# Patient Record
Sex: FEMALE | Race: WHITE | NOT HISPANIC OR LATINO | Employment: FULL TIME | URBAN - METROPOLITAN AREA
[De-identification: names, ages, dates, MRNs, and addresses within clinical notes are randomized per-mention and may not be internally consistent; named-entity substitution may affect disease eponyms.]

---

## 2021-01-22 ENCOUNTER — PATIENT MESSAGE (OUTPATIENT)
Dept: ADMINISTRATIVE | Facility: OTHER | Age: 61
End: 2021-01-22

## 2022-12-20 ENCOUNTER — OFFICE VISIT (OUTPATIENT)
Dept: PULMONOLOGY | Facility: CLINIC | Age: 62
End: 2022-12-20
Payer: COMMERCIAL

## 2022-12-20 VITALS
DIASTOLIC BLOOD PRESSURE: 75 MMHG | HEIGHT: 70 IN | OXYGEN SATURATION: 98 % | SYSTOLIC BLOOD PRESSURE: 120 MMHG | RESPIRATION RATE: 20 BRPM | BODY MASS INDEX: 24.02 KG/M2 | WEIGHT: 167.75 LBS | HEART RATE: 92 BPM

## 2022-12-20 DIAGNOSIS — D38.1 NEOPLASM OF UNCERTAIN BEHAVIOR OF LEFT UPPER LOBE OF LUNG: Primary | ICD-10-CM

## 2022-12-20 PROCEDURE — 3078F PR MOST RECENT DIASTOLIC BLOOD PRESSURE < 80 MM HG: ICD-10-PCS | Mod: CPTII,S$GLB,, | Performed by: INTERNAL MEDICINE

## 2022-12-20 PROCEDURE — 1159F PR MEDICATION LIST DOCUMENTED IN MEDICAL RECORD: ICD-10-PCS | Mod: CPTII,S$GLB,, | Performed by: INTERNAL MEDICINE

## 2022-12-20 PROCEDURE — 3008F BODY MASS INDEX DOCD: CPT | Mod: CPTII,S$GLB,, | Performed by: INTERNAL MEDICINE

## 2022-12-20 PROCEDURE — 1160F RVW MEDS BY RX/DR IN RCRD: CPT | Mod: CPTII,S$GLB,, | Performed by: INTERNAL MEDICINE

## 2022-12-20 PROCEDURE — 1159F MED LIST DOCD IN RCRD: CPT | Mod: CPTII,S$GLB,, | Performed by: INTERNAL MEDICINE

## 2022-12-20 PROCEDURE — 99999 PR PBB SHADOW E&M-NEW PATIENT-LVL III: CPT | Mod: PBBFAC,,, | Performed by: INTERNAL MEDICINE

## 2022-12-20 PROCEDURE — 99204 OFFICE O/P NEW MOD 45 MIN: CPT | Mod: S$GLB,,, | Performed by: INTERNAL MEDICINE

## 2022-12-20 PROCEDURE — 3074F SYST BP LT 130 MM HG: CPT | Mod: CPTII,S$GLB,, | Performed by: INTERNAL MEDICINE

## 2022-12-20 PROCEDURE — 1160F PR REVIEW ALL MEDS BY PRESCRIBER/CLIN PHARMACIST DOCUMENTED: ICD-10-PCS | Mod: CPTII,S$GLB,, | Performed by: INTERNAL MEDICINE

## 2022-12-20 PROCEDURE — 99999 PR PBB SHADOW E&M-NEW PATIENT-LVL III: ICD-10-PCS | Mod: PBBFAC,,, | Performed by: INTERNAL MEDICINE

## 2022-12-20 PROCEDURE — 3078F DIAST BP <80 MM HG: CPT | Mod: CPTII,S$GLB,, | Performed by: INTERNAL MEDICINE

## 2022-12-20 PROCEDURE — 3008F PR BODY MASS INDEX (BMI) DOCUMENTED: ICD-10-PCS | Mod: CPTII,S$GLB,, | Performed by: INTERNAL MEDICINE

## 2022-12-20 PROCEDURE — 3074F PR MOST RECENT SYSTOLIC BLOOD PRESSURE < 130 MM HG: ICD-10-PCS | Mod: CPTII,S$GLB,, | Performed by: INTERNAL MEDICINE

## 2022-12-20 PROCEDURE — 99204 PR OFFICE/OUTPT VISIT, NEW, LEVL IV, 45-59 MIN: ICD-10-PCS | Mod: S$GLB,,, | Performed by: INTERNAL MEDICINE

## 2022-12-20 RX ORDER — FLUTICASONE PROPIONATE AND SALMETEROL 100; 50 UG/1; UG/1
1 POWDER RESPIRATORY (INHALATION) 2 TIMES DAILY
COMMUNITY
Start: 2022-12-06 | End: 2022-12-20 | Stop reason: SDUPTHER

## 2022-12-20 RX ORDER — AZELASTINE 1 MG/ML
1 SPRAY, METERED NASAL 2 TIMES DAILY
COMMUNITY
Start: 2022-09-14

## 2022-12-20 RX ORDER — LEVOTHYROXINE SODIUM 88 UG/1
88 TABLET ORAL NIGHTLY
COMMUNITY
Start: 2022-11-08

## 2022-12-20 RX ORDER — FLUCONAZOLE 150 MG/1
150 TABLET ORAL ONCE
COMMUNITY
Start: 2022-12-14 | End: 2023-09-08

## 2022-12-20 RX ORDER — CETIRIZINE HYDROCHLORIDE 10 MG/1
5 TABLET ORAL DAILY
COMMUNITY

## 2022-12-20 RX ORDER — FLUTICASONE PROPIONATE 50 MCG
1 SPRAY, SUSPENSION (ML) NASAL 2 TIMES DAILY
COMMUNITY
Start: 2022-12-06

## 2022-12-20 RX ORDER — METHYLPHENIDATE HYDROCHLORIDE 18 MG/1
18 TABLET ORAL EVERY MORNING
COMMUNITY
Start: 2022-12-14 | End: 2024-02-28

## 2022-12-20 RX ORDER — FLUOXETINE HYDROCHLORIDE 20 MG/1
20 CAPSULE ORAL DAILY
COMMUNITY
Start: 2022-12-14

## 2022-12-20 RX ORDER — ESZOPICLONE 3 MG/1
3 TABLET, FILM COATED ORAL NIGHTLY
COMMUNITY
Start: 2022-12-05

## 2022-12-20 RX ORDER — FLUTICASONE PROPIONATE AND SALMETEROL 100; 50 UG/1; UG/1
1 POWDER RESPIRATORY (INHALATION)
COMMUNITY
Start: 2022-12-06 | End: 2023-09-08

## 2022-12-21 ENCOUNTER — PATIENT MESSAGE (OUTPATIENT)
Dept: PULMONOLOGY | Facility: CLINIC | Age: 62
End: 2022-12-21
Payer: COMMERCIAL

## 2022-12-21 NOTE — PROGRESS NOTES
Subjective:       Patient ID: Erma Hancock is a 62 y.o. female.    Chief Complaint: Abnormal Ct Scan    Abnormal Ct Scan    Patient is a 62-year-old female who recently saw her primary care physician with complaints of some left-sided pleuritic chest pain and cough.  This was assessed with a chest radiograph which subsequently led to a chest CT which showed a suspicious left upper lobe mass as well as multiple subcentimeter left-sided pulmonary nodules.  She was referred to me for that reason.  She is here today with her friend.  She states that she continues to have intermittent cough and left-sided pleuritic chest pain.  She was given albuterol inhaler which does help slightly.  No fevers, wheezing, hemoptysis or productive sputum.  No unintentional weight loss.  She does have a history of smoking but quit about 20-25 years ago.  Worked previously as an ER and hospice nurse.  No industrial or environmental exposure history.    Past Medical History:   Diagnosis Date    Thyroid disease      Past Surgical History:   Procedure Laterality Date    CHOLECYSTECTOMY      HYSTERECTOMY       Social History     Tobacco Use    Smoking status: Former     Types: Cigarettes     Quit date:      Years since quittin.9    Smokeless tobacco: Never   Substance Use Topics    Alcohol use: Yes     Alcohol/week: 2.0 standard drinks     Types: 2 Glasses of wine per week    Drug use: Not Currently     Family History   Problem Relation Age of Onset    Heart failure Mother     Diabetes Mother     Rheum arthritis Father     Diabetes Brother      Current Outpatient Medications   Medication Instructions    azelastine (ASTELIN) 137 mcg (0.1 %) nasal spray SMARTSIG:Both Nares    cetirizine (ZYRTEC) 5 mg, Oral    eszopiclone (LUNESTA) 3 mg, Oral, Nightly PRN    fluconazole (DIFLUCAN) 150 mg, Oral, Once    FLUoxetine 20 mg, Oral    fluticasone propionate (FLONASE) 50 mcg/actuation nasal spray 1 spray, Each Nostril, 2 times daily     fluticasone-salmeterol diskus inhaler 100-50 mcg 1 puff, Inhalation    levothyroxine (SYNTHROID) 88 mcg, Oral    methylphenidate HCl 18 mg, Oral, Every morning    TYRVAYA 0.03 mg/spray sprm 1 spray, Each Nostril, 2 times daily       Review of Systems  as per HPI otherwise negative    Objective:      Physical Exam   Constitutional: She is oriented to person, place, and time. She appears well-developed and well-nourished. No distress.   HENT:   Mouth/Throat: Oropharynx is clear and moist.   Neck: No JVD present.   Cardiovascular: Normal rate and regular rhythm.   No murmur heard.  Pulmonary/Chest: Normal expansion, effort normal and breath sounds normal. No respiratory distress.   Abdominal: Soft. She exhibits no distension. There is no abdominal tenderness.   Musculoskeletal:         General: No edema.      Cervical back: Neck supple.   Neurological: She is alert and oriented to person, place, and time.   Psychiatric: She has a normal mood and affect.   Nursing note and vitals reviewed.        Assessment:       1. 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     Answer:   Yes     I personally reviewed CT chest images.  My interpretation is as per history of present illness      Plan:       Dominant left upper lobe mass at about 2.6 x 2.4 cm.  It is located adjacent to the fissure.  Additionally there numerous other subcentimeter left-sided pulmonary nodules in the upper and lower lobes.  No distinct adenopathy noted per my interpretation.  No pleural process or pleural effusions noted.  Obviously worrisome for malignant process.  Alternatively carcinoid of the lung can certainly have this appearance.  If this is  malignant, a metastatic process would be favored.  My current plan is to obtain PET-CT at the earliest available and we will plan for navigational bronchoscopy for biopsy of the dominant mass in the left upper lobe.  I discussed the risks benefits and alternatives of the procedure with the patient she would an voiced understanding.  I will make plans based on the results of her PET and bronchoscopic biopsy.

## 2022-12-22 DIAGNOSIS — D38.1 NEOPLASM OF UNCERTAIN BEHAVIOR OF LUNG: Primary | ICD-10-CM

## 2022-12-23 ENCOUNTER — TELEPHONE (OUTPATIENT)
Dept: PULMONOLOGY | Facility: CLINIC | Age: 62
End: 2022-12-23
Payer: COMMERCIAL

## 2022-12-23 ENCOUNTER — PATIENT MESSAGE (OUTPATIENT)
Dept: ENDOSCOPY | Facility: HOSPITAL | Age: 62
End: 2022-12-23
Payer: COMMERCIAL

## 2022-12-26 ENCOUNTER — PATIENT MESSAGE (OUTPATIENT)
Dept: PULMONOLOGY | Facility: CLINIC | Age: 62
End: 2022-12-26
Payer: COMMERCIAL

## 2022-12-29 ENCOUNTER — PATIENT MESSAGE (OUTPATIENT)
Dept: PULMONOLOGY | Facility: CLINIC | Age: 62
End: 2022-12-29
Payer: COMMERCIAL

## 2022-12-30 DIAGNOSIS — D38.1 NEOPLASM OF UNCERTAIN BEHAVIOR OF LEFT LOWER LOBE OF LUNG: Primary | ICD-10-CM

## 2023-01-03 ENCOUNTER — HOSPITAL ENCOUNTER (OUTPATIENT)
Dept: RADIOLOGY | Facility: HOSPITAL | Age: 63
Discharge: HOME | End: 2023-01-03
Attending: INTERNAL MEDICINE
Payer: COMMERCIAL

## 2023-01-03 DIAGNOSIS — D38.1 NEOPLASM OF UNCERTAIN BEHAVIOR OF LEFT UPPER LOBE OF LUNG: ICD-10-CM

## 2023-01-03 PROCEDURE — A9552 F18 FDG: HCPCS

## 2023-01-03 PROCEDURE — 78815 NM PET CT ROUTINE: ICD-10-PCS | Mod: 26,PS,, | Performed by: RADIOLOGY

## 2023-01-03 PROCEDURE — 78815 PET IMAGE W/CT SKULL-THIGH: CPT | Mod: 26,PS,, | Performed by: RADIOLOGY

## 2023-01-05 ENCOUNTER — HOSPITAL ENCOUNTER (OUTPATIENT)
Facility: HOSPITAL | Age: 63
Discharge: HOME | End: 2023-01-05
Attending: INTERNAL MEDICINE | Admitting: INTERNAL MEDICINE
Payer: COMMERCIAL

## 2023-01-05 ENCOUNTER — ANESTHESIA (OUTPATIENT)
Dept: ENDOSCOPY | Facility: HOSPITAL | Age: 63
End: 2023-01-05
Payer: COMMERCIAL

## 2023-01-05 ENCOUNTER — ANESTHESIA EVENT (OUTPATIENT)
Dept: ENDOSCOPY | Facility: HOSPITAL | Age: 63
End: 2023-01-05
Payer: COMMERCIAL

## 2023-01-05 VITALS
HEIGHT: 70 IN | OXYGEN SATURATION: 94 % | SYSTOLIC BLOOD PRESSURE: 139 MMHG | RESPIRATION RATE: 15 BRPM | TEMPERATURE: 98 F | DIASTOLIC BLOOD PRESSURE: 90 MMHG | BODY MASS INDEX: 23.77 KG/M2 | HEART RATE: 88 BPM | WEIGHT: 166 LBS

## 2023-01-05 DIAGNOSIS — D38.1 NEOPLASM OF UNCERTAIN BEHAVIOR OF LUNG: Primary | ICD-10-CM

## 2023-01-05 PROCEDURE — 88173 CYTOPATH EVAL FNA REPORT: CPT | Mod: 26,,, | Performed by: STUDENT IN AN ORGANIZED HEALTH CARE EDUCATION/TRAINING PROGRAM

## 2023-01-05 PROCEDURE — 88173 CYTOPATH EVAL FNA REPORT: CPT | Performed by: STUDENT IN AN ORGANIZED HEALTH CARE EDUCATION/TRAINING PROGRAM

## 2023-01-05 PROCEDURE — 25000003 PHARM REV CODE 250: Performed by: NURSE ANESTHETIST, CERTIFIED REGISTERED

## 2023-01-05 PROCEDURE — 88305 TISSUE EXAM BY PATHOLOGIST: ICD-10-PCS | Mod: 26,,, | Performed by: STUDENT IN AN ORGANIZED HEALTH CARE EDUCATION/TRAINING PROGRAM

## 2023-01-05 PROCEDURE — 37000009 HC ANESTHESIA EA ADD 15 MINS: Performed by: INTERNAL MEDICINE

## 2023-01-05 PROCEDURE — 88173 PR  INTERPRETATION OF FNA SMEAR: ICD-10-PCS | Mod: 26,,, | Performed by: STUDENT IN AN ORGANIZED HEALTH CARE EDUCATION/TRAINING PROGRAM

## 2023-01-05 PROCEDURE — 27202791 HC VPAD 2 PT TRACKER: Performed by: INTERNAL MEDICINE

## 2023-01-05 PROCEDURE — 88341 IMHCHEM/IMCYTCHM EA ADD ANTB: CPT | Mod: 26,,, | Performed by: STUDENT IN AN ORGANIZED HEALTH CARE EDUCATION/TRAINING PROGRAM

## 2023-01-05 PROCEDURE — 88341 PR IHC OR ICC EACH ADD'L SINGLE ANTIBODY  STAINPR: ICD-10-PCS | Mod: 26,,, | Performed by: STUDENT IN AN ORGANIZED HEALTH CARE EDUCATION/TRAINING PROGRAM

## 2023-01-05 PROCEDURE — 88342 IMHCHEM/IMCYTCHM 1ST ANTB: CPT | Mod: 59 | Performed by: STUDENT IN AN ORGANIZED HEALTH CARE EDUCATION/TRAINING PROGRAM

## 2023-01-05 PROCEDURE — 88360 TUMOR IMMUNOHISTOCHEM/MANUAL: CPT | Performed by: STUDENT IN AN ORGANIZED HEALTH CARE EDUCATION/TRAINING PROGRAM

## 2023-01-05 PROCEDURE — 88342 IMHCHEM/IMCYTCHM 1ST ANTB: CPT | Mod: 26,,, | Performed by: STUDENT IN AN ORGANIZED HEALTH CARE EDUCATION/TRAINING PROGRAM

## 2023-01-05 PROCEDURE — 88305 TISSUE EXAM BY PATHOLOGIST: CPT | Mod: 26,,, | Performed by: STUDENT IN AN ORGANIZED HEALTH CARE EDUCATION/TRAINING PROGRAM

## 2023-01-05 PROCEDURE — 88342 CHG IMMUNOCYTOCHEMISTRY: ICD-10-PCS | Mod: 26,,, | Performed by: STUDENT IN AN ORGANIZED HEALTH CARE EDUCATION/TRAINING PROGRAM

## 2023-01-05 PROCEDURE — 88341 IMHCHEM/IMCYTCHM EA ADD ANTB: CPT | Mod: 59 | Performed by: STUDENT IN AN ORGANIZED HEALTH CARE EDUCATION/TRAINING PROGRAM

## 2023-01-05 PROCEDURE — 63600175 PHARM REV CODE 636 W HCPCS: Performed by: NURSE ANESTHETIST, CERTIFIED REGISTERED

## 2023-01-05 PROCEDURE — 27202059 HC NEEDLE, FNA (ANY): Performed by: INTERNAL MEDICINE

## 2023-01-05 PROCEDURE — 31652 BRONCH EBUS SAMPLNG 1/2 NODE: CPT | Performed by: INTERNAL MEDICINE

## 2023-01-05 PROCEDURE — 27202795 HC FORCEPS, ALWAYS ON TIP TRACKED: Performed by: INTERNAL MEDICINE

## 2023-01-05 PROCEDURE — 81445 SO NEO GSAP 5-50DNA/DNA&RNA: CPT | Performed by: STUDENT IN AN ORGANIZED HEALTH CARE EDUCATION/TRAINING PROGRAM

## 2023-01-05 PROCEDURE — 31627 NAVIGATIONAL BRONCHOSCOPY: CPT | Performed by: INTERNAL MEDICINE

## 2023-01-05 PROCEDURE — 37000008 HC ANESTHESIA 1ST 15 MINUTES: Performed by: INTERNAL MEDICINE

## 2023-01-05 PROCEDURE — 88381 MICRODISSECTION MANUAL: CPT | Performed by: STUDENT IN AN ORGANIZED HEALTH CARE EDUCATION/TRAINING PROGRAM

## 2023-01-05 PROCEDURE — 88305 TISSUE EXAM BY PATHOLOGIST: CPT | Performed by: STUDENT IN AN ORGANIZED HEALTH CARE EDUCATION/TRAINING PROGRAM

## 2023-01-05 RX ORDER — SODIUM CHLORIDE, SODIUM LACTATE, POTASSIUM CHLORIDE, CALCIUM CHLORIDE 600; 310; 30; 20 MG/100ML; MG/100ML; MG/100ML; MG/100ML
INJECTION, SOLUTION INTRAVENOUS CONTINUOUS PRN
Status: DISCONTINUED | OUTPATIENT
Start: 2023-01-05 | End: 2023-01-05

## 2023-01-05 RX ORDER — PROPOFOL 10 MG/ML
VIAL (ML) INTRAVENOUS
Status: DISCONTINUED | OUTPATIENT
Start: 2023-01-05 | End: 2023-01-05

## 2023-01-05 RX ORDER — LIDOCAINE HYDROCHLORIDE 10 MG/ML
INJECTION, SOLUTION EPIDURAL; INFILTRATION; INTRACAUDAL; PERINEURAL
Status: DISCONTINUED | OUTPATIENT
Start: 2023-01-05 | End: 2023-01-05

## 2023-01-05 RX ORDER — ONDANSETRON 2 MG/ML
INJECTION INTRAMUSCULAR; INTRAVENOUS
Status: DISCONTINUED | OUTPATIENT
Start: 2023-01-05 | End: 2023-01-05

## 2023-01-05 RX ORDER — ROCURONIUM BROMIDE 10 MG/ML
INJECTION, SOLUTION INTRAVENOUS
Status: DISCONTINUED | OUTPATIENT
Start: 2023-01-05 | End: 2023-01-05

## 2023-01-05 RX ADMIN — ROCURONIUM BROMIDE 50 MG: 10 INJECTION, SOLUTION INTRAVENOUS at 10:01

## 2023-01-05 RX ADMIN — SODIUM CHLORIDE, SODIUM LACTATE, POTASSIUM CHLORIDE, AND CALCIUM CHLORIDE: .6; .31; .03; .02 INJECTION, SOLUTION INTRAVENOUS at 10:01

## 2023-01-05 RX ADMIN — ONDANSETRON 4 MG: 2 INJECTION, SOLUTION INTRAMUSCULAR; INTRAVENOUS at 10:01

## 2023-01-05 RX ADMIN — LIDOCAINE HYDROCHLORIDE 100 MG: 10 INJECTION, SOLUTION EPIDURAL; INFILTRATION; INTRACAUDAL; PERINEURAL at 10:01

## 2023-01-05 RX ADMIN — PROPOFOL 150 MG: 10 INJECTION, EMULSION INTRAVENOUS at 10:01

## 2023-01-05 RX ADMIN — SUGAMMADEX 200 MG: 100 INJECTION, SOLUTION INTRAVENOUS at 10:01

## 2023-01-05 RX ADMIN — PROPOFOL 50 MG: 10 INJECTION, EMULSION INTRAVENOUS at 10:01

## 2023-01-05 NOTE — PLAN OF CARE
Dr browning came to bedside to discuss findings. Vital signs stable, no patient c/o nausea/vomitting, no pain, minimal blood-tinged sputum. Patient to be discharged from unit.

## 2023-01-05 NOTE — ANESTHESIA PREPROCEDURE EVALUATION
01/05/2023  Erma Hancock is a 62 y.o., female.    complaints of some left-sided pleuritic chest pain and cough.  This was assessed with a chest radiograph which subsequently led to a chest CT which showed a suspicious left upper lobe mass as well as multiple subcentimeter left-sided pulmonary nodules    Pre-op Assessment    I have reviewed the Patient Summary Reports.     I have reviewed the Nursing Notes. I have reviewed the NPO Status.      Review of Systems  Endocrine:   Hypothyroidism        Physical Exam  General: Well nourished, Cooperative and Alert    Airway:  Mallampati: II   Mouth Opening: Normal  TM Distance: Normal  Neck ROM: Normal ROM    Dental:  Intact        Anesthesia Plan  Type of Anesthesia, risks & benefits discussed:    Anesthesia Type: MAC, Gen Supraglottic Airway, Gen ETT  Intra-op Monitoring Plan: Standard ASA Monitors  Post Op Pain Control Plan: multimodal analgesia  Induction:  IV  Airway Plan: Direct  Informed Consent: Informed consent signed with the Patient and all parties understand the risks and agree with anesthesia plan.  All questions answered.   ASA Score: 2  Day of Surgery Review of History & Physical: I have interviewed and examined the patient. I have reviewed the patient's H&P dated:     Ready For Surgery From Anesthesia Perspective.     .

## 2023-01-05 NOTE — TRANSFER OF CARE
"Anesthesia Transfer of Care Note    Patient: Erma Hancock    Procedure(s) Performed: Procedure(s) (LRB):  BRONCHOSCOPY, NAVIGATIONAL (Left)  ENDOBRONCHIAL ULTRASOUND (EBUS) (Left)    Patient location: GI    Anesthesia Type: general    Transport from OR: Transported from OR on room air with adequate spontaneous ventilation    Post pain: adequate analgesia    Post assessment: no apparent anesthetic complications and tolerated procedure well    Post vital signs: stable    Level of consciousness: awake and alert    Nausea/Vomiting: no nausea/vomiting    Complications: none    Transfer of care protocol was followed      Last vitals:   Visit Vitals  /75   Pulse 79   Temp 36.6 °C (97.9 °F)   Resp 18   Ht 5' 10" (1.778 m)   Wt 75.3 kg (166 lb)   SpO2 97%   Breastfeeding No   BMI 23.82 kg/m²     "

## 2023-01-05 NOTE — OP NOTE
Bronchoscopy procedure     Pre-operative diagnosis: Left upper lobe lung mass  Post-operative diagnosis: Same s/p bronchsocopy with navigation biopsy (VERAN) and EBUS-TBNA of 11 L lymph node  Operating MD: Bam Catalan MD  Procedure performed: bronchsocopy with navigation biopsy (VERAN) and EBUS-TBNA of 11 L lymph node  Anesthesia:GETA  EBL: None  Specimens obtained: Left upper lobe mass x 7, EBUS-TBNA of 11L lymph node x 3  Complications: None    Monitoring for procedure:  See anesthesia record    Procedure in detail:  ASA: 3  Mallimpati score: 3    Findings:  Vocal cords: not examined  Trachea: normal  Left/Right main bronchi:  normal  RUL: normal  RML: normal  RLL: normal  JAZMYNE: endobronchial mass visible, biopsy x 7 with VERAN forceps  Lingula:  normal  LLL: normal    Ultrasound findings:  4R: no LN identified  4L:6mm LN  7:  8mm LN  10/11R: no LN identified  10/11L: 1.2 cm 11L LN, biopsy x 3    After obtaining CT of the chest with Veran navigation pads in place, the workstation was used to map the appropriate airway course to the target lesion.  The patient was sedated and intubated by anesthesia.  The ultrathin bronchoscope was then introduced via the bronchoscope port and advanced down the endotracheal tube.  Airway inspection was carried out with findings as indicated above.  The bronchoscope and tracked forceps were then advanced to the level of the target lesion.  It was visible partly within the airway.  Using the forceps specimens were obtained from this mass in place in formalin.  Blood loss was trivial.  At that point the ultrasound bronchoscope was then activated and ultrasound examination of the mediastinal and hilar structures was undertaken with findings as indicated above.  The 11 L lymph node was biopsied x3 with 21 gauge needle.  Specimens were placed on glass slides and in CytoLyt for cytopathologic examination.  After specimens were obtained and airway was examined again for hemostasis the  scope was withdrawn intact.  At that point the patient was awoken and brought to recovery in stable condition.  Follow up plans will be based on the results of today's biopsy.

## 2023-01-05 NOTE — DISCHARGE SUMMARY
[facility])  Discharge Note  Short Stay    Procedure(s) (LRB):  BRONCHOSCOPY, NAVIGATIONAL (Left)  ENDOBRONCHIAL ULTRASOUND (EBUS) (Left)      OUTCOME: Patient tolerated treatment/procedure well without complication and is now ready for discharge.    DISPOSITION: Home or Self Care    FINAL DIAGNOSIS:  Neoplasm of the left upper lobe    FOLLOWUP: In clinic    DISCHARGE INSTRUCTIONS:  No discharge procedures on file.      Clinical Reference Documents Added to Patient Instructions         Document    BRONCHOSCOPY, DIAGNOSTIC (ENGLISH)            TIME SPENT ON DISCHARGE: 25 minutes

## 2023-01-05 NOTE — ANESTHESIA PROCEDURE NOTES
Intubation    Date/Time: 1/5/2023 10:10 AM  Performed by: Jacek Puente CRNA  Authorized by: Tobin Gimenez MD     Intubation:     Induction:  Intravenous    Intubated:  Postinduction    Mask Ventilation:  Easy mask    Attempts:  1    Attempted By:  CRNA    Method of Intubation:  Direct    Blade:  Ramon 2    Laryngeal View Grade: Grade I - full view of cords      Difficult Airway Encountered?: No      Complications:  None    Airway Device:  Oral endotracheal tube    Airway Device Size:  8.5    Style/Cuff Inflation:  Cuffed    Tube secured:  21    Secured at:  The lips    Placement Verified By:  Capnometry    Complicating Factors:  None    Findings Post-Intubation:  BS equal bilateral

## 2023-01-05 NOTE — ANESTHESIA POSTPROCEDURE EVALUATION
Anesthesia Post Evaluation    Patient: Erma Hancock    Procedure(s) Performed: Procedure(s) (LRB):  BRONCHOSCOPY, NAVIGATIONAL (Left)  ENDOBRONCHIAL ULTRASOUND (EBUS) (Left)    Final Anesthesia Type: general      Patient location during evaluation: GI PACU  Patient participation: Yes- Able to Participate  Level of consciousness: awake and alert and awake  Post-procedure vital signs: reviewed and stable  Pain management: adequate  Airway patency: patent  BRENDA mitigation strategies: Multimodal analgesia  PONV status at discharge: No PONV  Anesthetic complications: no      Cardiovascular status: blood pressure returned to baseline and hemodynamically stable  Respiratory status: unassisted and spontaneous ventilation  Hydration status: euvolemic  Follow-up not needed.          Vitals Value Taken Time   BP  01/05/23 1054   Temp  01/05/23 1054   Pulse  01/05/23 1054   Resp  01/05/23 1054   SpO2  01/05/23 1054         No case tracking events are documented in the log.      Pain/Matthew Score: No data recorded

## 2023-01-13 DIAGNOSIS — J41.1 CHRONIC BRONCHITIS, MUCOPURULENT: Primary | ICD-10-CM

## 2023-01-13 DIAGNOSIS — C34.12 MALIGNANT NEOPLASM OF UPPER LOBE OF LEFT LUNG: ICD-10-CM

## 2023-01-13 RX ORDER — ALBUTEROL SULFATE 90 UG/1
2 AEROSOL, METERED RESPIRATORY (INHALATION) EVERY 6 HOURS PRN
Qty: 18 G | Refills: 0 | Status: SHIPPED | OUTPATIENT
Start: 2023-01-13 | End: 2024-03-05 | Stop reason: SDUPTHER

## 2023-01-13 NOTE — PROGRESS NOTES
Called to discuss with patient. She has a referral already placed to Dr. Fields at [facility]. She requested consultation with Dr. Melendez at [facility]. I have placed referral.

## 2023-01-31 LAB
COMMENT: NORMAL
FINAL PATHOLOGIC DIAGNOSIS: NORMAL
Lab: NORMAL
SUPPLEMENTAL DIAGNOSIS: NORMAL

## 2023-09-08 ENCOUNTER — LAB VISIT (OUTPATIENT)
Dept: LAB | Facility: HOSPITAL | Age: 63
End: 2023-09-08
Attending: INTERNAL MEDICINE
Payer: COMMERCIAL

## 2023-09-08 ENCOUNTER — OFFICE VISIT (OUTPATIENT)
Dept: HEMATOLOGY/ONCOLOGY | Facility: CLINIC | Age: 63
End: 2023-09-08
Payer: COMMERCIAL

## 2023-09-08 VITALS
HEART RATE: 113 BPM | WEIGHT: 180.44 LBS | DIASTOLIC BLOOD PRESSURE: 89 MMHG | TEMPERATURE: 96 F | SYSTOLIC BLOOD PRESSURE: 158 MMHG | BODY MASS INDEX: 25.89 KG/M2 | OXYGEN SATURATION: 96 %

## 2023-09-08 DIAGNOSIS — C78.2 SECONDARY MALIGNANCY OF PARIETAL PLEURA: ICD-10-CM

## 2023-09-08 DIAGNOSIS — R60.0 BILATERAL EDEMA OF LOWER EXTREMITY: ICD-10-CM

## 2023-09-08 DIAGNOSIS — D38.1 NEOPLASM OF UNCERTAIN BEHAVIOR OF LUNG: Primary | ICD-10-CM

## 2023-09-08 DIAGNOSIS — E03.9 HYPOTHYROIDISM, UNSPECIFIED TYPE: ICD-10-CM

## 2023-09-08 DIAGNOSIS — D38.1 NEOPLASM OF UNCERTAIN BEHAVIOR OF LUNG: ICD-10-CM

## 2023-09-08 DIAGNOSIS — E11.9 CONTROLLED TYPE 2 DIABETES MELLITUS WITHOUT COMPLICATION, WITHOUT LONG-TERM CURRENT USE OF INSULIN: ICD-10-CM

## 2023-09-08 DIAGNOSIS — C79.51 SECONDARY MALIGNANT NEOPLASM OF BONE: ICD-10-CM

## 2023-09-08 DIAGNOSIS — C34.12 MALIGNANT NEOPLASM OF UPPER LOBE OF LEFT LUNG: ICD-10-CM

## 2023-09-08 PROBLEM — E78.00 HYPERCHOLESTEREMIA: Status: ACTIVE | Noted: 2019-02-05

## 2023-09-08 PROCEDURE — 1160F PR REVIEW ALL MEDS BY PRESCRIBER/CLIN PHARMACIST DOCUMENTED: ICD-10-PCS | Mod: CPTII,S$GLB,, | Performed by: INTERNAL MEDICINE

## 2023-09-08 PROCEDURE — 1160F RVW MEDS BY RX/DR IN RCRD: CPT | Mod: CPTII,S$GLB,, | Performed by: INTERNAL MEDICINE

## 2023-09-08 PROCEDURE — 36415 COLL VENOUS BLD VENIPUNCTURE: CPT | Performed by: INTERNAL MEDICINE

## 2023-09-08 PROCEDURE — 1159F PR MEDICATION LIST DOCUMENTED IN MEDICAL RECORD: ICD-10-PCS | Mod: CPTII,S$GLB,, | Performed by: INTERNAL MEDICINE

## 2023-09-08 PROCEDURE — 99999 PR PBB SHADOW E&M-EST. PATIENT-LVL IV: CPT | Mod: PBBFAC,,, | Performed by: INTERNAL MEDICINE

## 2023-09-08 PROCEDURE — 1159F MED LIST DOCD IN RCRD: CPT | Mod: CPTII,S$GLB,, | Performed by: INTERNAL MEDICINE

## 2023-09-08 PROCEDURE — 3008F BODY MASS INDEX DOCD: CPT | Mod: CPTII,S$GLB,, | Performed by: INTERNAL MEDICINE

## 2023-09-08 PROCEDURE — 99205 OFFICE O/P NEW HI 60 MIN: CPT | Mod: S$GLB,,, | Performed by: INTERNAL MEDICINE

## 2023-09-08 PROCEDURE — 99999 PR PBB SHADOW E&M-EST. PATIENT-LVL IV: ICD-10-PCS | Mod: PBBFAC,,, | Performed by: INTERNAL MEDICINE

## 2023-09-08 PROCEDURE — 3077F SYST BP >= 140 MM HG: CPT | Mod: CPTII,S$GLB,, | Performed by: INTERNAL MEDICINE

## 2023-09-08 PROCEDURE — 99205 PR OFFICE/OUTPT VISIT, NEW, LEVL V, 60-74 MIN: ICD-10-PCS | Mod: S$GLB,,, | Performed by: INTERNAL MEDICINE

## 2023-09-08 PROCEDURE — 3008F PR BODY MASS INDEX (BMI) DOCUMENTED: ICD-10-PCS | Mod: CPTII,S$GLB,, | Performed by: INTERNAL MEDICINE

## 2023-09-08 PROCEDURE — 3077F PR MOST RECENT SYSTOLIC BLOOD PRESSURE >= 140 MM HG: ICD-10-PCS | Mod: CPTII,S$GLB,, | Performed by: INTERNAL MEDICINE

## 2023-09-08 PROCEDURE — 3079F DIAST BP 80-89 MM HG: CPT | Mod: CPTII,S$GLB,, | Performed by: INTERNAL MEDICINE

## 2023-09-08 PROCEDURE — 3079F PR MOST RECENT DIASTOLIC BLOOD PRESSURE 80-89 MM HG: ICD-10-PCS | Mod: CPTII,S$GLB,, | Performed by: INTERNAL MEDICINE

## 2023-09-08 RX ORDER — ONDANSETRON 4 MG/1
2 TABLET, ORALLY DISINTEGRATING ORAL EVERY 6 HOURS PRN
COMMUNITY
Start: 2023-05-31

## 2023-09-08 RX ORDER — PROMETHAZINE HYDROCHLORIDE 25 MG/1
25 TABLET ORAL EVERY 6 HOURS PRN
COMMUNITY
Start: 2023-02-22

## 2023-09-08 RX ORDER — HYDROXYZINE HYDROCHLORIDE 25 MG/1
25 TABLET, FILM COATED ORAL DAILY
COMMUNITY
Start: 2023-09-07

## 2023-09-08 RX ORDER — GABAPENTIN 100 MG/1
CAPSULE ORAL
COMMUNITY
Start: 2023-04-17 | End: 2023-10-30

## 2023-09-08 RX ORDER — IBUPROFEN 200 MG
TABLET ORAL EVERY 6 HOURS PRN
COMMUNITY

## 2023-09-08 RX ORDER — IPRATROPIUM BROMIDE 21 UG/1
SPRAY, METERED NASAL
COMMUNITY
Start: 2023-08-28

## 2023-09-08 RX ORDER — MUPIROCIN 20 MG/G
OINTMENT TOPICAL 3 TIMES DAILY
COMMUNITY
Start: 2023-06-20

## 2023-09-08 RX ORDER — FOLIC ACID 1 MG/1
1 TABLET ORAL EVERY MORNING
COMMUNITY
Start: 2023-02-22 | End: 2024-02-28

## 2023-09-08 RX ORDER — PANTOPRAZOLE SODIUM 40 MG/1
1 TABLET, DELAYED RELEASE ORAL EVERY MORNING
COMMUNITY
Start: 2023-06-19 | End: 2024-06-18

## 2023-09-08 RX ORDER — OXYCODONE AND ACETAMINOPHEN 5; 325 MG/1; MG/1
1 TABLET ORAL 2 TIMES DAILY PRN
COMMUNITY
Start: 2023-04-20 | End: 2024-02-28

## 2023-09-08 RX ORDER — OLANZAPINE 5 MG/1
5 TABLET ORAL NIGHTLY
COMMUNITY
Start: 2023-05-01

## 2023-09-08 RX ORDER — HYDROCORTISONE 25 MG/ML
LOTION TOPICAL
COMMUNITY
Start: 2023-07-10 | End: 2024-02-28

## 2023-09-08 RX ORDER — PREDNISONE 10 MG/1
10 TABLET ORAL DAILY
Qty: 30 TABLET | Refills: 2 | Status: ON HOLD | OUTPATIENT
Start: 2023-09-08 | End: 2024-03-01 | Stop reason: HOSPADM

## 2023-09-08 NOTE — PROGRESS NOTES
Subjective:       Patient ID: Erma Hancock is a 62 y.o. female.    Chief Complaint: Results, Chemotherapy, and Lung Cancer    HPI:  62-year-old female diagnosed with stage IV non-small cell lung carcinoma in 2023.  Patient has surgical exploration found to have pleural disease.  Patient has been started on initially carboplatin, trimetrexate, and Keytruda has had 4 cycles of carboplatin in his currently on maintenance Keytruda in primary tracts 8.  The patient presents for 2nd opinion for therapeutic considerations.  ECOG status 1 I have reviewed extensively a note from most recently seen by Dr. Hossein Fields diagnosis and treatment plant[facility] the at [facility] through Care everywhere detail in her medical    Past Medical History:   Diagnosis Date    Malignant neoplasm of upper lobe of left lung 2023    Thyroid disease      Family History   Problem Relation Age of Onset    Heart failure Mother     Diabetes Mother     Rheum arthritis Father     Diabetes Brother      Social History     Socioeconomic History    Marital status:    Tobacco Use    Smoking status: Former     Current packs/day: 0.00     Types: Cigarettes     Quit date:      Years since quittin.7    Smokeless tobacco: Never   Substance and Sexual Activity    Alcohol use: Yes     Alcohol/week: 2.0 standard drinks of alcohol     Types: 2 Glasses of wine per week     Comment: socially    Drug use: Not Currently    Sexual activity: Not Currently     Past Surgical History:   Procedure Laterality Date    CHOLECYSTECTOMY      ENDOBRONCHIAL ULTRASOUND Left 2023    Procedure: ENDOBRONCHIAL ULTRASOUND (EBUS);  Surgeon: Bam Catalan MD;  Location: [facility];  Service: Pulmonary;  Laterality: Left;    HYSTERECTOMY      NAVIGATIONAL BRONCHOSCOPY Left 2023    Procedure: BRONCHOSCOPY, NAVIGATIONAL;  Surgeon: Bam Catlaan MD;  Location: [facility];  Service: Pulmonary;  Laterality: Left;       Labs:  No  "results found for: "WBC", "HGB", "HCT", "MCV", "PLT"  BMP  No results found for: "NA", "K", "CL", "CO2", "BUN", "CREATININE", "CALCIUM", "ANIONGAP", "ESTGFRAFRICA", "EGFRNONAA"  No results found for: "ALT", "AST", "GGT", "ALKPHOS", "BILITOT"    No results found for: "IRON", "TIBC", "FERRITIN", "SATURATEDIRO"  No results found for: "RUQNDMLL30"  No results found for: "FOLATE"  Lab Results   Component Value Date    TSH 1.005 07/31/2023         Review of Systems   Constitutional:  Positive for activity change and fatigue. Negative for appetite change, chills, diaphoresis, fever and unexpected weight change.   HENT:  Negative for congestion, dental problem, drooling, ear discharge, ear pain, facial swelling, hearing loss, mouth sores, nosebleeds, postnasal drip, rhinorrhea, sinus pressure, sneezing, sore throat, tinnitus, trouble swallowing and voice change.    Eyes:  Negative for photophobia, pain, discharge, redness, itching and visual disturbance.   Respiratory:  Negative for cough, choking, chest tightness, shortness of breath, wheezing and stridor.    Cardiovascular:  Negative for chest pain, palpitations and leg swelling.   Gastrointestinal:  Negative for abdominal distention, abdominal pain, anal bleeding, blood in stool, constipation, diarrhea, nausea, rectal pain and vomiting.   Endocrine: Negative for cold intolerance, heat intolerance, polydipsia, polyphagia and polyuria.   Genitourinary:  Negative for decreased urine volume, difficulty urinating, dyspareunia, dysuria, enuresis, flank pain, frequency, genital sores, hematuria, menstrual problem, pelvic pain, urgency, vaginal bleeding, vaginal discharge and vaginal pain.   Musculoskeletal:  Positive for gait problem. Negative for arthralgias, back pain, joint swelling, myalgias, neck pain and neck stiffness.   Skin:  Positive for rash. Negative for color change and pallor.   Allergic/Immunologic: Negative for environmental allergies, food allergies and " immunocompromised state.   Neurological:  Positive for weakness. Negative for dizziness, tremors, seizures, syncope, facial asymmetry, speech difficulty, light-headedness, numbness and headaches.   Hematological:  Negative for adenopathy. Does not bruise/bleed easily.   Psychiatric/Behavioral:  Positive for dysphoric mood. Negative for agitation, behavioral problems, confusion, decreased concentration, hallucinations, self-injury, sleep disturbance and suicidal ideas. The patient is nervous/anxious. The patient is not hyperactive.        Objective:      Physical Exam  Vitals reviewed.   Constitutional:       General: She is not in acute distress.     Appearance: She is well-developed. She is not diaphoretic.   HENT:      Head: Normocephalic and atraumatic.      Right Ear: External ear normal.      Left Ear: External ear normal.      Nose: Nose normal.      Right Sinus: No maxillary sinus tenderness or frontal sinus tenderness.      Left Sinus: No maxillary sinus tenderness or frontal sinus tenderness.      Mouth/Throat:      Pharynx: No oropharyngeal exudate.   Eyes:      General: Lids are normal. No scleral icterus.        Right eye: No discharge.         Left eye: No discharge.      Conjunctiva/sclera: Conjunctivae normal.      Right eye: Right conjunctiva is not injected. No hemorrhage.     Left eye: Left conjunctiva is not injected. No hemorrhage.     Pupils: Pupils are equal, round, and reactive to light.   Neck:      Thyroid: No thyromegaly.      Vascular: No JVD.      Trachea: No tracheal deviation.   Cardiovascular:      Rate and Rhythm: Normal rate.   Pulmonary:      Effort: Pulmonary effort is normal. No respiratory distress.      Breath sounds: No stridor.   Chest:      Chest wall: No tenderness.   Abdominal:      General: Bowel sounds are normal. There is no distension.      Palpations: Abdomen is soft. There is no hepatomegaly, splenomegaly or mass.      Tenderness: There is no abdominal tenderness.  There is no rebound.   Musculoskeletal:         General: No tenderness. Normal range of motion.      Cervical back: Normal range of motion and neck supple.   Lymphadenopathy:      Cervical: No cervical adenopathy.      Upper Body:      Right upper body: No supraclavicular adenopathy.      Left upper body: No supraclavicular adenopathy.   Skin:     General: Skin is dry.      Findings: Erythema and rash present.   Neurological:      Mental Status: She is alert and oriented to person, place, and time.      Cranial Nerves: No cranial nerve deficit.      Coordination: Coordination normal.   Psychiatric:         Behavior: Behavior normal.         Thought Content: Thought content normal.         Judgment: Judgment normal.             Assessment:      1. Neoplasm of uncertain behavior of lung    2. Secondary malignancy of parietal pleura    3. Secondary malignant neoplasm of bone    4. Bilateral edema of lower extremity    5. Hypothyroidism, unspecified type    6. Controlled type 2 diabetes mellitus without complication, without long-term current use of insulin    7. Malignant neoplasm of upper lobe of left lung           Med Onc Chart Routing      Follow up with physician . Nurse navigation to obtain outside pathology report and to see whether not next generation sequencing completed   Follow up with [facility]    Infusion scheduling note    Injection scheduling note    Labs    Imaging    Pharmacy appointment    Other referrals               Plan:     Extensive conversation with she and family and friend present.  Reviewed toxicities what appeared to be confined mainly to her left lower leg with skin changes.  At this point whether not this is related to immunotherapy is difficult to ascertain.  This seems to be her biggest issue I would suggest trying low-dose prednisone no more than 10 mg daily.  Understanding that higher doses may Megace the affects of immunotherapy and.  In terms of reviewing her records I do not see a copy  of the path report that I can obtain to see whether not checked next generation sequencing was done on the initial material we try to obtain this if not I will recommend that this be done.  I have taken the liberty order [facility]us liquid biopsy today results pending proximally 3 weeks.  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  I have suggested that she proceed with asking her primary oncologist have Radiology review all previous imaging to see whether not there had been previously diagnosis of metastatic disease to bone.  At this point from a prognostic standpoint I do not think this will make a difference.  But understandably so she is somewhat perplexed concerned about this.  Certainly supportive agents for prevention of bone metastasis may be considered in this particular setting.  I have told him to continue with current plan course of action her next restaging has approximately 6 weeks she can try the low-dose prednisone next generation sequencing on peripheral blood is drawn will try to obtain the results we back with touch with her once this been accomplished and to review likelihood of altering her current course of action is low at 10% articles from up-to-date have been sent to her detailed initial treatment for relapsed non-small cell lung carcinoma she would ask for this article.  In addition on article immuno therapy toxicity have consent as well as personalize genomic typing of non-small cell lung carcinoma.  We have talked about the fact that she does have stage IV disease this is a treatable but not curable malignancy whether not she would be a candidate for disability leave that that would be a  consideration especially with some of the toxicities that she is observe certainly if she can contact her primary oncologist  to see whether not        Zenon Cheney Jr, MD FACP

## 2023-09-11 ENCOUNTER — PATIENT MESSAGE (OUTPATIENT)
Dept: HEMATOLOGY/ONCOLOGY | Facility: CLINIC | Age: 63
End: 2023-09-11
Payer: COMMERCIAL

## 2023-09-15 DIAGNOSIS — D38.1 NEOPLASM OF UNCERTAIN BEHAVIOR OF LUNG: Primary | ICD-10-CM

## 2023-09-18 LAB
DNA RANGE(S) EXAMINED NAR: NORMAL
GENE DIS ANL INTERP-IMP: POSITIVE
GENE DIS ASSESSED: NORMAL
MSI CA SPEC-IMP: NOT DETECTED
REASON FOR STUDY: NORMAL
TEMPUS LCA: NORMAL
TEMPUS PORTAL: NORMAL

## 2023-09-25 LAB
DNA RANGE(S) EXAMINED NAR: NORMAL
GENE DIS ANL INTERP-IMP: POSITIVE
GENE DIS ASSESSED: NORMAL
GENE MUT TESTED BLD/T: 3.7 M/MB
MSI CA SPEC-IMP: NORMAL
REASON FOR STUDY: NORMAL
TEMPUS FUSIONADDENDUM: NORMAL
TEMPUS LCA: NORMAL
TEMPUS PERTINENTNEGATIVES: NORMAL
TEMPUS PORTAL: NORMAL
TEMPUS THERAPY1: NORMAL
TEMPUS THERAPY2: NORMAL
TEMPUS THERAPY3: NORMAL
TEMPUS THERAPY4: NORMAL
TEMPUS THERAPY5: NORMAL
TEMPUS THERAPY6: NORMAL
TEMPUS THERAPYCOUNT: 6
TEMPUS TRIAL1: NORMAL
TEMPUS TRIAL2: NORMAL
TEMPUS TRIAL3: NORMAL
TEMPUS TRIALCOUNT: 3

## 2023-09-27 ENCOUNTER — TELEPHONE (OUTPATIENT)
Dept: HEMATOLOGY/ONCOLOGY | Facility: CLINIC | Age: 63
End: 2023-09-27
Payer: COMMERCIAL

## 2023-09-27 NOTE — NURSING
1433pm: Outgoing call to pt regarding Dr. Cheney in-basket msg request to see pt for a visit to review NGS test results. Spoke with pt. Explained this to pt. Pt scheduled on 10/5 at 1 pm at  for Dr. Cheney. Pt given location directions. Pt verbalized understanding. Pt plan to report to appt as scheduled.

## 2023-10-05 ENCOUNTER — OFFICE VISIT (OUTPATIENT)
Dept: HEMATOLOGY/ONCOLOGY | Facility: CLINIC | Age: 63
End: 2023-10-05
Payer: COMMERCIAL

## 2023-10-05 VITALS
BODY MASS INDEX: 25.61 KG/M2 | HEIGHT: 70 IN | TEMPERATURE: 99 F | DIASTOLIC BLOOD PRESSURE: 92 MMHG | WEIGHT: 178.88 LBS | OXYGEN SATURATION: 97 % | HEART RATE: 98 BPM | SYSTOLIC BLOOD PRESSURE: 162 MMHG

## 2023-10-05 DIAGNOSIS — D38.1 NEOPLASM OF UNCERTAIN BEHAVIOR OF LUNG: ICD-10-CM

## 2023-10-05 DIAGNOSIS — C34.12 MALIGNANT NEOPLASM OF UPPER LOBE OF LEFT LUNG: Primary | ICD-10-CM

## 2023-10-05 DIAGNOSIS — C78.2 SECONDARY MALIGNANCY OF PARIETAL PLEURA: ICD-10-CM

## 2023-10-05 DIAGNOSIS — C34.90 EGFR-RELATED LUNG CANCER: ICD-10-CM

## 2023-10-05 DIAGNOSIS — C79.51 SECONDARY MALIGNANT NEOPLASM OF BONE: ICD-10-CM

## 2023-10-05 PROCEDURE — 1160F RVW MEDS BY RX/DR IN RCRD: CPT | Mod: CPTII,S$GLB,, | Performed by: INTERNAL MEDICINE

## 2023-10-05 PROCEDURE — 3080F PR MOST RECENT DIASTOLIC BLOOD PRESSURE >= 90 MM HG: ICD-10-PCS | Mod: CPTII,S$GLB,, | Performed by: INTERNAL MEDICINE

## 2023-10-05 PROCEDURE — 1159F PR MEDICATION LIST DOCUMENTED IN MEDICAL RECORD: ICD-10-PCS | Mod: CPTII,S$GLB,, | Performed by: INTERNAL MEDICINE

## 2023-10-05 PROCEDURE — 3077F SYST BP >= 140 MM HG: CPT | Mod: CPTII,S$GLB,, | Performed by: INTERNAL MEDICINE

## 2023-10-05 PROCEDURE — 3008F BODY MASS INDEX DOCD: CPT | Mod: CPTII,S$GLB,, | Performed by: INTERNAL MEDICINE

## 2023-10-05 PROCEDURE — 3008F PR BODY MASS INDEX (BMI) DOCUMENTED: ICD-10-PCS | Mod: CPTII,S$GLB,, | Performed by: INTERNAL MEDICINE

## 2023-10-05 PROCEDURE — 3044F PR MOST RECENT HEMOGLOBIN A1C LEVEL <7.0%: ICD-10-PCS | Mod: CPTII,S$GLB,, | Performed by: INTERNAL MEDICINE

## 2023-10-05 PROCEDURE — 99215 OFFICE O/P EST HI 40 MIN: CPT | Mod: S$GLB,,, | Performed by: INTERNAL MEDICINE

## 2023-10-05 PROCEDURE — 1159F MED LIST DOCD IN RCRD: CPT | Mod: CPTII,S$GLB,, | Performed by: INTERNAL MEDICINE

## 2023-10-05 PROCEDURE — 1160F PR REVIEW ALL MEDS BY PRESCRIBER/CLIN PHARMACIST DOCUMENTED: ICD-10-PCS | Mod: CPTII,S$GLB,, | Performed by: INTERNAL MEDICINE

## 2023-10-05 PROCEDURE — 3077F PR MOST RECENT SYSTOLIC BLOOD PRESSURE >= 140 MM HG: ICD-10-PCS | Mod: CPTII,S$GLB,, | Performed by: INTERNAL MEDICINE

## 2023-10-05 PROCEDURE — 99215 PR OFFICE/OUTPT VISIT, EST, LEVL V, 40-54 MIN: ICD-10-PCS | Mod: S$GLB,,, | Performed by: INTERNAL MEDICINE

## 2023-10-05 PROCEDURE — 99999 PR PBB SHADOW E&M-EST. PATIENT-LVL V: CPT | Mod: PBBFAC,,, | Performed by: INTERNAL MEDICINE

## 2023-10-05 PROCEDURE — 3080F DIAST BP >= 90 MM HG: CPT | Mod: CPTII,S$GLB,, | Performed by: INTERNAL MEDICINE

## 2023-10-05 PROCEDURE — 99999 PR PBB SHADOW E&M-EST. PATIENT-LVL V: ICD-10-PCS | Mod: PBBFAC,,, | Performed by: INTERNAL MEDICINE

## 2023-10-05 PROCEDURE — 3044F HG A1C LEVEL LT 7.0%: CPT | Mod: CPTII,S$GLB,, | Performed by: INTERNAL MEDICINE

## 2023-10-05 NOTE — PROGRESS NOTES
"Subjective:       Patient ID: Erma Hancock is a 62 y.o. female.    Chief Complaint: Results, Chemotherapy, and Lung Cancer    HPI:  62-year-old female returns results of liquid biopsy demonstrate p.L858R  Missense variant (exon 21) - GOF patient is scheduled to be seen by her primary oncologist at [facility], [place].  A copy of her report has been given to her to bring to him and I will make a phone call to inform him of this I was unable to see whether not next generation sequencing was done of her pleura biopsy she did have a cytological diagnosis here in [facility] but there was too little material to proceed with next generation sequencing on that             Past Medical History:   Diagnosis Date    Malignant neoplasm of upper lobe of left lung 2023    Thyroid disease      Family History   Problem Relation Age of Onset    Heart failure Mother     Diabetes Mother     Rheum arthritis Father     Diabetes Brother      Social History     Socioeconomic History    Marital status:    Tobacco Use    Smoking status: Former     Current packs/day: 0.00     Types: Cigarettes     Quit date:      Years since quittin.    Smokeless tobacco: Never   Substance and Sexual Activity    Alcohol use: Yes     Alcohol/week: 2.0 standard drinks of alcohol     Types: 2 Glasses of wine per week     Comment: socially    Drug use: Not Currently    Sexual activity: Not Currently     Past Surgical History:   Procedure Laterality Date    CHOLECYSTECTOMY      ENDOBRONCHIAL ULTRASOUND Left 2023    Procedure: ENDOBRONCHIAL ULTRASOUND (EBUS);  Surgeon: Bam Catalan MD;  Location: [facility];  Service: Pulmonary;  Laterality: Left;    HYSTERECTOMY      NAVIGATIONAL BRONCHOSCOPY Left 2023    Procedure: BRONCHOSCOPY, NAVIGATIONAL;  Surgeon: Bam Catalan MD;  Location: [facility];  Service: Pulmonary;  Laterality: Left;       Labs:  No results found for: "WBC", "HGB", "HCT", "MCV", "PLT"  BMP  No " "results found for: "NA", "K", "CL", "CO2", "BUN", "CREATININE", "CALCIUM", "ANIONGAP", "ESTGFRAFRICA", "EGFRNONAA"  No results found for: "ALT", "AST", "GGT", "ALKPHOS", "BILITOT"    No results found for: "IRON", "TIBC", "FERRITIN", "SATURATEDIRO"  No results found for: "[id number]"  No results found for: "FOLATE"  Lab Results   Component Value Date    TSH 2.460 09/18/2023         Review of Systems   Constitutional:  Negative for activity change, appetite change, chills, diaphoresis, fatigue, fever and unexpected weight change.   HENT:  Negative for congestion, dental problem, drooling, ear discharge, ear pain, facial swelling, hearing loss, mouth sores, nosebleeds, postnasal drip, rhinorrhea, sinus pressure, sneezing, sore throat, tinnitus, trouble swallowing and voice change.    Eyes:  Negative for photophobia, pain, discharge, redness, itching and visual disturbance.   Respiratory:  Negative for cough, choking, chest tightness, shortness of breath, wheezing and stridor.    Cardiovascular:  Negative for chest pain, palpitations and leg swelling.   Gastrointestinal:  Negative for abdominal distention, abdominal pain, anal bleeding, blood in stool, constipation, diarrhea, nausea, rectal pain and vomiting.   Endocrine: Negative for cold intolerance, heat intolerance, polydipsia, polyphagia and polyuria.   Genitourinary:  Negative for decreased urine volume, difficulty urinating, dyspareunia, dysuria, enuresis, flank pain, frequency, genital sores, hematuria, menstrual problem, pelvic pain, urgency, vaginal bleeding, vaginal discharge and vaginal pain.   Musculoskeletal:  Negative for arthralgias, back pain, gait problem, joint swelling, myalgias, neck pain and neck stiffness.   Skin:  Negative for color change, pallor and rash.   Allergic/Immunologic: Negative for environmental allergies, food allergies and immunocompromised state.   Neurological:  Negative for dizziness, tremors, seizures, syncope, facial asymmetry, " speech difficulty, weakness, light-headedness, numbness and headaches.   Hematological:  Negative for adenopathy. Does not bruise/bleed easily.   Psychiatric/Behavioral:  Positive for dysphoric mood. Negative for agitation, behavioral problems, confusion, decreased concentration, hallucinations, self-injury, sleep disturbance and suicidal ideas. The patient is nervous/anxious. The patient is not hyperactive.        Objective:      Physical Exam  Vitals reviewed.   Constitutional:       General: She is not in acute distress.     Appearance: She is well-developed. She is not diaphoretic.   HENT:      Head: Normocephalic and atraumatic.      Right Ear: External ear normal.      Left Ear: External ear normal.      Nose: Nose normal.      Right Sinus: No maxillary sinus tenderness or frontal sinus tenderness.      Left Sinus: No maxillary sinus tenderness or frontal sinus tenderness.      Mouth/Throat:      Pharynx: No oropharyngeal exudate.   Eyes:      General: Lids are normal. No scleral icterus.        Right eye: No discharge.         Left eye: No discharge.      Conjunctiva/sclera: Conjunctivae normal.      Right eye: Right conjunctiva is not injected. No hemorrhage.     Left eye: Left conjunctiva is not injected. No hemorrhage.     Pupils: Pupils are equal, round, and reactive to light.   Neck:      Thyroid: No thyromegaly.      Vascular: No JVD.      Trachea: No tracheal deviation.   Cardiovascular:      Rate and Rhythm: Normal rate.   Pulmonary:      Effort: Pulmonary effort is normal. No respiratory distress.      Breath sounds: No stridor.   Chest:      Chest wall: No tenderness.   Abdominal:      General: Bowel sounds are normal. There is no distension.      Palpations: Abdomen is soft. There is no hepatomegaly, splenomegaly or mass.      Tenderness: There is no abdominal tenderness. There is no rebound.   Musculoskeletal:         General: No tenderness. Normal range of motion.      Cervical back: Normal range  of motion and neck supple.   Lymphadenopathy:      Cervical: No cervical adenopathy.      Upper Body:      Right upper body: No supraclavicular adenopathy.      Left upper body: No supraclavicular adenopathy.   Skin:     General: Skin is dry.      Findings: No erythema or rash.   Neurological:      Mental Status: She is alert and oriented to person, place, and time.      Cranial Nerves: No cranial nerve deficit.      Coordination: Coordination normal.   Psychiatric:         Behavior: Behavior normal.         Thought Content: Thought content normal.         Judgment: Judgment normal.             Assessment:      1. Malignant neoplasm of upper lobe of left lung    2. EGFR-related lung cancer    3. Neoplasm of uncertain behavior of lung    4. Secondary malignancy of parietal pleura    5. Secondary malignant neoplasm of bone           Med Onc Chart Routing      Follow up with physician . Patient will discuss with primary oncologist   Follow up with [facility]    Infusion scheduling note    Injection scheduling note    Labs    Imaging    Pharmacy appointment    Other referrals                   Plan:     Extensive conversation with patient a copy of her report has been given to her she has an EGD RF mutation Exon 21. p.L858R  Missense variant (exon 21) - GOF which may be amenable to targeted therapy.  Copy of this was given to her article from up-to-date followed to her for her review and will return to her primary oncologist for review all make a phone call to him to inform them of this.        Zenon Cheney Jr, MD FACP

## 2023-10-06 ENCOUNTER — PATIENT MESSAGE (OUTPATIENT)
Dept: HEMATOLOGY/ONCOLOGY | Facility: CLINIC | Age: 63
End: 2023-10-06
Payer: COMMERCIAL

## 2023-10-06 DIAGNOSIS — C34.90 EGFR-RELATED LUNG CANCER: Primary | ICD-10-CM

## 2023-10-06 DIAGNOSIS — C34.90 MALIGNANT NEOPLASM OF UNSPECIFIED PART OF UNSPECIFIED BRONCHUS OR LUNG: ICD-10-CM

## 2023-10-06 NOTE — PLAN OF CARE
START ON PATHWAY REGIMEN - Non-Small Cell Lung    [id number]        Osimertinib (Tagrisso)     **Always confirm dose/schedule in your pharmacy ordering system**    Patient Characteristics:  Stage IV Metastatic, Nonsquamous, Molecular Analysis Completed, Molecular   Alteration Present and Eligible for Molecular Targeted Therapy, Initial   Molecular Targeted Therapy, EGFR Mutation - Common (Exon 19 Deletion or Exon 21   L858R Substitution)  Therapeutic Status: Stage IV Metastatic  Histology: Nonsquamous Cell  Broad Molecular Profiling Status: Molecular Analysis Completed  Molecular Analysis Results: Alteration Present and Eligible for Molecular   Targeted Therapy  Molecular Alteration Present: EGFR Mutation - Common (Exon 19 Deletion or Exon   21 L858R Substitution)  Molecular Targeted Line of Therapy: Initial Molecular Targeted Therapy  Intent of Therapy:  Non-Curative / Palliative Intent, Discussed with Patient

## 2023-10-09 ENCOUNTER — TELEPHONE (OUTPATIENT)
Dept: HEMATOLOGY/ONCOLOGY | Facility: CLINIC | Age: 63
End: 2023-10-09
Payer: COMMERCIAL

## 2023-10-09 DIAGNOSIS — C34.90 MALIGNANT NEOPLASM OF UNSPECIFIED PART OF UNSPECIFIED BRONCHUS OR LUNG: Primary | ICD-10-CM

## 2023-10-09 NOTE — NURSING
1509pm: Outgoing call to pt regarding [facility] process for Tagrisso and upcoming appts. Spoke with pt. Pt instructed once receive Tagrisso in mail to contact me directly via phone to discuss start date. Pt given my direct contact info. Pt scheduled for labs on 10/12 at 1 pm at , for PET scan on 10/12 at 130 pm at , for fup w/Dr. Cheney on 10/13 at 1 pm at [facility], and for EKG on 10/13 at 130 pm at [facility]. Pt given location directions. Pt verbalized understanding. Pt plan to report to appt as scheduled.   Oncology Navigation   Intake  Date of Diagnosis: 02/01/23 (at [facility])  Cancer Type: Other  Internal / External Referral: External (self-referral for 2nd opinion)  Date of Referral: 08/30/23  Initial Nurse Navigator Contact: 09/27/23  Referral to Initial Contact Timeline (days): 28  Date Worked: 10/09/23  Appointment Date: 09/08/23  Schedule to Appointment Timeline (days): -31  Multiple appointments: No     Treatment  Current Status: Active       Medical Oncologist: Dr. Zenon Cheney  Oral Therapy: Planned (Osimertinib (Tagrisso))       Procedures: PET scan  PET Scan Schedule Date: 10/12/23                 Acuity      Follow Up  No follow-ups on file.

## 2023-10-12 ENCOUNTER — HOSPITAL ENCOUNTER (OUTPATIENT)
Dept: RADIOLOGY | Facility: HOSPITAL | Age: 63
Discharge: HOME | End: 2023-10-12
Attending: INTERNAL MEDICINE
Payer: COMMERCIAL

## 2023-10-12 DIAGNOSIS — C34.90 MALIGNANT NEOPLASM OF UNSPECIFIED PART OF UNSPECIFIED BRONCHUS OR LUNG: ICD-10-CM

## 2023-10-12 DIAGNOSIS — C34.90 EGFR-RELATED LUNG CANCER: ICD-10-CM

## 2023-10-12 PROCEDURE — A9552 F18 FDG: HCPCS

## 2023-10-12 PROCEDURE — 78815 NM PET CT ROUTINE: ICD-10-PCS | Mod: 26,PS,, | Performed by: RADIOLOGY

## 2023-10-12 PROCEDURE — 78815 PET IMAGE W/CT SKULL-THIGH: CPT | Mod: 26,PS,, | Performed by: RADIOLOGY

## 2023-10-13 ENCOUNTER — OFFICE VISIT (OUTPATIENT)
Dept: HEMATOLOGY/ONCOLOGY | Facility: CLINIC | Age: 63
End: 2023-10-13
Payer: COMMERCIAL

## 2023-10-13 ENCOUNTER — HOSPITAL ENCOUNTER (OUTPATIENT)
Dept: CARDIOLOGY | Facility: HOSPITAL | Age: 63
Discharge: HOME | End: 2023-10-13
Attending: INTERNAL MEDICINE
Payer: COMMERCIAL

## 2023-10-13 VITALS
OXYGEN SATURATION: 97 % | WEIGHT: 179 LBS | HEIGHT: 70 IN | TEMPERATURE: 98 F | HEART RATE: 90 BPM | RESPIRATION RATE: 20 BRPM | BODY MASS INDEX: 25.62 KG/M2 | DIASTOLIC BLOOD PRESSURE: 92 MMHG | SYSTOLIC BLOOD PRESSURE: 157 MMHG

## 2023-10-13 DIAGNOSIS — Z79.899 IMMUNODEFICIENCY DUE TO CHEMOTHERAPY: ICD-10-CM

## 2023-10-13 DIAGNOSIS — C79.51 SECONDARY MALIGNANT NEOPLASM OF BONE: ICD-10-CM

## 2023-10-13 DIAGNOSIS — G43.E09 CHRONIC MIGRAINE WITH AURA WITHOUT STATUS MIGRAINOSUS, NOT INTRACTABLE: ICD-10-CM

## 2023-10-13 DIAGNOSIS — C78.2 SECONDARY MALIGNANCY OF PARIETAL PLEURA: ICD-10-CM

## 2023-10-13 DIAGNOSIS — T45.1X5A IMMUNODEFICIENCY DUE TO CHEMOTHERAPY: ICD-10-CM

## 2023-10-13 DIAGNOSIS — C34.90 MALIGNANT NEOPLASM OF UNSPECIFIED PART OF UNSPECIFIED BRONCHUS OR LUNG: Primary | ICD-10-CM

## 2023-10-13 DIAGNOSIS — C34.12 MALIGNANT NEOPLASM OF UPPER LOBE OF LEFT LUNG: ICD-10-CM

## 2023-10-13 DIAGNOSIS — C34.90 MALIGNANT NEOPLASM OF UNSPECIFIED PART OF UNSPECIFIED BRONCHUS OR LUNG: ICD-10-CM

## 2023-10-13 DIAGNOSIS — C34.90 EGFR-RELATED LUNG CANCER: ICD-10-CM

## 2023-10-13 DIAGNOSIS — D84.821 IMMUNODEFICIENCY DUE TO CHEMOTHERAPY: ICD-10-CM

## 2023-10-13 PROBLEM — Z79.69 IMMUNODEFICIENCY DUE TO CHEMOTHERAPY: Status: ACTIVE | Noted: 2023-10-13

## 2023-10-13 PROBLEM — D38.1 NEOPLASM OF UNCERTAIN BEHAVIOR OF LUNG: Status: RESOLVED | Noted: 2023-01-05 | Resolved: 2023-10-13

## 2023-10-13 PROCEDURE — 3008F BODY MASS INDEX DOCD: CPT | Mod: CPTII,S$GLB,, | Performed by: INTERNAL MEDICINE

## 2023-10-13 PROCEDURE — 3008F PR BODY MASS INDEX (BMI) DOCUMENTED: ICD-10-PCS | Mod: CPTII,S$GLB,, | Performed by: INTERNAL MEDICINE

## 2023-10-13 PROCEDURE — 1160F PR REVIEW ALL MEDS BY PRESCRIBER/CLIN PHARMACIST DOCUMENTED: ICD-10-PCS | Mod: CPTII,S$GLB,, | Performed by: INTERNAL MEDICINE

## 2023-10-13 PROCEDURE — 93010 EKG 12-LEAD: ICD-10-PCS | Mod: ,,, | Performed by: STUDENT IN AN ORGANIZED HEALTH CARE EDUCATION/TRAINING PROGRAM

## 2023-10-13 PROCEDURE — 3080F PR MOST RECENT DIASTOLIC BLOOD PRESSURE >= 90 MM HG: ICD-10-PCS | Mod: CPTII,S$GLB,, | Performed by: INTERNAL MEDICINE

## 2023-10-13 PROCEDURE — 1159F MED LIST DOCD IN RCRD: CPT | Mod: CPTII,S$GLB,, | Performed by: INTERNAL MEDICINE

## 2023-10-13 PROCEDURE — 99999 PR PBB SHADOW E&M-EST. PATIENT-LVL V: CPT | Mod: PBBFAC,,, | Performed by: INTERNAL MEDICINE

## 2023-10-13 PROCEDURE — 3044F PR MOST RECENT HEMOGLOBIN A1C LEVEL <7.0%: ICD-10-PCS | Mod: CPTII,S$GLB,, | Performed by: INTERNAL MEDICINE

## 2023-10-13 PROCEDURE — 3080F DIAST BP >= 90 MM HG: CPT | Mod: CPTII,S$GLB,, | Performed by: INTERNAL MEDICINE

## 2023-10-13 PROCEDURE — 93010 ELECTROCARDIOGRAM REPORT: CPT | Mod: ,,, | Performed by: STUDENT IN AN ORGANIZED HEALTH CARE EDUCATION/TRAINING PROGRAM

## 2023-10-13 PROCEDURE — 3077F SYST BP >= 140 MM HG: CPT | Mod: CPTII,S$GLB,, | Performed by: INTERNAL MEDICINE

## 2023-10-13 PROCEDURE — 1160F RVW MEDS BY RX/DR IN RCRD: CPT | Mod: CPTII,S$GLB,, | Performed by: INTERNAL MEDICINE

## 2023-10-13 PROCEDURE — 3077F PR MOST RECENT SYSTOLIC BLOOD PRESSURE >= 140 MM HG: ICD-10-PCS | Mod: CPTII,S$GLB,, | Performed by: INTERNAL MEDICINE

## 2023-10-13 PROCEDURE — 99215 PR OFFICE/OUTPT VISIT, EST, LEVL V, 40-54 MIN: ICD-10-PCS | Mod: S$GLB,,, | Performed by: INTERNAL MEDICINE

## 2023-10-13 PROCEDURE — 3044F HG A1C LEVEL LT 7.0%: CPT | Mod: CPTII,S$GLB,, | Performed by: INTERNAL MEDICINE

## 2023-10-13 PROCEDURE — 1159F PR MEDICATION LIST DOCUMENTED IN MEDICAL RECORD: ICD-10-PCS | Mod: CPTII,S$GLB,, | Performed by: INTERNAL MEDICINE

## 2023-10-13 PROCEDURE — 93005 ELECTROCARDIOGRAM TRACING: CPT

## 2023-10-13 PROCEDURE — 99215 OFFICE O/P EST HI 40 MIN: CPT | Mod: S$GLB,,, | Performed by: INTERNAL MEDICINE

## 2023-10-13 PROCEDURE — 99999 PR PBB SHADOW E&M-EST. PATIENT-LVL V: ICD-10-PCS | Mod: PBBFAC,,, | Performed by: INTERNAL MEDICINE

## 2023-10-13 RX ORDER — SODIUM CHLORIDE 0.9 % (FLUSH) 0.9 %
10 SYRINGE (ML) INJECTION
OUTPATIENT
Start: 2024-02-28

## 2023-10-13 RX ORDER — HEPARIN 100 UNIT/ML
500 SYRINGE INTRAVENOUS
OUTPATIENT
Start: 2024-02-28

## 2023-10-13 NOTE — PROGRESS NOTES
Subjective:       Patient ID: Erma Rojas is a 62 y.o. female.    Chief Complaint: Results, Chemotherapy, and Lung Cancer    HPI:  62-year-old female history EGD RF positive non-small cell lung carcinoma patient to begin to  OSIMERTINIB  treatment.  Patient recently discovered on next generation sequencing to have Exon 21 mutation.  Patient has completed chemotherapy with new baseline PET scan performed today increasing headache last performed in 2023    Past Medical History:   Diagnosis Date    Malignant neoplasm of upper lobe of left lung 2023    Thyroid disease      Family History   Problem Relation Age of Onset    Heart failure Mother     Diabetes Mother     Rheum arthritis Father     Diabetes Brother      Social History     Socioeconomic History    Marital status:    Tobacco Use    Smoking status: Former     Current packs/day: 0.00     Types: Cigarettes     Quit date:      Years since quittin.    Smokeless tobacco: Never   Substance and Sexual Activity    Alcohol use: Yes     Alcohol/week: 2.0 standard drinks of alcohol     Types: 2 Glasses of wine per week     Comment: socially    Drug use: Not Currently    Sexual activity: Not Currently   Social History Narrative    ** Merged History Encounter **          Past Surgical History:   Procedure Laterality Date    CHOLECYSTECTOMY      ENDOBRONCHIAL ULTRASOUND Left 2023    Procedure: ENDOBRONCHIAL ULTRASOUND (EBUS);  Surgeon: Bam Catalan MD;  Location: [facility];  Service: Pulmonary;  Laterality: Left;    HYSTERECTOMY      NAVIGATIONAL BRONCHOSCOPY Left 2023    Procedure: BRONCHOSCOPY, NAVIGATIONAL;  Surgeon: Bam Catalan MD;  Location: [facility];  Service: Pulmonary;  Laterality: Left;       Labs:  Lab Results   Component Value Date    WBC 7.45 10/12/2023    HGB 10.4 (L) 10/12/2023    HCT 32.5 (L) 10/12/2023    MCV 93 10/12/2023     (H) 10/12/2023     BMP  Lab Results   Component Value Date    NA  "141 10/12/2023    K 4.6 10/12/2023     10/12/2023    CO2 23 10/12/2023    BUN 18 10/12/2023    CREATININE 1.3 10/12/2023    CALCIUM 9.1 10/12/2023    ANIONGAP 12 10/12/2023     Lab Results   Component Value Date    ALT 52 (H) 10/12/2023    AST 71 (H) 10/12/2023    ALKPHOS 116 10/12/2023    BILITOT 0.2 10/12/2023       No results found for: "IRON", "TIBC", "FERRITIN", "SATURATEDIRO"  No results found for: "[id number]"  No results found for: "FOLATE"  Lab Results   Component Value Date    TSH 2.460 09/18/2023         Review of Systems   Constitutional:  Negative for activity change, appetite change, chills, diaphoresis, fatigue, fever and unexpected weight change.   HENT:  Negative for congestion, dental problem, drooling, ear discharge, ear pain, facial swelling, hearing loss, mouth sores, nosebleeds, postnasal drip, rhinorrhea, sinus pressure, sneezing, sore throat, tinnitus, trouble swallowing and voice change.    Eyes:  Negative for photophobia, pain, discharge, redness, itching and visual disturbance.   Respiratory:  Negative for cough, choking, chest tightness, shortness of breath, wheezing and stridor.    Cardiovascular:  Negative for chest pain, palpitations and leg swelling.   Gastrointestinal:  Negative for abdominal distention, abdominal pain, anal bleeding, blood in stool, constipation, diarrhea, nausea, rectal pain and vomiting.   Endocrine: Negative for cold intolerance, heat intolerance, polydipsia, polyphagia and polyuria.   Genitourinary:  Negative for decreased urine volume, difficulty urinating, dyspareunia, dysuria, enuresis, flank pain, frequency, genital sores, hematuria, menstrual problem, pelvic pain, urgency, vaginal bleeding, vaginal discharge and vaginal pain.   Musculoskeletal:  Negative for arthralgias, back pain, gait problem, joint swelling, myalgias, neck pain and neck stiffness.   Skin:  Negative for color change, pallor and rash.   Allergic/Immunologic: Negative for " environmental allergies, food allergies and immunocompromised state.   Neurological:  Positive for headaches. Negative for dizziness, tremors, seizures, syncope, facial asymmetry, speech difficulty, weakness, light-headedness and numbness.        Patient has had migraine headaches in past   Hematological:  Negative for adenopathy. Does not bruise/bleed easily.   Psychiatric/Behavioral:  Negative for agitation, behavioral problems, confusion, decreased concentration, dysphoric mood, hallucinations, self-injury, sleep disturbance and suicidal ideas. The patient is not nervous/anxious and is not hyperactive.        Objective:      Physical Exam  Vitals reviewed.   Constitutional:       General: She is not in acute distress.     Appearance: She is well-developed. She is not diaphoretic.   HENT:      Head: Normocephalic and atraumatic.      Right Ear: External ear normal.      Left Ear: External ear normal.      Nose: Nose normal.      Right Sinus: No maxillary sinus tenderness or frontal sinus tenderness.      Left Sinus: No maxillary sinus tenderness or frontal sinus tenderness.      Mouth/Throat:      Pharynx: No oropharyngeal exudate.   Eyes:      General: Lids are normal. No scleral icterus.        Right eye: No discharge.         Left eye: No discharge.      Conjunctiva/sclera: Conjunctivae normal.      Right eye: Right conjunctiva is not injected. No hemorrhage.     Left eye: Left conjunctiva is not injected. No hemorrhage.     Pupils: Pupils are equal, round, and reactive to light.   Neck:      Thyroid: No thyromegaly.      Vascular: No JVD.      Trachea: No tracheal deviation.   Cardiovascular:      Rate and Rhythm: Normal rate.   Pulmonary:      Effort: Pulmonary effort is normal. No respiratory distress.      Breath sounds: No stridor.   Chest:      Chest wall: No tenderness.   Abdominal:      General: Bowel sounds are normal. There is no distension.      Palpations: Abdomen is soft. There is no hepatomegaly,  splenomegaly or mass.      Tenderness: There is no abdominal tenderness. There is no rebound.   Musculoskeletal:         General: No tenderness. Normal range of motion.      Cervical back: Normal range of motion and neck supple.   Lymphadenopathy:      Cervical: No cervical adenopathy.      Upper Body:      Right upper body: No supraclavicular adenopathy.      Left upper body: No supraclavicular adenopathy.   Skin:     General: Skin is dry.      Findings: No erythema or rash.   Neurological:      Mental Status: She is alert and oriented to person, place, and time.      Cranial Nerves: No cranial nerve deficit.      Coordination: Coordination normal.   Psychiatric:         Behavior: Behavior normal.         Thought Content: Thought content normal.         Judgment: Judgment normal.             Assessment:      1. Malignant neoplasm of unspecified part of unspecified bronchus or lung    2. Chronic migraine with aura without status migrainosus, not intractable    3. EGFR-related lung cancer    4. Malignant neoplasm of upper lobe of left lung    5. Secondary malignancy of parietal pleura    6. Secondary malignant neoplasm of bone    7. Immunodeficiency due to chemotherapy           Med Onc Chart Routing      Follow up with physician . Return to clinic to see me in 2-3 weeks with CBC CMP and EKG   Follow up with [facility]    Infusion scheduling note    Injection scheduling note    Labs    Imaging MRI   Needs MRI brain now   Pharmacy appointment    Other referrals         Referral to Neurology after MRI brain completed              Plan:     Extensive conversation with her recommend initiation ofOSIMERTINIB   80 mg daily baseline EKG today return 2-3 weeks with repeat EKG to check QT interval.  Continue with standing labs CBC CMP.  Referral made to palliative care for advanced care planningAdvance Care Planning Consented the patient to the treatment plan and the patient was educated on the planned duration of the treatment and  schedule of the treatment administration.  Discussed in reviewed information with patient.  At this time answered questions reviewed images personally patient is scheduled see dentist and then ultimately will discuss use of Zometa on three-month basis to prevent further bone metastasis.  Decrease risk of bone fracture orders written reviewed copy of information sheet on to aggressive given to patient    Zenon Cheney Jr, MD FACP

## 2023-10-16 NOTE — PROGRESS NOTES
Pharmacist Renal Dose Adjustment Note    JACOBO COLEMAN is a 62 y.o. female being treated with the medication zoledronic acid    Patient Data:    Vital Signs (Most Recent):    Vital Signs (72h Range):        Recent Labs   Lab 10/12/23  1449   CREATININE 1.3     Serum creatinine: 1.3 mg/dL 10/12/23 1449  Estimated creatinine clearance: 47.9 mL/min    Medication:Zoledronic acid dose: 4mg frequency Q4W will be changed to medication:Zoledronic acid dose:3.3mg frequency:q4w    Pharmacist's Name: Charlotte Pastor McArdle  Pharmacist's Extension: [phone]

## 2023-10-18 ENCOUNTER — HOSPITAL ENCOUNTER (OUTPATIENT)
Dept: RADIOLOGY | Facility: HOSPITAL | Age: 63
Discharge: HOME | End: 2023-10-18
Attending: INTERNAL MEDICINE
Payer: COMMERCIAL

## 2023-10-18 DIAGNOSIS — C34.90 MALIGNANT NEOPLASM OF UNSPECIFIED PART OF UNSPECIFIED BRONCHUS OR LUNG: ICD-10-CM

## 2023-10-18 PROCEDURE — 70553 MRI BRAIN STEM W/O & W/DYE: CPT | Mod: 26,,, | Performed by: RADIOLOGY

## 2023-10-18 PROCEDURE — A9585 GADOBUTROL INJECTION: HCPCS | Mod: PO | Performed by: INTERNAL MEDICINE

## 2023-10-18 PROCEDURE — 25500020 PHARM REV CODE 255: Mod: PO | Performed by: INTERNAL MEDICINE

## 2023-10-18 PROCEDURE — 70553 MRI BRAIN STEM W/O & W/DYE: CPT | Mod: TC,PO

## 2023-10-18 PROCEDURE — 70553 MRI BRAIN W WO CONTRAST: ICD-10-PCS | Mod: 26,,, | Performed by: RADIOLOGY

## 2023-10-18 RX ORDER — GADOBUTROL 604.72 MG/ML
8 INJECTION INTRAVENOUS
Status: COMPLETED | OUTPATIENT
Start: 2023-10-18 | End: 2023-10-18

## 2023-10-18 RX ADMIN — GADOBUTROL 8 ML: 604.72 INJECTION INTRAVENOUS at 11:10

## 2023-10-25 ENCOUNTER — PATIENT MESSAGE (OUTPATIENT)
Dept: HEMATOLOGY/ONCOLOGY | Facility: CLINIC | Age: 63
End: 2023-10-25
Payer: COMMERCIAL

## 2023-10-27 ENCOUNTER — TELEPHONE (OUTPATIENT)
Dept: HEMATOLOGY/ONCOLOGY | Facility: CLINIC | Age: 63
End: 2023-10-27
Payer: COMMERCIAL

## 2023-10-27 ENCOUNTER — HOSPITAL ENCOUNTER (OUTPATIENT)
Dept: CARDIOLOGY | Facility: HOSPITAL | Age: 63
Discharge: HOME | End: 2023-10-27
Attending: INTERNAL MEDICINE
Payer: COMMERCIAL

## 2023-10-27 DIAGNOSIS — C34.90 EGFR-RELATED LUNG CANCER: ICD-10-CM

## 2023-10-27 PROCEDURE — 93010 EKG 12-LEAD: ICD-10-PCS | Mod: ,,, | Performed by: INTERNAL MEDICINE

## 2023-10-27 PROCEDURE — 93005 ELECTROCARDIOGRAM TRACING: CPT

## 2023-10-27 PROCEDURE — 93010 ELECTROCARDIOGRAM REPORT: CPT | Mod: ,,, | Performed by: INTERNAL MEDICINE

## 2023-10-27 NOTE — NURSING
1134am: Outgoing call to pt regarding oral Tagrisso. Spoke with pt. Inquired if pt had received medication from [facility] bc I'd not received any communication from pt regarding this. Pt reported she received medication on 10/17 and started medication on 10/23. Pt has fup w/Dr. Cheney on 10/30 already scheduled. Pt instructed going forward to let me know directly if pt develops new symptoms regarding medication. Pt given my direct contact info again. Although this is oral this medication is still chemo therefore s/s need to be documented and reported to assist pt accordingly. Pt verbalized understanding.   Oncology Navigation   Intake  Date of Diagnosis: 02/01/23 (at [facility])  Cancer Type: Other  Internal / External Referral: External (self-referral for 2nd opinion)  Date of Referral: 08/30/23  Initial Nurse Navigator Contact: 09/27/23  Referral to Initial Contact Timeline (days): 28  Date Worked: 10/27/23  Appointment Date: 09/08/23  Schedule to Appointment Timeline (days): -31  Multiple appointments: No  Start of Treatment: 10/23/23 (started oral Tagrisso)     Treatment  Current Status: Active       Medical Oncologist: Dr. Zenon Cheney  Oral Therapy: Planned (Osimertinib (Tagrisso))       Procedures: PET scan  PET Scan Schedule Date: 10/12/23                 Acuity      Follow Up  No follow-ups on file.

## 2023-10-30 ENCOUNTER — DOCUMENTATION ONLY (OUTPATIENT)
Dept: HEMATOLOGY/ONCOLOGY | Facility: CLINIC | Age: 63
End: 2023-10-30

## 2023-10-30 ENCOUNTER — OFFICE VISIT (OUTPATIENT)
Dept: HEMATOLOGY/ONCOLOGY | Facility: CLINIC | Age: 63
End: 2023-10-30
Payer: COMMERCIAL

## 2023-10-30 ENCOUNTER — LAB VISIT (OUTPATIENT)
Dept: LAB | Facility: HOSPITAL | Age: 63
End: 2023-10-30
Attending: INTERNAL MEDICINE
Payer: COMMERCIAL

## 2023-10-30 VITALS
SYSTOLIC BLOOD PRESSURE: 143 MMHG | HEIGHT: 70 IN | BODY MASS INDEX: 25.6 KG/M2 | WEIGHT: 178.81 LBS | HEART RATE: 83 BPM | OXYGEN SATURATION: 96 % | DIASTOLIC BLOOD PRESSURE: 75 MMHG | TEMPERATURE: 98 F

## 2023-10-30 DIAGNOSIS — T45.1X5A IMMUNODEFICIENCY DUE TO CHEMOTHERAPY: ICD-10-CM

## 2023-10-30 DIAGNOSIS — Z79.899 IMMUNODEFICIENCY DUE TO CHEMOTHERAPY: ICD-10-CM

## 2023-10-30 DIAGNOSIS — C34.90 EGFR-RELATED LUNG CANCER: Primary | ICD-10-CM

## 2023-10-30 DIAGNOSIS — D84.821 IMMUNODEFICIENCY DUE TO CHEMOTHERAPY: ICD-10-CM

## 2023-10-30 DIAGNOSIS — C78.2 SECONDARY MALIGNANCY OF PARIETAL PLEURA: ICD-10-CM

## 2023-10-30 DIAGNOSIS — C79.51 SECONDARY MALIGNANT NEOPLASM OF BONE: ICD-10-CM

## 2023-10-30 DIAGNOSIS — C34.12 MALIGNANT NEOPLASM OF UPPER LOBE OF LEFT LUNG: ICD-10-CM

## 2023-10-30 DIAGNOSIS — C34.90 MALIGNANT NEOPLASM OF UNSPECIFIED PART OF UNSPECIFIED BRONCHUS OR LUNG: ICD-10-CM

## 2023-10-30 LAB
ALBUMIN SERPL BCP-MCNC: 3.8 G/DL (ref 3.5–5.2)
ALP SERPL-CCNC: 120 U/L (ref 55–135)
ALT SERPL W/O P-5'-P-CCNC: 47 U/L (ref 10–44)
ANION GAP SERPL CALC-SCNC: 11 MMOL/L (ref 8–16)
AST SERPL-CCNC: 57 U/L (ref 10–40)
BASOPHILS # BLD AUTO: 0.09 K/UL (ref 0–0.2)
BASOPHILS NFR BLD: 1.3 % (ref 0–1.9)
BILIRUB SERPL-MCNC: 0.2 MG/DL (ref 0.1–1)
BUN SERPL-MCNC: 19 MG/DL (ref 8–23)
CALCIUM SERPL-MCNC: 9.3 MG/DL (ref 8.7–10.5)
CHLORIDE SERPL-SCNC: 107 MMOL/L (ref 95–110)
CO2 SERPL-SCNC: 24 MMOL/L (ref 23–29)
CREAT SERPL-MCNC: 1.6 MG/DL (ref 0.5–1.4)
DIFFERENTIAL METHOD: ABNORMAL
EOSINOPHIL # BLD AUTO: 0.7 K/UL (ref 0–0.5)
EOSINOPHIL NFR BLD: 10.6 % (ref 0–8)
ERYTHROCYTE [DISTWIDTH] IN BLOOD BY AUTOMATED COUNT: 14.9 % (ref 11.5–14.5)
EST. GFR  (NO RACE VARIABLE): 36 ML/MIN/1.73 M^2
GLUCOSE SERPL-MCNC: 103 MG/DL (ref 70–110)
HCT VFR BLD AUTO: 33 % (ref 37–48.5)
HGB BLD-MCNC: 10.5 G/DL (ref 12–16)
IMM GRANULOCYTES # BLD AUTO: 0.02 K/UL (ref 0–0.04)
IMM GRANULOCYTES NFR BLD AUTO: 0.3 % (ref 0–0.5)
LDH SERPL L TO P-CCNC: 311 U/L (ref 110–260)
LYMPHOCYTES # BLD AUTO: 1.3 K/UL (ref 1–4.8)
LYMPHOCYTES NFR BLD: 19.6 % (ref 18–48)
MCH RBC QN AUTO: 30.6 PG (ref 27–31)
MCHC RBC AUTO-ENTMCNC: 31.8 G/DL (ref 32–36)
MCV RBC AUTO: 96 FL (ref 82–98)
MONOCYTES # BLD AUTO: 0.7 K/UL (ref 0.3–1)
MONOCYTES NFR BLD: 10.6 % (ref 4–15)
NEUTROPHILS # BLD AUTO: 3.9 K/UL (ref 1.8–7.7)
NEUTROPHILS NFR BLD: 57.6 % (ref 38–73)
NRBC BLD-RTO: 0 /100 WBC
PLATELET # BLD AUTO: 264 K/UL (ref 150–450)
PMV BLD AUTO: 9.6 FL (ref 9.2–12.9)
POTASSIUM SERPL-SCNC: 4.6 MMOL/L (ref 3.5–5.1)
PROT SERPL-MCNC: 7.5 G/DL (ref 6–8.4)
RBC # BLD AUTO: 3.43 M/UL (ref 4–5.4)
SODIUM SERPL-SCNC: 142 MMOL/L (ref 136–145)
WBC # BLD AUTO: 6.78 K/UL (ref 3.9–12.7)

## 2023-10-30 PROCEDURE — 1159F PR MEDICATION LIST DOCUMENTED IN MEDICAL RECORD: ICD-10-PCS | Mod: CPTII,S$GLB,, | Performed by: INTERNAL MEDICINE

## 2023-10-30 PROCEDURE — 3044F PR MOST RECENT HEMOGLOBIN A1C LEVEL <7.0%: ICD-10-PCS | Mod: CPTII,S$GLB,, | Performed by: INTERNAL MEDICINE

## 2023-10-30 PROCEDURE — 4010F ACE/ARB THERAPY RXD/TAKEN: CPT | Mod: CPTII,S$GLB,, | Performed by: INTERNAL MEDICINE

## 2023-10-30 PROCEDURE — 1159F MED LIST DOCD IN RCRD: CPT | Mod: CPTII,S$GLB,, | Performed by: INTERNAL MEDICINE

## 2023-10-30 PROCEDURE — 1160F RVW MEDS BY RX/DR IN RCRD: CPT | Mod: CPTII,S$GLB,, | Performed by: INTERNAL MEDICINE

## 2023-10-30 PROCEDURE — 36415 COLL VENOUS BLD VENIPUNCTURE: CPT | Performed by: INTERNAL MEDICINE

## 2023-10-30 PROCEDURE — 4010F PR ACE/ARB THEARPY RXD/TAKEN: ICD-10-PCS | Mod: CPTII,S$GLB,, | Performed by: INTERNAL MEDICINE

## 2023-10-30 PROCEDURE — 83615 LACTATE (LD) (LDH) ENZYME: CPT | Performed by: INTERNAL MEDICINE

## 2023-10-30 PROCEDURE — 3008F BODY MASS INDEX DOCD: CPT | Mod: CPTII,S$GLB,, | Performed by: INTERNAL MEDICINE

## 2023-10-30 PROCEDURE — 99999 PR PBB SHADOW E&M-EST. PATIENT-LVL V: ICD-10-PCS | Mod: PBBFAC,,, | Performed by: INTERNAL MEDICINE

## 2023-10-30 PROCEDURE — 99999 PR PBB SHADOW E&M-EST. PATIENT-LVL V: CPT | Mod: PBBFAC,,, | Performed by: INTERNAL MEDICINE

## 2023-10-30 PROCEDURE — 3008F PR BODY MASS INDEX (BMI) DOCUMENTED: ICD-10-PCS | Mod: CPTII,S$GLB,, | Performed by: INTERNAL MEDICINE

## 2023-10-30 PROCEDURE — 3077F PR MOST RECENT SYSTOLIC BLOOD PRESSURE >= 140 MM HG: ICD-10-PCS | Mod: CPTII,S$GLB,, | Performed by: INTERNAL MEDICINE

## 2023-10-30 PROCEDURE — 99215 OFFICE O/P EST HI 40 MIN: CPT | Mod: S$GLB,,, | Performed by: INTERNAL MEDICINE

## 2023-10-30 PROCEDURE — 3077F SYST BP >= 140 MM HG: CPT | Mod: CPTII,S$GLB,, | Performed by: INTERNAL MEDICINE

## 2023-10-30 PROCEDURE — 80053 COMPREHEN METABOLIC PANEL: CPT | Performed by: INTERNAL MEDICINE

## 2023-10-30 PROCEDURE — 3078F PR MOST RECENT DIASTOLIC BLOOD PRESSURE < 80 MM HG: ICD-10-PCS | Mod: CPTII,S$GLB,, | Performed by: INTERNAL MEDICINE

## 2023-10-30 PROCEDURE — 3044F HG A1C LEVEL LT 7.0%: CPT | Mod: CPTII,S$GLB,, | Performed by: INTERNAL MEDICINE

## 2023-10-30 PROCEDURE — 99215 PR OFFICE/OUTPT VISIT, EST, LEVL V, 40-54 MIN: ICD-10-PCS | Mod: S$GLB,,, | Performed by: INTERNAL MEDICINE

## 2023-10-30 PROCEDURE — 1160F PR REVIEW ALL MEDS BY PRESCRIBER/CLIN PHARMACIST DOCUMENTED: ICD-10-PCS | Mod: CPTII,S$GLB,, | Performed by: INTERNAL MEDICINE

## 2023-10-30 PROCEDURE — 85025 COMPLETE CBC W/AUTO DIFF WBC: CPT | Performed by: INTERNAL MEDICINE

## 2023-10-30 PROCEDURE — 3078F DIAST BP <80 MM HG: CPT | Mod: CPTII,S$GLB,, | Performed by: INTERNAL MEDICINE

## 2023-10-30 RX ORDER — AMLODIPINE AND OLMESARTAN MEDOXOMIL 5; 20 MG/1; MG/1
1 TABLET ORAL DAILY
Status: ON HOLD | COMMUNITY
Start: 2023-10-25 | End: 2024-03-01 | Stop reason: HOSPADM

## 2023-10-30 RX ORDER — CYCLOSPORINE 0.5 MG/ML
1 EMULSION OPHTHALMIC 2 TIMES DAILY
COMMUNITY
Start: 2023-10-25

## 2023-10-30 NOTE — PROGRESS NOTES
Nelsonr emailed pt list of resources found on Social Security Administration website, Department of Children and Family Services website along with information for free one-on-one legal and financial navigation through Triage Cancer. Tyrone emailed to pt email ([email or web address]) per pt request. Swer will remain available.

## 2023-10-30 NOTE — PROGRESS NOTES
Subjective:       Patient ID: JACOBO COLEMAN is a 62 y.o. female.    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  Patient is currently on  OSIMERTINIB   80 mg daily patient is tolerating therapy well returns with repeat laboratory studies EKG.  ECOG status 1    Past Medical History:   Diagnosis Date    Malignant neoplasm of upper lobe of left lung 2023    Thyroid disease      Family History   Problem Relation Age of Onset    Heart failure Mother     Diabetes Mother     Rheum arthritis Father     Diabetes Brother      Social History     Socioeconomic History    Marital status:    Tobacco Use    Smoking status: Former     Current packs/day: 0.00     Types: Cigarettes     Quit date:      Years since quittin.8    Smokeless tobacco: Never   Substance and Sexual Activity    Alcohol use: Yes     Alcohol/week: 2.0 standard drinks of alcohol     Types: 2 Glasses of wine per week     Comment: socially    Drug use: Not Currently    Sexual activity: Not Currently   Social History Narrative    ** Merged History Encounter **          Past Surgical History:   Procedure Laterality Date    CHOLECYSTECTOMY      ENDOBRONCHIAL ULTRASOUND Left 2023    Procedure: ENDOBRONCHIAL ULTRASOUND (EBUS);  Surgeon: Bam Catalan MD;  Location: [facility];  Service: Pulmonary;  Laterality: Left;    HYSTERECTOMY      NAVIGATIONAL BRONCHOSCOPY Left 2023    Procedure: BRONCHOSCOPY, NAVIGATIONAL;  Surgeon: Bam Catalan MD;  Location: [facility];  Service: Pulmonary;  Laterality: Left;       Labs:  Lab Results   Component Value Date    WBC 6.78 10/30/2023    HGB 10.5 (L) 10/30/2023    HCT 33.0 (L) 10/30/2023    MCV 96 10/30/2023     10/30/2023     BMP  Lab Results   Component Value Date     10/12/2023    K 4.6 10/12/2023     10/12/2023    CO2 23 10/12/2023    BUN 18 10/12/2023     "CREATININE 1.3 10/12/2023    CALCIUM 9.1 10/12/2023    ANIONGAP 12 10/12/2023     Lab Results   Component Value Date    ALT 52 (H) 10/12/2023    AST 71 (H) 10/12/2023    ALKPHOS 116 10/12/2023    BILITOT 0.2 10/12/2023       No results found for: "IRON", "TIBC", "FERRITIN", "SATURATEDIRO"  No results found for: "HPKAENCM74"  No results found for: "FOLATE"  Lab Results   Component Value Date    TSH 2.460 09/18/2023         Review of Systems   Constitutional:  Negative for activity change, appetite change, chills, diaphoresis, fatigue, fever and unexpected weight change.   HENT:  Negative for congestion, dental problem, drooling, ear discharge, ear pain, facial swelling, hearing loss, mouth sores, nosebleeds, postnasal drip, rhinorrhea, sinus pressure, sneezing, sore throat, tinnitus, trouble swallowing and voice change.    Eyes:  Negative for photophobia, pain, discharge, redness, itching and visual disturbance.   Respiratory:  Negative for cough, choking, chest tightness, shortness of breath, wheezing and stridor.    Cardiovascular:  Negative for chest pain, palpitations and leg swelling.   Gastrointestinal:  Negative for abdominal distention, abdominal pain, anal bleeding, blood in stool, constipation, diarrhea, nausea, rectal pain and vomiting.   Endocrine: Negative for cold intolerance, heat intolerance, polydipsia, polyphagia and polyuria.   Genitourinary:  Negative for decreased urine volume, difficulty urinating, dyspareunia, dysuria, enuresis, flank pain, frequency, genital sores, hematuria, menstrual problem, pelvic pain, urgency, vaginal bleeding, vaginal discharge and vaginal pain.   Musculoskeletal:  Negative for arthralgias, back pain, gait problem, joint swelling, myalgias, neck pain and neck stiffness.   Skin:  Negative for color change, pallor and rash.   Allergic/Immunologic: Negative for environmental allergies, food allergies and immunocompromised state.   Neurological:  Negative for dizziness, " tremors, seizures, syncope, facial asymmetry, speech difficulty, weakness, light-headedness, numbness and headaches.   Hematological:  Negative for adenopathy. Does not bruise/bleed easily.   Psychiatric/Behavioral:  Negative for agitation, behavioral problems, confusion, decreased concentration, dysphoric mood, hallucinations, self-injury, sleep disturbance and suicidal ideas. The patient is not nervous/anxious and is not hyperactive.        Objective:      Physical Exam  Vitals reviewed.   Constitutional:       General: She is not in acute distress.     Appearance: She is well-developed. She is not diaphoretic.   HENT:      Head: Normocephalic and atraumatic.      Right Ear: External ear normal.      Left Ear: External ear normal.      Nose: Nose normal.      Right Sinus: No maxillary sinus tenderness or frontal sinus tenderness.      Left Sinus: No maxillary sinus tenderness or frontal sinus tenderness.      Mouth/Throat:      Pharynx: No oropharyngeal exudate.   Eyes:      General: Lids are normal. No scleral icterus.        Right eye: No discharge.         Left eye: No discharge.      Conjunctiva/sclera: Conjunctivae normal.      Right eye: Right conjunctiva is not injected. No hemorrhage.     Left eye: Left conjunctiva is not injected. No hemorrhage.     Pupils: Pupils are equal, round, and reactive to light.   Neck:      Thyroid: No thyromegaly.      Vascular: No JVD.      Trachea: No tracheal deviation.   Cardiovascular:      Rate and Rhythm: Normal rate.   Pulmonary:      Effort: Pulmonary effort is normal. No respiratory distress.      Breath sounds: No stridor.   Chest:      Chest wall: No tenderness.   Abdominal:      General: Bowel sounds are normal. There is no distension.      Palpations: Abdomen is soft. There is no hepatomegaly, splenomegaly or mass.      Tenderness: There is no abdominal tenderness. There is no rebound.   Musculoskeletal:         General: No tenderness. Normal range of motion.       Cervical back: Normal range of motion and neck supple.   Lymphadenopathy:      Cervical: No cervical adenopathy.      Upper Body:      Right upper body: No supraclavicular adenopathy.      Left upper body: No supraclavicular adenopathy.   Skin:     General: Skin is dry.      Findings: No erythema or rash.   Neurological:      Mental Status: She is alert and oriented to person, place, and time.      Cranial Nerves: No cranial nerve deficit.      Coordination: Coordination normal.   Psychiatric:         Behavior: Behavior normal.         Thought Content: Thought content normal.         Judgment: Judgment normal.             Assessment:      1. EGFR-related lung cancer    2. Immunodeficiency due to chemotherapy    3. Malignant neoplasm of upper lobe of left lung    4. Secondary malignancy of parietal pleura    5. Secondary malignant neoplasm of bone           Med Onc Chart Routing      Follow up with physician . Return to clinic to see me in approximately 6-8 weeks with CBC CMP TSH and EKG with PET scan prior   Follow up with [facility]    Infusion scheduling note    Injection scheduling note    Labs   Scheduling:  Preferred lab:  Lab interval:  CBC CMP TSH in EKG in 3 weeks does not need a physical visit can communicate through portal   Imaging    Pharmacy appointment    Other referrals                   Plan:     Reviewed laboratory studies at this point doing well feels good EKG no evidence of QTC interval.  Follow-up with CBC CMP TSH in 3 weeks EKG.  In also proceed with follow-up 1 month afterwards with CBC CMP TSH and PET scan for response to therapy is far as I am concerned patient can return to light duty and if she feels like she wants to do more certainly can do so cancel Neurology appointment MRI brain negative        Zenon Cheney Jr, MD FACP

## 2023-10-30 NOTE — PROGRESS NOTES
"Swer was consulted to meet pt following today's clinic appt. Swer introduced self. Swer assessed pt.'s needs today. Pt. inquired about assistance with applying for SSDI. 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 She reports about one year ago, she was competing with 30 year-old rock climbers and finds after her tx she received at [facility], she hasn't been the same. She reports she was "chair bound" not very long ago. Swer inquired if pt received PT for this and pt denies stating she tries her best to push herself. She is hopeful to regain her strength prior to her tx by January of 2024. She reports her 11 year-old granddaughter lives with her currently and has been under her care since she was 2 years old. She had questions regarding SSDI and how this would carry over. Swer reported to f/u with pt on this. Pt. agreeable to this and to swer emailing pt resources. Swer also discussed referral for assistance with applying for SSDI which pt was agreeable to. Swer emailed pt referral today. Swer provided pt with "new pt folder," along with sw business card for any other needs that may arise. Pt. reports her main concern is applying for SSDI. She also inquried about completeing a LaPOST as well as HCPOA paperwork. Swer and pt completed LaPOST which pt selected DNR and MD reviewed and signed. Pt.'s LaPOST scanned to her chart with help of clinic staff. Pt. provided with copy today. Swer also provided HCPOA paperwork, which pt will complete at a later date. There were no other needs. Swer will remain available.   "

## 2023-11-08 ENCOUNTER — PATIENT MESSAGE (OUTPATIENT)
Dept: ADMINISTRATIVE | Facility: HOSPITAL | Age: 63
End: 2023-11-08
Payer: COMMERCIAL

## 2023-11-20 ENCOUNTER — LAB VISIT (OUTPATIENT)
Dept: LAB | Facility: HOSPITAL | Age: 63
End: 2023-11-20
Attending: INTERNAL MEDICINE
Payer: COMMERCIAL

## 2023-11-20 DIAGNOSIS — C34.90 MALIGNANT NEOPLASM OF UNSPECIFIED PART OF UNSPECIFIED BRONCHUS OR LUNG: ICD-10-CM

## 2023-11-20 DIAGNOSIS — T45.1X5A IMMUNODEFICIENCY DUE TO CHEMOTHERAPY: ICD-10-CM

## 2023-11-20 DIAGNOSIS — Z79.899 IMMUNODEFICIENCY DUE TO CHEMOTHERAPY: ICD-10-CM

## 2023-11-20 DIAGNOSIS — D84.821 IMMUNODEFICIENCY DUE TO CHEMOTHERAPY: ICD-10-CM

## 2023-11-20 LAB
ALBUMIN SERPL BCP-MCNC: 3.7 G/DL (ref 3.5–5.2)
ALP SERPL-CCNC: 92 U/L (ref 55–135)
ALT SERPL W/O P-5'-P-CCNC: 36 U/L (ref 10–44)
ANION GAP SERPL CALC-SCNC: 12 MMOL/L (ref 8–16)
AST SERPL-CCNC: 46 U/L (ref 10–40)
BASOPHILS # BLD AUTO: 0.03 K/UL (ref 0–0.2)
BASOPHILS NFR BLD: 0.7 % (ref 0–1.9)
BILIRUB SERPL-MCNC: 0.2 MG/DL (ref 0.1–1)
BUN SERPL-MCNC: 16 MG/DL (ref 8–23)
CALCIUM SERPL-MCNC: 8.7 MG/DL (ref 8.7–10.5)
CHLORIDE SERPL-SCNC: 109 MMOL/L (ref 95–110)
CO2 SERPL-SCNC: 22 MMOL/L (ref 23–29)
CREAT SERPL-MCNC: 1.4 MG/DL (ref 0.5–1.4)
DIFFERENTIAL METHOD: ABNORMAL
EOSINOPHIL # BLD AUTO: 0.5 K/UL (ref 0–0.5)
EOSINOPHIL NFR BLD: 11.5 % (ref 0–8)
ERYTHROCYTE [DISTWIDTH] IN BLOOD BY AUTOMATED COUNT: 13.3 % (ref 11.5–14.5)
EST. GFR  (NO RACE VARIABLE): 42 ML/MIN/1.73 M^2
GLUCOSE SERPL-MCNC: 101 MG/DL (ref 70–110)
HCT VFR BLD AUTO: 30.4 % (ref 37–48.5)
HGB BLD-MCNC: 9.4 G/DL (ref 12–16)
IMM GRANULOCYTES # BLD AUTO: 0.01 K/UL (ref 0–0.04)
IMM GRANULOCYTES NFR BLD AUTO: 0.2 % (ref 0–0.5)
LYMPHOCYTES # BLD AUTO: 1.1 K/UL (ref 1–4.8)
LYMPHOCYTES NFR BLD: 24.7 % (ref 18–48)
MCH RBC QN AUTO: 29.4 PG (ref 27–31)
MCHC RBC AUTO-ENTMCNC: 30.9 G/DL (ref 32–36)
MCV RBC AUTO: 95 FL (ref 82–98)
MONOCYTES # BLD AUTO: 0.5 K/UL (ref 0.3–1)
MONOCYTES NFR BLD: 11.5 % (ref 4–15)
NEUTROPHILS # BLD AUTO: 2.3 K/UL (ref 1.8–7.7)
NEUTROPHILS NFR BLD: 51.4 % (ref 38–73)
NRBC BLD-RTO: 0 /100 WBC
PLATELET # BLD AUTO: 267 K/UL (ref 150–450)
PMV BLD AUTO: 10 FL (ref 9.2–12.9)
POTASSIUM SERPL-SCNC: 4.4 MMOL/L (ref 3.5–5.1)
PROT SERPL-MCNC: 7.4 G/DL (ref 6–8.4)
RBC # BLD AUTO: 3.2 M/UL (ref 4–5.4)
SODIUM SERPL-SCNC: 143 MMOL/L (ref 136–145)
TSH SERPL DL<=0.005 MIU/L-ACNC: 0.94 UIU/ML (ref 0.4–4)
WBC # BLD AUTO: 4.53 K/UL (ref 3.9–12.7)

## 2023-11-20 PROCEDURE — 36415 COLL VENOUS BLD VENIPUNCTURE: CPT | Mod: PN | Performed by: INTERNAL MEDICINE

## 2023-11-20 PROCEDURE — 84443 ASSAY THYROID STIM HORMONE: CPT | Performed by: INTERNAL MEDICINE

## 2023-11-20 PROCEDURE — 85025 COMPLETE CBC W/AUTO DIFF WBC: CPT | Performed by: INTERNAL MEDICINE

## 2023-11-20 PROCEDURE — 80053 COMPREHEN METABOLIC PANEL: CPT | Performed by: INTERNAL MEDICINE

## 2023-11-30 DIAGNOSIS — C34.12 MALIGNANT NEOPLASM OF UPPER LOBE OF LEFT LUNG: Primary | ICD-10-CM

## 2023-12-04 ENCOUNTER — PATIENT MESSAGE (OUTPATIENT)
Dept: HEMATOLOGY/ONCOLOGY | Facility: CLINIC | Age: 63
End: 2023-12-04
Payer: COMMERCIAL

## 2023-12-07 ENCOUNTER — TELEPHONE (OUTPATIENT)
Dept: HEMATOLOGY/ONCOLOGY | Facility: CLINIC | Age: 63
End: 2023-12-07
Payer: COMMERCIAL

## 2023-12-07 ENCOUNTER — PATIENT MESSAGE (OUTPATIENT)
Dept: ADMINISTRATIVE | Facility: OTHER | Age: 63
End: 2023-12-07
Payer: COMMERCIAL

## 2023-12-07 NOTE — NURSING
1454 pm: Incoming call from pt regarding s/s. Spoke with pt. Pt reported loose stools and heartburn for several days. Pt reported being on Protonix already and following BRAT occasionally. Pt instructed to take Imodium A-D now and every 2 hours until diarrhea subsides. If diarrhea doesn't subsides on Imodium A-D in 24 hrs then call me back directly so prescription for Lomotil can be obtained. Pt advised to drink lots of fluids like water, Gatorade, Body Armor, and/or Powerade to maintain hydration and electrolytes and to continue to adhere to BRAT diet consistently. Pt verbalized understanding.   Oncology Navigation   Intake  Date of Diagnosis: 02/01/23 (at [facility])  Cancer Type: Other  Internal / External Referral: External (self-referral for 2nd opinion)  Date of Referral: 08/30/23  Initial Nurse Navigator Contact: 09/27/23  Referral to Initial Contact Timeline (days): 28  Date Worked: 12/07/23  Appointment Date: 09/08/23  Schedule to Appointment Timeline (days): -31  Multiple appointments: No  Start of Treatment: 10/23/23 (started oral Tagrisso)     Treatment  Current Status: Active       Medical Oncologist: Dr. Zenon Cheney  Oral Therapy: Planned (Osimertinib (Tagrisso))       Procedures: PET scan  PET Scan Schedule Date: 10/12/23                 Acuity      Follow Up  No follow-ups on file.

## 2023-12-12 ENCOUNTER — INFUSION (OUTPATIENT)
Dept: INFUSION THERAPY | Facility: HOSPITAL | Age: 63
End: 2023-12-12
Payer: COMMERCIAL

## 2023-12-12 ENCOUNTER — LAB VISIT (OUTPATIENT)
Dept: LAB | Facility: HOSPITAL | Age: 63
End: 2023-12-12
Attending: INTERNAL MEDICINE
Payer: COMMERCIAL

## 2023-12-12 ENCOUNTER — OFFICE VISIT (OUTPATIENT)
Dept: HEMATOLOGY/ONCOLOGY | Facility: CLINIC | Age: 63
End: 2023-12-12
Payer: COMMERCIAL

## 2023-12-12 ENCOUNTER — OFFICE VISIT (OUTPATIENT)
Dept: PALLIATIVE MEDICINE | Facility: CLINIC | Age: 63
End: 2023-12-12
Payer: COMMERCIAL

## 2023-12-12 VITALS
HEART RATE: 75 BPM | SYSTOLIC BLOOD PRESSURE: 135 MMHG | BODY MASS INDEX: 25.25 KG/M2 | DIASTOLIC BLOOD PRESSURE: 84 MMHG | TEMPERATURE: 97 F | OXYGEN SATURATION: 97 % | HEIGHT: 70 IN | WEIGHT: 176.38 LBS

## 2023-12-12 VITALS
DIASTOLIC BLOOD PRESSURE: 65 MMHG | HEART RATE: 79 BPM | OXYGEN SATURATION: 98 % | TEMPERATURE: 98 F | RESPIRATION RATE: 18 BRPM | SYSTOLIC BLOOD PRESSURE: 128 MMHG

## 2023-12-12 VITALS
DIASTOLIC BLOOD PRESSURE: 74 MMHG | HEART RATE: 84 BPM | SYSTOLIC BLOOD PRESSURE: 140 MMHG | OXYGEN SATURATION: 98 % | WEIGHT: 176.38 LBS | BODY MASS INDEX: 25.25 KG/M2 | TEMPERATURE: 98 F | HEIGHT: 70 IN

## 2023-12-12 DIAGNOSIS — E86.0 DEHYDRATION: ICD-10-CM

## 2023-12-12 DIAGNOSIS — R35.0 INCREASED FREQUENCY OF URINATION: ICD-10-CM

## 2023-12-12 DIAGNOSIS — D84.821 IMMUNODEFICIENCY DUE TO CHEMOTHERAPY: ICD-10-CM

## 2023-12-12 DIAGNOSIS — R10.2 PELVIC PRESSURE IN FEMALE: ICD-10-CM

## 2023-12-12 DIAGNOSIS — C79.51 SECONDARY MALIGNANT NEOPLASM OF BONE: ICD-10-CM

## 2023-12-12 DIAGNOSIS — Z79.899 IMMUNODEFICIENCY DUE TO CHEMOTHERAPY: Primary | ICD-10-CM

## 2023-12-12 DIAGNOSIS — C34.90 EGFR-RELATED LUNG CANCER: ICD-10-CM

## 2023-12-12 DIAGNOSIS — T45.1X5A IMMUNODEFICIENCY DUE TO CHEMOTHERAPY: ICD-10-CM

## 2023-12-12 DIAGNOSIS — D84.821 IMMUNODEFICIENCY DUE TO CHEMOTHERAPY: Primary | ICD-10-CM

## 2023-12-12 DIAGNOSIS — T45.1X5A IMMUNODEFICIENCY DUE TO CHEMOTHERAPY: Primary | ICD-10-CM

## 2023-12-12 DIAGNOSIS — C34.12 MALIGNANT NEOPLASM OF UPPER LOBE OF LEFT LUNG: Primary | ICD-10-CM

## 2023-12-12 DIAGNOSIS — C78.2 SECONDARY MALIGNANCY OF PARIETAL PLEURA: ICD-10-CM

## 2023-12-12 DIAGNOSIS — Z51.5 PALLIATIVE CARE ENCOUNTER: ICD-10-CM

## 2023-12-12 DIAGNOSIS — E86.0 DEHYDRATION: Primary | ICD-10-CM

## 2023-12-12 DIAGNOSIS — C34.90 MALIGNANT NEOPLASM OF UNSPECIFIED PART OF UNSPECIFIED BRONCHUS OR LUNG: ICD-10-CM

## 2023-12-12 DIAGNOSIS — Z79.899 IMMUNODEFICIENCY DUE TO CHEMOTHERAPY: ICD-10-CM

## 2023-12-12 DIAGNOSIS — C34.90 MALIGNANT NEOPLASM OF UNSPECIFIED PART OF UNSPECIFIED BRONCHUS OR LUNG: Primary | ICD-10-CM

## 2023-12-12 LAB
ALBUMIN SERPL BCP-MCNC: 3.8 G/DL (ref 3.5–5.2)
ALP SERPL-CCNC: 94 U/L (ref 55–135)
ALT SERPL W/O P-5'-P-CCNC: 53 U/L (ref 10–44)
ANION GAP SERPL CALC-SCNC: 11 MMOL/L (ref 8–16)
AST SERPL-CCNC: 59 U/L (ref 10–40)
BASOPHILS # BLD AUTO: 0.01 K/UL (ref 0–0.2)
BASOPHILS NFR BLD: 0.3 % (ref 0–1.9)
BILIRUB SERPL-MCNC: 0.2 MG/DL (ref 0.1–1)
BUN SERPL-MCNC: 15 MG/DL (ref 8–23)
CALCIUM SERPL-MCNC: 9 MG/DL (ref 8.7–10.5)
CHLORIDE SERPL-SCNC: 106 MMOL/L (ref 95–110)
CO2 SERPL-SCNC: 23 MMOL/L (ref 23–29)
CREAT SERPL-MCNC: 1.5 MG/DL (ref 0.5–1.4)
DIFFERENTIAL METHOD: ABNORMAL
EOSINOPHIL # BLD AUTO: 0.2 K/UL (ref 0–0.5)
EOSINOPHIL NFR BLD: 4 % (ref 0–8)
ERYTHROCYTE [DISTWIDTH] IN BLOOD BY AUTOMATED COUNT: 12.2 % (ref 11.5–14.5)
EST. GFR  (NO RACE VARIABLE): 39 ML/MIN/1.73 M^2
GLUCOSE SERPL-MCNC: 145 MG/DL (ref 70–110)
HCT VFR BLD AUTO: 33.7 % (ref 37–48.5)
HGB BLD-MCNC: 10.6 G/DL (ref 12–16)
IMM GRANULOCYTES # BLD AUTO: 0.01 K/UL (ref 0–0.04)
IMM GRANULOCYTES NFR BLD AUTO: 0.3 % (ref 0–0.5)
LYMPHOCYTES # BLD AUTO: 0.9 K/UL (ref 1–4.8)
LYMPHOCYTES NFR BLD: 23.7 % (ref 18–48)
MCH RBC QN AUTO: 29 PG (ref 27–31)
MCHC RBC AUTO-ENTMCNC: 31.5 G/DL (ref 32–36)
MCV RBC AUTO: 92 FL (ref 82–98)
MONOCYTES # BLD AUTO: 0.5 K/UL (ref 0.3–1)
MONOCYTES NFR BLD: 11.4 % (ref 4–15)
NEUTROPHILS # BLD AUTO: 2.4 K/UL (ref 1.8–7.7)
NEUTROPHILS NFR BLD: 60.3 % (ref 38–73)
NRBC BLD-RTO: 0 /100 WBC
PLATELET # BLD AUTO: 282 K/UL (ref 150–450)
PMV BLD AUTO: 9.1 FL (ref 9.2–12.9)
POTASSIUM SERPL-SCNC: 4.4 MMOL/L (ref 3.5–5.1)
PROT SERPL-MCNC: 7.6 G/DL (ref 6–8.4)
RBC # BLD AUTO: 3.65 M/UL (ref 4–5.4)
SODIUM SERPL-SCNC: 140 MMOL/L (ref 136–145)
TSH SERPL DL<=0.005 MIU/L-ACNC: 1.44 UIU/ML (ref 0.4–4)
WBC # BLD AUTO: 3.96 K/UL (ref 3.9–12.7)

## 2023-12-12 PROCEDURE — 3075F PR MOST RECENT SYSTOLIC BLOOD PRESS GE 130-139MM HG: ICD-10-PCS | Mod: CPTII,S$GLB,,

## 2023-12-12 PROCEDURE — 25000003 PHARM REV CODE 250

## 2023-12-12 PROCEDURE — 99203 OFFICE O/P NEW LOW 30 MIN: CPT | Mod: S$GLB,,,

## 2023-12-12 PROCEDURE — 84443 ASSAY THYROID STIM HORMONE: CPT | Performed by: INTERNAL MEDICINE

## 2023-12-12 PROCEDURE — 99215 PR OFFICE/OUTPT VISIT, EST, LEVL V, 40-54 MIN: ICD-10-PCS | Mod: S$GLB,,,

## 2023-12-12 PROCEDURE — 4010F PR ACE/ARB THEARPY RXD/TAKEN: ICD-10-PCS | Mod: CPTII,S$GLB,,

## 2023-12-12 PROCEDURE — 99215 OFFICE O/P EST HI 40 MIN: CPT | Mod: S$GLB,,,

## 2023-12-12 PROCEDURE — 3008F BODY MASS INDEX DOCD: CPT | Mod: CPTII,S$GLB,,

## 2023-12-12 PROCEDURE — 99499 NO LOS: ICD-10-PCS | Mod: S$GLB,,,

## 2023-12-12 PROCEDURE — 3079F DIAST BP 80-89 MM HG: CPT | Mod: CPTII,S$GLB,,

## 2023-12-12 PROCEDURE — 85025 COMPLETE CBC W/AUTO DIFF WBC: CPT | Performed by: INTERNAL MEDICINE

## 2023-12-12 PROCEDURE — 99999 PR PBB SHADOW E&M-EST. PATIENT-LVL V: ICD-10-PCS | Mod: PBBFAC,,,

## 2023-12-12 PROCEDURE — 3078F PR MOST RECENT DIASTOLIC BLOOD PRESSURE < 80 MM HG: ICD-10-PCS | Mod: CPTII,S$GLB,,

## 2023-12-12 PROCEDURE — 99497 PR ADVNCD CARE PLAN 30 MIN: ICD-10-PCS | Mod: S$GLB,,,

## 2023-12-12 PROCEDURE — 3078F DIAST BP <80 MM HG: CPT | Mod: CPTII,S$GLB,,

## 2023-12-12 PROCEDURE — 99999 PR PBB SHADOW E&M-EST. PATIENT-LVL V: CPT | Mod: PBBFAC,,,

## 2023-12-12 PROCEDURE — 3044F PR MOST RECENT HEMOGLOBIN A1C LEVEL <7.0%: ICD-10-PCS | Mod: CPTII,S$GLB,,

## 2023-12-12 PROCEDURE — 96361 HYDRATE IV INFUSION ADD-ON: CPT

## 2023-12-12 PROCEDURE — 3079F PR MOST RECENT DIASTOLIC BLOOD PRESSURE 80-89 MM HG: ICD-10-PCS | Mod: CPTII,S$GLB,,

## 2023-12-12 PROCEDURE — 36415 COLL VENOUS BLD VENIPUNCTURE: CPT | Performed by: INTERNAL MEDICINE

## 2023-12-12 PROCEDURE — 99499 UNLISTED E&M SERVICE: CPT | Mod: S$GLB,,,

## 2023-12-12 PROCEDURE — 99203 PR OFFICE/OUTPT VISIT, NEW, LEVL III, 30-44 MIN: ICD-10-PCS | Mod: S$GLB,,,

## 2023-12-12 PROCEDURE — 3077F SYST BP >= 140 MM HG: CPT | Mod: CPTII,S$GLB,,

## 2023-12-12 PROCEDURE — 3008F PR BODY MASS INDEX (BMI) DOCUMENTED: ICD-10-PCS | Mod: CPTII,S$GLB,,

## 2023-12-12 PROCEDURE — 3044F HG A1C LEVEL LT 7.0%: CPT | Mod: CPTII,S$GLB,,

## 2023-12-12 PROCEDURE — 80053 COMPREHEN METABOLIC PANEL: CPT | Performed by: INTERNAL MEDICINE

## 2023-12-12 PROCEDURE — 4010F ACE/ARB THERAPY RXD/TAKEN: CPT | Mod: CPTII,S$GLB,,

## 2023-12-12 PROCEDURE — 96360 HYDRATION IV INFUSION INIT: CPT

## 2023-12-12 PROCEDURE — 3077F PR MOST RECENT SYSTOLIC BLOOD PRESSURE >= 140 MM HG: ICD-10-PCS | Mod: CPTII,S$GLB,,

## 2023-12-12 PROCEDURE — 3075F SYST BP GE 130 - 139MM HG: CPT | Mod: CPTII,S$GLB,,

## 2023-12-12 PROCEDURE — 99497 ADVNCD CARE PLAN 30 MIN: CPT | Mod: S$GLB,,,

## 2023-12-12 RX ORDER — CIPROFLOXACIN 500 MG/1
500 TABLET ORAL DAILY
Qty: 5 TABLET | Refills: 0 | Status: SHIPPED | OUTPATIENT
Start: 2023-12-12 | End: 2023-12-17

## 2023-12-12 RX ORDER — HEPARIN 100 UNIT/ML
500 SYRINGE INTRAVENOUS
OUTPATIENT
Start: 2023-12-12

## 2023-12-12 RX ORDER — SODIUM CHLORIDE 0.9 % (FLUSH) 0.9 %
10 SYRINGE (ML) INJECTION
OUTPATIENT
Start: 2023-12-12

## 2023-12-12 RX ORDER — SODIUM CHLORIDE 0.9 % (FLUSH) 0.9 %
10 SYRINGE (ML) INJECTION
Status: CANCELLED | OUTPATIENT
Start: 2023-12-12

## 2023-12-12 RX ORDER — HEPARIN 100 UNIT/ML
500 SYRINGE INTRAVENOUS
Status: CANCELLED | OUTPATIENT
Start: 2023-12-12

## 2023-12-12 RX ADMIN — SODIUM CHLORIDE 1000 ML: 9 INJECTION, SOLUTION INTRAVENOUS at 11:12

## 2023-12-12 NOTE — PLAN OF CARE
Pt here for iv fluids, having diarrhea and vomiting since 12/2/23.    Problem: Adult Inpatient Plan of Care  Goal: Plan of Care Review  Outcome: Ongoing, Progressing  Flowsheets (Taken 12/12/2023 1210)  Plan of Care Reviewed With: patient  Goal: Patient-Specific Goal (Individualized)  Outcome: Ongoing, Progressing  Flowsheets (Taken 12/12/2023 1210)  Anxieties, Fears or Concerns: diarrhea  Individualized Care Needs: warm blanket,  Goal: Absence of Hospital-Acquired Illness or Injury  Outcome: Ongoing, Progressing  Intervention: Prevent Infection  Flowsheets (Taken 12/12/2023 1210)  Infection Prevention:   equipment surfaces disinfected   personal protective equipment utilized   hand hygiene promoted  Goal: Optimal Comfort and Wellbeing  Outcome: Ongoing, Progressing  Intervention: Provide Person-Centered Care  Flowsheets (Taken 12/12/2023 1210)  Trust Relationship/Rapport:   care explained   choices provided   emotional support provided   empathic listening provided   questions answered   reassurance provided   questions encouraged   thoughts/feelings acknowledged     Problem: Fluid Volume Deficit  Goal: Fluid Balance  Outcome: Ongoing, Progressing  Intervention: Monitor and Manage Hypovolemia  Flowsheets (Taken 12/12/2023 1210)  Fluid/Electrolyte Management: intravenous fluid replacement initiated

## 2023-12-12 NOTE — PROGRESS NOTES
"Palliative Medicine  Consult  Consult Requested By: Dr. Zenon Cheney  Reason for Consult: Symptom Management/Advance Care Planning/Goals of Care Discussion       SUBJECTIVE:     History of Present Illness:  JACOBO COLEMAN is a 62 y.o. 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 Resection was attempted, but found to have metastatic disease to the pleural space with malignant involvement of pleural effusion.   She is receiving Osimertinib daily and Zometa Q4W. Previously received:  carboplatin, trimetrexate, and Keytruda   She is followed by Dr. Cheney. Previously seen by Dr. Clotilde Fields ([facility])  The palliative care team was consulted to assist with symptom management, advance care planning, and goals of care discussion.       Pt attended clinic with her partner, Mr. Hernandez. She was in no acute distress. Vital signs stable on room air.   I explained the role palliative care often plays in supporting patients with serious illness and their families regarding symptom management, advance care planning, and goals of care discussion.  Pt verbalized understanding.   Pt reported mid-back and neck pain 4/10. She has a history of chronic back pain following spinal injury in her teens. She has had previous steroid injections and used medical marijuana in the past. She is currently managing her pain with Ibuprofen PRN. She is not currently using narcotics, and would prefer to use it as a last resort. We discussed increased risk for JOSÉ ANTONIO while taking Ibuprofen. Pt verbalized understanding     Pt also reported nausea/vomiting/diarrhea over the past 10 days. She is taking Imodium, with no improvement in diarrhea. She is managing nausea/vomiting with Zofran. Her PO intake has decreased, and she noted 2LB unintentional weight loss on her home scale. She noted frequent urination, pelvic pressure, and chills. She is concerned about a UTI.     She also noted "bumps" on her face and right armpit, she thinks this may be " "folliculitis.    We discussed advance care planning, goals of care, and code status, Full Code vs. DNR including risks, benefits, and alternatives. Pt reported she would like to  Continue cancer treatment, symptom management, maintain independence and functional status. 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 Rakesh completed 10/30/2023 reflecting above stated wishes. In case of cardiopulmonary arrest, pt stated, "don't touch me."  She has a HCPOA, and has elected:   1. Sukhi Adrian: Best Friend: [phone], 2. Kevin Zambrano: Partner: [phone].  She will bring a copy to palliative care clinic.     LA JOSIAH reviewed and summarized:          Past Medical History:   Diagnosis Date    Malignant neoplasm of upper lobe of left lung 1/19/2023    Thyroid disease      Past Surgical History:   Procedure Laterality Date    CHOLECYSTECTOMY      ENDOBRONCHIAL ULTRASOUND Left 1/5/2023    Procedure: ENDOBRONCHIAL ULTRASOUND (EBUS);  Surgeon: Bam Catalan MD;  Location: [facility] ENDO;  Service: Pulmonary;  Laterality: Left;    HYSTERECTOMY      NAVIGATIONAL BRONCHOSCOPY Left 1/5/2023    Procedure: BRONCHOSCOPY, NAVIGATIONAL;  Surgeon: Bam Catalan MD;  Location: [facility] ENDO;  Service: Pulmonary;  Laterality: Left;     Family History   Problem Relation Age of Onset    Heart failure Mother     Diabetes Mother     Rheum arthritis Father     Diabetes Brother      Review of patient's allergies indicates:   Allergen Reactions    Xylocaine with epinephrine [lidocaine-epinephrine] Anaphylaxis    Lipitor [atorvastatin] Other (See Comments)     Body aches      Methocarbamol-aspirin     Zetia [ezetimibe] Other (See Comments)     Muscle spasms      Augmentin [amoxicillin-pot clavulanate] Rash    Macrodantin [nitrofurantoin macrocrystal] Rash    Omnicef [cefdinir] Rash and Other (See Comments)     GI upset    Pravastatin Rash    Sulfa (sulfonamide " antibiotics) Rash       Medications:    Current Outpatient Medications:     albuterol (VENTOLIN HFA) 90 mcg/actuation inhaler, Inhale 2 puffs into the lungs every 6 (six) hours as needed for Wheezing. Rescue, Disp: 18 g, Rfl: 0    amlodipine-olmesartan (GREGORY) 5-20 mg per tablet, Take 1 tablet by mouth once daily., Disp: , Rfl:     azelastine (ASTELIN) 137 mcg (0.1 %) nasal spray, SMARTSIG:Both Nares, Disp: , Rfl:     cetirizine (ZYRTEC) 10 MG tablet, Take 5 mg by mouth., Disp: , Rfl:     cyclosporine (RESTASIS OPHT), Place into both eyes 2 (two) times a day., Disp: , Rfl:     eszopiclone (LUNESTA) 3 mg Tab, Take 3 mg by mouth nightly as needed., Disp: , Rfl:     FLUoxetine 20 MG capsule, Take 20 mg by mouth., Disp: , Rfl:     fluticasone propionate (FLONASE) 50 mcg/actuation nasal spray, 1 spray by Each Nostril route 2 (two) times daily., Disp: , Rfl:     folic acid (FOLVITE) 1 MG tablet, Take 1 tablet by mouth every morning., Disp: , Rfl:     hydrocortisone 2.5 % lotion, Apply topically., Disp: , Rfl:     hydrOXYzine HCL (ATARAX) 25 MG tablet, Take 25 mg by mouth., Disp: , Rfl:     ibuprofen (ADVIL,MOTRIN) 200 MG tablet, Take by mouth every 6 (six) hours as needed., Disp: , Rfl:     ipratropium (ATROVENT) 21 mcg (0.03 %) nasal spray, SMARTSI Spray(s) Both Nares 2-3 Times Daily, Disp: , Rfl:     levothyroxine (SYNTHROID) 88 MCG tablet, Take 88 mcg by mouth., Disp: , Rfl:     methylphenidate HCl 18 MG CR tablet, Take 18 mg by mouth every morning., Disp: , Rfl:     multivitamin capsule, Take 1 capsule by mouth once daily., Disp: , Rfl:     mupirocin (BACTROBAN) 2 % ointment, Apply topically 3 (three) times daily., Disp: , Rfl:     OLANZapine (ZYPREXA) 5 MG tablet, Take 5 mg by mouth every evening., Disp: , Rfl:     ondansetron (ZOFRAN-ODT) 4 MG TbDL, Take 2 mg by mouth every 6 (six) hours as needed., Disp: , Rfl:     osimertinib (TAGRISSO) 80 mg Tab, Take 1 tab (80 mg) by mouth once daily., Disp: 30 tablet, Rfl:  11    oxyCODONE-acetaminophen (PERCOCET) 5-325 mg per tablet, Take 1 tablet by mouth 2 (two) times daily as needed., Disp: , Rfl:     pantoprazole (PROTONIX) 40 MG tablet, Take 1 tablet by mouth every morning., Disp: , Rfl:     predniSONE (DELTASONE) 10 MG tablet, Take 1 tablet (10 mg total) by mouth once daily., Disp: 30 tablet, Rfl: 2    promethazine (PHENERGAN) 25 MG tablet, Take 25 mg by mouth every 6 (six) hours as needed., Disp: , Rfl:     TYRVAYA 0.03 mg/spray sprm, 1 spray by Each Nostril route 2 (two) times daily., Disp: , Rfl:     OBJECTIVE:     ROS:  Review of Systems   Constitutional:  Positive for fatigue and unexpected weight change. Negative for activity change, appetite change and fever.   HENT:  Negative for trouble swallowing and voice change.    Eyes: Negative.    Respiratory:  Negative for cough, chest tightness and wheezing. Shortness of breath: Exertional: Managed with Albuterol PRN.   Cardiovascular:  Negative for chest pain, palpitations and leg swelling.   Gastrointestinal:  Positive for diarrhea, nausea and vomiting. Negative for abdominal distention, abdominal pain and constipation.   Genitourinary:  Positive for frequency and pelvic pain. Negative for hematuria.   Musculoskeletal:  Positive for back pain and neck pain. Negative for arthralgias, joint swelling and myalgias.   Skin:  Positive for rash (Face and right armpit). Negative for color change, pallor and wound.   Neurological:  Negative for dizziness, tremors, speech difficulty, weakness, numbness and headaches.   Psychiatric/Behavioral:  Positive for agitation, dysphoric mood (Due to cancer diagnosis) and sleep disturbance (Chronic: On Lunesta). Negative for behavioral problems, confusion and suicidal ideas. The patient is nervous/anxious (Due to cancer diagnosis).        Review of Symptoms      Symptom Assessment (ESAS 0-10 Scale)  Pain:  4  Dyspnea:  3  Anxiety:  8  Nausea:  6  Depression:  5  Anorexia:  0  Fatigue:   2  Insomnia:  5  Restlessness:  0  Agitation:  5     CAM / Delirium:  Negative  Constipation:  Negative  Diarrhea:  Positive    Anxiety:  Is nervous/anxious (Due to cancer diagnosis)  Constipation:  No constipation    Bowel Management Plan (BMP):  Yes (Imocdium)      Pain Assessment:    Location(s): neck and torso    Neck       Location: posterior        Quality: Aching        Quantity: 4/10 in intensity        Chronicity: Onset 0 year(s) ago, unchanged        Aggravating Factors: Movement        Alleviating Factors: NSAIDs        Associated Symptoms: Arthralgias  Torso       Location: posterior        Quality: aching        Chronicity: Onset 0 year(s) ago, unchanged        Aggravating Factors: pressure        Alleviating Factors: NSAIDs        Associated Symptoms: Myalgias    Modified Gladys Scale:  0    Performance Status:  80    ECOG Performance Status ndGndrndanddndend:nd nd2nd Living Arrangements:  Lives with spouse    Psychosocial/Cultural:   See Palliative Psychosocial Note: Yes  Employed. Hospice Nurse. Now works in .   Lives with Partner, Mr. Kevin Zambrano.   **Primary  to Follow**  Palliative Care  Consult: No      Advance Care Planning   Advance Directives:   Living Will: Yes (LaPOST)        Copy on chart: Yes    LaPOST: Yes (Completed 10/30/2023)    Do Not Resuscitate Status: Yes    Medical Power of : Yes (Pt to bring copy)    Agent's Name:  Sukhi Adrian: Best Friend: [phone] Kevin Zambrano: Partner: [phone]    Decision Making:  Patient answered questions  Goals of Care: The patient endorses that what is most important right now is to focus on remaining as independent as possible, symptom/pain control, and quality of life, even if it means sacrificing a little time    Accordingly, we have decided that the best plan to meet the patient's goals includes continuing with treatment.      We discussed advance care planning, goals of care, and code status, Full  "Code vs. DNR including risks, benefits, and alternatives. Pt reported she would like to  Continue cancer treatment, symptom management, maintain independence and functional status. 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 Rakesh completed 10/30/2023 reflecting above stated wishes. In case of cardiopulmonary arrest, pt stated, "don't touch me."             Physical Exam:  Vitals:    Physical Exam  Vitals and nursing note reviewed.   Constitutional:       General: She is not in acute distress.     Appearance: Normal appearance. She is normal weight. She is not ill-appearing.   HENT:      Head: Normocephalic and atraumatic.      Nose: Nose normal. No congestion or rhinorrhea.      Mouth/Throat:      Mouth: Mucous membranes are moist.      Pharynx: Oropharynx is clear. No oropharyngeal exudate or posterior oropharyngeal erythema.   Eyes:      General: No scleral icterus.        Right eye: No discharge.         Left eye: No discharge.      Conjunctiva/sclera: Conjunctivae normal.      Pupils: Pupils are equal, round, and reactive to light.   Cardiovascular:      Rate and Rhythm: Normal rate and regular rhythm.      Pulses: Normal pulses.      Heart sounds: Normal heart sounds. No murmur heard.     No gallop.   Pulmonary:      Effort: Pulmonary effort is normal. No respiratory distress.      Breath sounds: Normal breath sounds. No stridor. No wheezing.   Chest:      Chest wall: No tenderness.   Abdominal:      General: Bowel sounds are normal. There is no distension.      Palpations: Abdomen is soft.      Tenderness: There is no abdominal tenderness.   Genitourinary:     Comments: Deferred  Musculoskeletal:         General: Tenderness (T-spine) present. No swelling. Normal range of motion.      Cervical back: Normal range of motion and neck supple.      Right lower leg: No edema.      Left lower leg: No edema.   Skin:     General: Skin is " warm and dry.      Capillary Refill: Capillary refill takes less than 2 seconds.      Coloration: Skin is not jaundiced or pale.      Findings: No rash.   Neurological:      Mental Status: She is alert and oriented to person, place, and time.      Motor: No weakness.   Psychiatric:         Mood and Affect: Mood normal.         Behavior: Behavior normal.         Thought Content: Thought content normal.         Judgment: Judgment normal.       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   Resection was attempted, but found to have metastatic disease to the pleural space with malignant involvement of pleural effusion.   Followed by Dr. Cheney. Previously seen by Dr. Clotilde Fields ([facility])  On Osimertinib daily and Zometa Q4W.  Previously received:  carboplatin, trimetrexate, and Keytruda   PET 10/12/2023: 1. Interval improvement of the left upper lobe mass FDG avidity.  FDG avid prevascular and left suprahilar lymph nodes. 2. Small layering pleural effusion with subtle FDG avid nodularity at the inferior medial aspect suspected. 3. Multiple FDG avid osseous lesions. 4. Other findings as above.   MRI Brain: 8/30/2023: No acute intracranial abnormality. In particular, there is no evidence of any intracranial metastatic disease. No significant change since the previous brain MRI. Mild, age-appropriate cerebral cortical atrophy, predominating frontally and along the temporal tips. Minimal, chronic-appearing, bilateral ethmoid, left frontal, and left sphenoid sinusitis, with minimal rightward bowing of the bony nasal septum.   Pelvic Pressure/Dehydration  Urgent Hemonc visit scheduled for dehydration and possible UTI  Creatinine increasing and GFR trending down 42-->39 12/12/2023  Pt advised to avoid NSAID use due to HTN/CKD hx. Pt verbalized understanding.  Encounter for Palliative Care  -Code status: DNR w/ comfort focused care. LaPOST-10/30/2023  -HCPOA: Sukhi Adrian:  Best Friend: [phone], 2.  Kevin Zambrano: Partner: [phone]. Pt to bring copy to PM clinic  -[facility]:  Continue cancer treatment, symptom management, maintain independence and functional status.   -See HPI for further details     Follow up: PRN      60 minutes total visit  40 minutes of total time spent on the encounter, which includes face to face time and non-face to face time preparing to see the patient (eg, review of tests), Obtaining and/or reviewing separately obtained history, Documenting clinical information in the electronic or other health record, Independently interpreting results (not separately reported) and communicating results to the patient/family/caregiver, or Care coordination (not separately reported).    20 minutes spent in discussing ACP    Signature: GARCÍA COLEMAN NP

## 2023-12-12 NOTE — NURSING
Pt tolerated iv fluids well. IV flushed w/ NS and D/C per protocol.  Pt will contact provider if symptoms continue.

## 2023-12-12 NOTE — PATIENT INSTRUCTIONS
Please bring a copy of your living will and healthcare power of .   We have noted you wish to be DNR with comfort-focused treatment. Your LaPOST is completed reflecting your wishes.  We have scheduled an urgent oncology visit with GERMAN Arroyo to further evaluate your symptoms.   Please contact the palliative care clinic for further concerns.

## 2023-12-12 NOTE — PROGRESS NOTES
Subjective:       Patient ID: JACOBO COLEMAN is a 62 y.o. female.    Chief Complaint: No chief complaint on file.    Primary Oncologist/Hematologist: Dr. Cheney    HPI: Ms. 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 She is currently on osimertinib 80mg daily.     Today: She is being seen today for diarrhea and possible UTI. She denied any fevers or dysuria. She does have some pelvic pressure and have increased in frequency of urination. UA obtained today. She denies any bleeding, sob. She continues to take tagrisso. She states that she had diarrhea, starting on . She was not taking anything for this. She started imodium  and took in a couple of times in the AM as she states her diarrhea is only art that time. She states she has around 5-6 BMs int he AM. She states the imodium has worked but she doesn't take it later int he day for fear of constipation. She is trying to stay hydrated. Her appetite has decreased. She has nausea at times, taking zofran.     Social History     Socioeconomic History    Marital status:    Tobacco Use    Smoking status: Former     Current packs/day: 0.00     Types: Cigarettes     Quit date:      Years since quittin.9    Smokeless tobacco: Never   Substance and Sexual Activity    Alcohol use: Yes     Alcohol/week: 2.0 standard drinks of alcohol     Types: 2 Glasses of wine per week     Comment: socially    Drug use: Not Currently    Sexual activity: Not Currently   Social History Narrative    ** Merged History Encounter **            Past Medical History:   Diagnosis Date    Malignant neoplasm of upper lobe of left lung 2023    Thyroid disease        Family History   Problem Relation Age of Onset    Heart failure Mother     Diabetes Mother     Rheum arthritis Father     Diabetes Brother        Past Surgical History:   Procedure Laterality Date    CHOLECYSTECTOMY       ENDOBRONCHIAL ULTRASOUND Left 1/5/2023    Procedure: ENDOBRONCHIAL ULTRASOUND (EBUS);  Surgeon: Bam Catalan MD;  Location: [facility] ENDO;  Service: Pulmonary;  Laterality: Left;    HYSTERECTOMY      NAVIGATIONAL BRONCHOSCOPY Left 1/5/2023    Procedure: BRONCHOSCOPY, NAVIGATIONAL;  Surgeon: Bam Catalan MD;  Location: [facility] ENDO;  Service: Pulmonary;  Laterality: Left;       Review of Systems   Constitutional:  Negative for activity change, appetite change, chills, diaphoresis, fatigue, fever and unexpected weight change.   HENT:  Negative for congestion, nosebleeds and rhinorrhea.    Respiratory:  Negative for cough and shortness of breath.    Cardiovascular:  Negative for chest pain and leg swelling.   Gastrointestinal:  Positive for diarrhea and nausea. Negative for abdominal pain, anal bleeding, blood in stool, constipation and vomiting.   Genitourinary:  Positive for frequency and pelvic pain. Negative for hematuria.   Skin:  Negative for color change and pallor.   Allergic/Immunologic: Positive for immunocompromised state.   Neurological:  Negative for dizziness, light-headedness and headaches.         Medication List with Changes/Refills   Current Medications    ALBUTEROL (VENTOLIN HFA) 90 MCG/ACTUATION INHALER    Inhale 2 puffs into the lungs every 6 (six) hours as needed for Wheezing. Rescue    AMLODIPINE-OLMESARTAN (GREGORY) 5-20 MG PER TABLET    Take 1 tablet by mouth once daily.    AZELASTINE (ASTELIN) 137 MCG (0.1 %) NASAL SPRAY    SMARTSIG:Both Nares    CETIRIZINE (ZYRTEC) 10 MG TABLET    Take 5 mg by mouth.    CYCLOSPORINE (RESTASIS OPHT)    Place into both eyes 2 (two) times a day.    ESZOPICLONE (LUNESTA) 3 MG TAB    Take 3 mg by mouth nightly as needed.    FLUOXETINE 20 MG CAPSULE    Take 20 mg by mouth.    FLUTICASONE PROPIONATE (FLONASE) 50 MCG/ACTUATION NASAL SPRAY    1 spray by Each Nostril route 2 (two) times daily.    FOLIC ACID (FOLVITE) 1 MG TABLET    Take 1 tablet by mouth every morning.     HYDROCORTISONE 2.5 % LOTION    Apply topically.    HYDROXYZINE HCL (ATARAX) 25 MG TABLET    Take 25 mg by mouth.    IBUPROFEN (ADVIL,MOTRIN) 200 MG TABLET    Take by mouth every 6 (six) hours as needed.    IPRATROPIUM (ATROVENT) 21 MCG (0.03 %) NASAL SPRAY    SMARTSI Spray(s) Both Nares 2-3 Times Daily    LEVOTHYROXINE (SYNTHROID) 88 MCG TABLET    Take 88 mcg by mouth.    METHYLPHENIDATE HCL 18 MG CR TABLET    Take 18 mg by mouth every morning.    MULTIVITAMIN CAPSULE    Take 1 capsule by mouth once daily.    MUPIROCIN (BACTROBAN) 2 % OINTMENT    Apply topically 3 (three) times daily.    OLANZAPINE (ZYPREXA) 5 MG TABLET    Take 5 mg by mouth every evening.    ONDANSETRON (ZOFRAN-ODT) 4 MG TBDL    Take 2 mg by mouth every 6 (six) hours as needed.    OSIMERTINIB (TAGRISSO) 80 MG TAB    Take 1 tab (80 mg) by mouth once daily.    OXYCODONE-ACETAMINOPHEN (PERCOCET) 5-325 MG PER TABLET    Take 1 tablet by mouth 2 (two) times daily as needed.    PANTOPRAZOLE (PROTONIX) 40 MG TABLET    Take 1 tablet by mouth every morning.    PREDNISONE (DELTASONE) 10 MG TABLET    Take 1 tablet (10 mg total) by mouth once daily.    PROMETHAZINE (PHENERGAN) 25 MG TABLET    Take 25 mg by mouth every 6 (six) hours as needed.    TYRVAYA 0.03 MG/SPRAY SPRM    1 spray by Each Nostril route 2 (two) times daily.     Objective:   There were no vitals filed for this visit.    Physical Exam       Labs/Results:  Lab Results   Component Value Date    WBC 3.96 2023    RBC 3.65 (L) 2023    HGB 10.6 (L) 2023    HCT 33.7 (L) 2023    MCV 92 2023    MCH 29.0 2023    MCHC 31.5 (L) 2023    RDW 12.2 2023     2023    MPV 9.1 (L) 2023    GRAN 2.4 2023    GRAN 60.3 2023    LYMPH 0.9 (L) 2023    LYMPH 23.7 2023    MONO 0.5 2023    MONO 11.4 2023    EOS 0.2 2023    BASO 0.01 2023    EOSINOPHIL 4.0 2023    BASOPHIL 0.3 2023      CMP  Sodium   Date Value Ref Range Status   12/12/2023 140 136 - 145 mmol/L Final     Potassium   Date Value Ref Range Status   12/12/2023 4.4 3.5 - 5.1 mmol/L Final     Chloride   Date Value Ref Range Status   12/12/2023 106 95 - 110 mmol/L Final     CO2   Date Value Ref Range Status   12/12/2023 23 23 - 29 mmol/L Final     Glucose   Date Value Ref Range Status   12/12/2023 145 (H) 70 - 110 mg/dL Final     BUN   Date Value Ref Range Status   12/12/2023 15 8 - 23 mg/dL Final     Creatinine   Date Value Ref Range Status   12/12/2023 1.5 (H) 0.5 - 1.4 mg/dL Final     Calcium   Date Value Ref Range Status   12/12/2023 9.0 8.7 - 10.5 mg/dL Final     Total Protein   Date Value Ref Range Status   12/12/2023 7.6 6.0 - 8.4 g/dL Final     Albumin   Date Value Ref Range Status   12/12/2023 3.8 3.5 - 5.2 g/dL Final     Total Bilirubin   Date Value Ref Range Status   12/12/2023 0.2 0.1 - 1.0 mg/dL Final     Comment:     For infants and newborns, interpretation of results should be based  on gestational age, weight and in agreement with clinical  observations.    Premature Infant recommended reference ranges:  Up to 24 hours.............<8.0 mg/dL  Up to 48 hours............<12.0 mg/dL  3-5 days..................<15.0 mg/dL  6-29 days.................<15.0 mg/dL       Alkaline Phosphatase   Date Value Ref Range Status   12/12/2023 94 55 - 135 U/L Final     AST   Date Value Ref Range Status   12/12/2023 59 (H) 10 - 40 U/L Final     ALT   Date Value Ref Range Status   12/12/2023 53 (H) 10 - 44 U/L Final     Anion Gap   Date Value Ref Range Status   12/12/2023 11 8 - 16 mmol/L Final     eGFR   Date Value Ref Range Status   12/12/2023 39 (A) >60 mL/min/1.73 m^2 Final     TSH 0.400 - 4.000 uIU/mL 1.436     Pet scan 10/12/23  Impression:  1. Interval improvement of the left upper lobe mass FDG avidity.  FDG avid prevascular and left suprahilar lymph nodes.  2. Small layering pleural effusion with subtle FDG avid nodularity at the  inferior medial aspect suspected.  3. Multiple FDG avid osseous lesions.  4. Other findings as above.    Assessment:     Problem List Items Addressed This Visit          Immunology/Multi System    Immunodeficiency due to chemotherapy       Oncology    Malignant neoplasm of upper lobe of left lung - Primary    Secondary malignancy of parietal pleura    Secondary malignant neoplasm of bone    EGFR-related lung cancer     Plan:     Malignant neoplasm of upper lobe of left lung, Secondary malignancy of parietal pleura, Secondary malignant neoplasm of bone, EGFR-related lung cancer  --bone scan and CT c/a/p will be done 12/22/23  --then will follow up with Dr. Cheney for review  --continue with osimertinib daily 80mg    Diarrhea  --imodium: followed by 2 mg every 2 to 4 hours or after each loose stool; for diarrhea persisting >24 hours.  --if imodium is not working can switch to lomotil but would like to maximize imodium first.   --continue to stay hydrated  --IVF today    Dysuria  --UA today  --will treat empirically. Can change upon culture return. Will avoid certain abx due to allergies.     Follow-Up: as scheduled    Thelma Emery PA-C  Hematology Oncology

## 2023-12-22 ENCOUNTER — HOSPITAL ENCOUNTER (OUTPATIENT)
Dept: RADIOLOGY | Facility: HOSPITAL | Age: 63
Discharge: HOME | End: 2023-12-22
Attending: INTERNAL MEDICINE
Payer: COMMERCIAL

## 2023-12-22 DIAGNOSIS — C34.12 MALIGNANT NEOPLASM OF UPPER LOBE OF LEFT LUNG: ICD-10-CM

## 2023-12-22 PROCEDURE — 78306 BONE IMAGING WHOLE BODY: CPT | Mod: 26,,, | Performed by: RADIOLOGY

## 2023-12-22 PROCEDURE — 78306 NM BONE SCAN WHOLE BODY: ICD-10-PCS | Mod: 26,,, | Performed by: RADIOLOGY

## 2023-12-22 PROCEDURE — A9698 NON-RAD CONTRAST MATERIALNOC: HCPCS | Performed by: INTERNAL MEDICINE

## 2023-12-22 PROCEDURE — 71260 CT THORAX DX C+: CPT | Mod: 26,,, | Performed by: RADIOLOGY

## 2023-12-22 PROCEDURE — 74177 CT CHEST ABDOMEN PELVIS WITH IV CONTRAST (XPD): ICD-10-PCS | Mod: 26,,, | Performed by: RADIOLOGY

## 2023-12-22 PROCEDURE — 74177 CT ABD & PELVIS W/CONTRAST: CPT | Mod: TC

## 2023-12-22 PROCEDURE — 71260 CT THORAX DX C+: CPT | Mod: TC

## 2023-12-22 PROCEDURE — 25500020 PHARM REV CODE 255: Performed by: INTERNAL MEDICINE

## 2023-12-22 PROCEDURE — A9503 TC99M MEDRONATE: HCPCS

## 2023-12-22 PROCEDURE — 74177 CT ABD & PELVIS W/CONTRAST: CPT | Mod: 26,,, | Performed by: RADIOLOGY

## 2023-12-22 PROCEDURE — 71260 CT CHEST ABDOMEN PELVIS WITH IV CONTRAST (XPD): ICD-10-PCS | Mod: 26,,, | Performed by: RADIOLOGY

## 2023-12-22 RX ADMIN — IOHEXOL 1000 ML: 9 SOLUTION ORAL at 10:12

## 2023-12-22 RX ADMIN — IOHEXOL 100 ML: 350 INJECTION, SOLUTION INTRAVENOUS at 10:12

## 2023-12-28 ENCOUNTER — PATIENT MESSAGE (OUTPATIENT)
Dept: HEMATOLOGY/ONCOLOGY | Facility: CLINIC | Age: 63
End: 2023-12-28

## 2023-12-28 ENCOUNTER — OFFICE VISIT (OUTPATIENT)
Dept: HEMATOLOGY/ONCOLOGY | Facility: CLINIC | Age: 63
End: 2023-12-28
Payer: COMMERCIAL

## 2023-12-28 VITALS
OXYGEN SATURATION: 98 % | TEMPERATURE: 98 F | HEART RATE: 86 BPM | WEIGHT: 177.25 LBS | DIASTOLIC BLOOD PRESSURE: 63 MMHG | BODY MASS INDEX: 25.38 KG/M2 | SYSTOLIC BLOOD PRESSURE: 108 MMHG | HEIGHT: 70 IN

## 2023-12-28 DIAGNOSIS — Z79.899 IMMUNODEFICIENCY DUE TO CHEMOTHERAPY: ICD-10-CM

## 2023-12-28 DIAGNOSIS — T45.1X5A IMMUNODEFICIENCY DUE TO CHEMOTHERAPY: ICD-10-CM

## 2023-12-28 DIAGNOSIS — C78.2 SECONDARY MALIGNANCY OF PARIETAL PLEURA: ICD-10-CM

## 2023-12-28 DIAGNOSIS — C34.90 EGFR-RELATED LUNG CANCER: Primary | ICD-10-CM

## 2023-12-28 DIAGNOSIS — D84.821 IMMUNODEFICIENCY DUE TO CHEMOTHERAPY: ICD-10-CM

## 2023-12-28 DIAGNOSIS — C34.12 MALIGNANT NEOPLASM OF UPPER LOBE OF LEFT LUNG: ICD-10-CM

## 2023-12-28 DIAGNOSIS — C79.51 SECONDARY MALIGNANT NEOPLASM OF BONE: ICD-10-CM

## 2023-12-28 PROCEDURE — 99215 OFFICE O/P EST HI 40 MIN: CPT | Mod: S$GLB,,, | Performed by: INTERNAL MEDICINE

## 2023-12-28 PROCEDURE — 99215 PR OFFICE/OUTPT VISIT, EST, LEVL V, 40-54 MIN: ICD-10-PCS | Mod: S$GLB,,, | Performed by: INTERNAL MEDICINE

## 2023-12-28 PROCEDURE — 1159F PR MEDICATION LIST DOCUMENTED IN MEDICAL RECORD: ICD-10-PCS | Mod: CPTII,S$GLB,, | Performed by: INTERNAL MEDICINE

## 2023-12-28 PROCEDURE — 99999 PR PBB SHADOW E&M-EST. PATIENT-LVL V: ICD-10-PCS | Mod: PBBFAC,,, | Performed by: INTERNAL MEDICINE

## 2023-12-28 PROCEDURE — 3074F SYST BP LT 130 MM HG: CPT | Mod: CPTII,S$GLB,, | Performed by: INTERNAL MEDICINE

## 2023-12-28 PROCEDURE — 3008F BODY MASS INDEX DOCD: CPT | Mod: CPTII,S$GLB,, | Performed by: INTERNAL MEDICINE

## 2023-12-28 PROCEDURE — 4010F ACE/ARB THERAPY RXD/TAKEN: CPT | Mod: CPTII,S$GLB,, | Performed by: INTERNAL MEDICINE

## 2023-12-28 PROCEDURE — 99999 PR PBB SHADOW E&M-EST. PATIENT-LVL V: CPT | Mod: PBBFAC,,, | Performed by: INTERNAL MEDICINE

## 2023-12-28 PROCEDURE — 3044F HG A1C LEVEL LT 7.0%: CPT | Mod: CPTII,S$GLB,, | Performed by: INTERNAL MEDICINE

## 2023-12-28 PROCEDURE — 4010F PR ACE/ARB THEARPY RXD/TAKEN: ICD-10-PCS | Mod: CPTII,S$GLB,, | Performed by: INTERNAL MEDICINE

## 2023-12-28 PROCEDURE — 3044F PR MOST RECENT HEMOGLOBIN A1C LEVEL <7.0%: ICD-10-PCS | Mod: CPTII,S$GLB,, | Performed by: INTERNAL MEDICINE

## 2023-12-28 PROCEDURE — 1160F PR REVIEW ALL MEDS BY PRESCRIBER/CLIN PHARMACIST DOCUMENTED: ICD-10-PCS | Mod: CPTII,S$GLB,, | Performed by: INTERNAL MEDICINE

## 2023-12-28 PROCEDURE — 3074F PR MOST RECENT SYSTOLIC BLOOD PRESSURE < 130 MM HG: ICD-10-PCS | Mod: CPTII,S$GLB,, | Performed by: INTERNAL MEDICINE

## 2023-12-28 PROCEDURE — 3078F PR MOST RECENT DIASTOLIC BLOOD PRESSURE < 80 MM HG: ICD-10-PCS | Mod: CPTII,S$GLB,, | Performed by: INTERNAL MEDICINE

## 2023-12-28 PROCEDURE — 1159F MED LIST DOCD IN RCRD: CPT | Mod: CPTII,S$GLB,, | Performed by: INTERNAL MEDICINE

## 2023-12-28 PROCEDURE — 3008F PR BODY MASS INDEX (BMI) DOCUMENTED: ICD-10-PCS | Mod: CPTII,S$GLB,, | Performed by: INTERNAL MEDICINE

## 2023-12-28 PROCEDURE — 1160F RVW MEDS BY RX/DR IN RCRD: CPT | Mod: CPTII,S$GLB,, | Performed by: INTERNAL MEDICINE

## 2023-12-28 PROCEDURE — 3078F DIAST BP <80 MM HG: CPT | Mod: CPTII,S$GLB,, | Performed by: INTERNAL MEDICINE

## 2023-12-28 NOTE — PROGRESS NOTES
Subjective:       Patient ID: JACOBO COLEMAN is a 63 y.o. female.    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  She states that she does have pain in back where she has had bony metastasis.  Patient has had excellent response.  With dramatic shrinkage in tumor.  ECOG status 1    Past Medical History:   Diagnosis Date    Malignant neoplasm of upper lobe of left lung 2023    Thyroid disease      Family History   Problem Relation Age of Onset    Heart failure Mother     Diabetes Mother     Rheum arthritis Father     Diabetes Brother      Social History     Socioeconomic History    Marital status:    Tobacco Use    Smoking status: Former     Current packs/day: 0.00     Types: Cigarettes     Quit date:      Years since quittin.0     Passive exposure: Never    Smokeless tobacco: Never   Substance and Sexual Activity    Alcohol use: Yes     Alcohol/week: 2.0 standard drinks of alcohol     Types: 2 Glasses of wine per week     Comment: socially    Drug use: Not Currently    Sexual activity: Not Currently   Social History Narrative    ** Merged History Encounter **          Social Determinants of Health     Financial Resource Strain: Low Risk  (2023)    Overall Financial Resource Strain (CARDIA)     Difficulty of Paying Living Expenses: Not hard at all   Food Insecurity: No Food Insecurity (2023)    Hunger Vital Sign     Worried About Running Out of Food in the Last Year: Never true     Ran Out of Food in the Last Year: Never true   Transportation Needs: No Transportation Needs (2023)    PRAPARE - Transportation     Lack of Transportation (Medical): No     Lack of Transportation (Non-Medical): No   Physical Activity: Sufficiently Active  "(12/25/2023)    Exercise Vital Sign     Days of Exercise per Week: 5 days     Minutes of Exercise per Session: 30 min   Stress: No Stress Concern Present (12/25/2023)    [employer] - Occupational Stress Questionnaire     Feeling of Stress : Not at all   Social Connections: Unknown (12/25/2023)    Social Connection and Isolation Panel [NHANES]     Frequency of Communication with Friends and Family: More than three times a week     Frequency of Social Gatherings with Friends and Family: Once a week     Active Member of Clubs or Organizations: Yes     Attends Club or Organization Meetings: More than 4 times per year     Marital Status: Living with partner   Housing Stability: Low Risk  (12/25/2023)    Housing Stability Vital Sign     Unable to Pay for Housing in the Last Year: No     Number of Places Lived in the Last Year: 1     Unstable Housing in the Last Year: No     Past Surgical History:   Procedure Laterality Date    CHOLECYSTECTOMY      ENDOBRONCHIAL ULTRASOUND Left 1/5/2023    Procedure: ENDOBRONCHIAL ULTRASOUND (EBUS);  Surgeon: Bam Catalan MD;  Location: [facility] ENDO;  Service: Pulmonary;  Laterality: Left;    HYSTERECTOMY      NAVIGATIONAL BRONCHOSCOPY Left 1/5/2023    Procedure: BRONCHOSCOPY, NAVIGATIONAL;  Surgeon: Bam Catalan MD;  Location: [facility] ENDO;  Service: Pulmonary;  Laterality: Left;       Labs:  Lab Results   Component Value Date    WBC 3.96 12/12/2023    HGB 10.6 (L) 12/12/2023    HCT 33.7 (L) 12/12/2023    MCV 92 12/12/2023     12/12/2023     BMP  Lab Results   Component Value Date     12/12/2023    K 4.4 12/12/2023     12/12/2023    CO2 23 12/12/2023    BUN 15 12/12/2023    CREATININE 1.5 (H) 12/12/2023    CALCIUM 9.0 12/12/2023    ANIONGAP 11 12/12/2023     Lab Results   Component Value Date    ALT 53 (H) 12/12/2023    AST 59 (H) 12/12/2023    ALKPHOS 94 12/12/2023    BILITOT 0.2 12/12/2023       No results found for: "IRON", "TIBC", " ""FERRITIN", "SATURATEDIRO"  No results found for: "BFTLOAVX97"  No results found for: "FOLATE"  Lab Results   Component Value Date    TSH 1.436 12/12/2023         Review of Systems   Constitutional:  Negative for activity change, appetite change, chills, diaphoresis, fatigue, fever and unexpected weight change.   HENT:  Negative for congestion, dental problem, drooling, ear discharge, ear pain, facial swelling, hearing loss, mouth sores, nosebleeds, postnasal drip, rhinorrhea, sinus pressure, sneezing, sore throat, tinnitus, trouble swallowing and voice change.    Eyes:  Negative for photophobia, pain, discharge, redness, itching and visual disturbance.   Respiratory:  Negative for cough, choking, chest tightness, shortness of breath, wheezing and stridor.    Cardiovascular:  Negative for chest pain, palpitations and leg swelling.   Gastrointestinal:  Negative for abdominal distention, abdominal pain, anal bleeding, blood in stool, constipation, diarrhea, nausea, rectal pain and vomiting.   Endocrine: Negative for cold intolerance, heat intolerance, polydipsia, polyphagia and polyuria.   Genitourinary:  Negative for decreased urine volume, difficulty urinating, dyspareunia, dysuria, enuresis, flank pain, frequency, genital sores, hematuria, menstrual problem, pelvic pain, urgency, vaginal bleeding, vaginal discharge and vaginal pain.   Musculoskeletal:  Positive for back pain. Negative for arthralgias, gait problem, joint swelling, myalgias, neck pain and neck stiffness.   Skin:  Negative for color change, pallor and rash.   Allergic/Immunologic: Negative for environmental allergies, food allergies and immunocompromised state.   Neurological:  Negative for dizziness, tremors, seizures, syncope, facial asymmetry, speech difficulty, weakness, light-headedness, numbness and headaches.   Hematological:  Negative for adenopathy. Does not bruise/bleed easily.   Psychiatric/Behavioral:  Negative for agitation, behavioral " problems, confusion, decreased concentration, dysphoric mood, hallucinations, self-injury, sleep disturbance and suicidal ideas. The patient is not nervous/anxious and is not hyperactive.        Objective:      Physical Exam  Vitals reviewed.   Constitutional:       General: She is not in acute distress.     Appearance: She is well-developed. She is not diaphoretic.   HENT:      Head: Normocephalic and atraumatic.      Right Ear: External ear normal.      Left Ear: External ear normal.      Nose: Nose normal.      Right Sinus: No maxillary sinus tenderness or frontal sinus tenderness.      Left Sinus: No maxillary sinus tenderness or frontal sinus tenderness.      Mouth/Throat:      Pharynx: No oropharyngeal exudate.   Eyes:      General: Lids are normal. No scleral icterus.        Right eye: No discharge.         Left eye: No discharge.      Conjunctiva/sclera: Conjunctivae normal.      Right eye: Right conjunctiva is not injected. No hemorrhage.     Left eye: Left conjunctiva is not injected. No hemorrhage.     Pupils: Pupils are equal, round, and reactive to light.   Neck:      Thyroid: No thyromegaly.      Vascular: No JVD.      Trachea: No tracheal deviation.   Cardiovascular:      Rate and Rhythm: Normal rate.   Pulmonary:      Effort: Pulmonary effort is normal. No respiratory distress.      Breath sounds: No stridor.   Chest:      Chest wall: No tenderness.   Abdominal:      General: Bowel sounds are normal. There is no distension.      Palpations: Abdomen is soft. There is no hepatomegaly, splenomegaly or mass.      Tenderness: There is no abdominal tenderness. There is no rebound.   Musculoskeletal:         General: No tenderness. Normal range of motion.      Cervical back: Normal range of motion and neck supple.   Lymphadenopathy:      Cervical: No cervical adenopathy.      Upper Body:      Right upper body: No supraclavicular adenopathy.      Left upper body: No supraclavicular adenopathy.   Skin:      General: Skin is dry.      Findings: No erythema or rash.   Neurological:      Mental Status: She is alert and oriented to person, place, and time.      Cranial Nerves: No cranial nerve deficit.      Coordination: Coordination normal.   Psychiatric:         Behavior: Behavior normal.         Thought Content: Thought content normal.         Judgment: Judgment normal.             Assessment:      1. EGFR-related lung cancer    2. Malignant neoplasm of upper lobe of left lung    3. Secondary malignancy of parietal pleura    4. Secondary malignant neoplasm of bone    5. Immunodeficiency due to chemotherapy           Med Onc Chart Routing      Follow up with physician . Return to clinic in 2 months to see me with CBC CMP EKG and LDH   Follow up with [facility]    Infusion scheduling note    Injection scheduling note    Labs    Imaging    Pharmacy appointment    Other referrals                   Plan:     Continue with current medication.  Pain in back is most pertinent issue to her I have made referral to pain management and will also make a referral to Radiation Oncology see whether any palliative radiation could be given in these localized areas.  She has had an excellent response but still may have some benefit.  Discussed implications of answered questions with her.  Reviewed images personally        Zenon Cheney Jr, MD FACP

## 2024-01-02 NOTE — PROGRESS NOTES
HISTORY OF PRESENT ILLNESS:  63-year-old woman who underwent chest x-ray on 12/06/2022 for bronchitis symptoms demonstrating a 2.2 cm spiculated lesion in the left upper lobe.    CT-guided biopsy on 01/05/2023 recovered adenocarcinoma from the dominant left upper lobe lesion, PD-L1 5%.    PET scan on 01/03/2023 showed the biopsy-proven left upper lobe lesion to have an SUV of 6.2.  No other nodule showed avidity but may have been too small for adequate uptake.  A single mediastinal node immediately adjacent to the main pulmonary artery had an SUV of 3.1.  There were no other malignant findings    Brain MRI on 01/25/2023 was benign        CT of the chest on 02/01/2023 showed no interval change    At presumed thoracotomy on 02/01/2023, diaphragmatic nodules, parietal pleural nodules, and pericardial nodules were positive for adenocarcinoma.  Left pleural fluid cytology was also positive.    She was initially seen by Dr. Fields and begun on carboplatin, trimetrexate, and Keytruda for 4 cycles, followed by maintenance Keytruda And Alimta    CT of the chest abdomen and pelvis on 05/15/2023 by report showed a mixed response with a left upper lobe primary mass regressing from 3.0 down to 2.4 cm, as well as some of the left lung perifissural nodules, but increased left periaortic/AP window adenopathy    When repeated on 08/24/2023, CTs showed modest further reduction in the size of her left primary mass, stable mediastinal/left hilar adenopathy, and stable to marginally decreased scattered left pleural metastases, with a stable small left pleural effusion.  Several sclerotic metastases in the thoracic spine showed increasing sclerosis suggesting a positive treatment response        Brain MRI on 08/24/2023 remained benign    She sought 2nd opinion with Dr. Cheney on 09/08/2023    [facility] DNA testing on 09/08/2023 showed no reportable pathologic variants.   Somatic potentially actionable EGFR Exon-19 variants were noted.  Osimertinib therapy was initiated on 10/13/2023    PET scan on 10/12/2023 showed improvement in the left upper lobe mass and prevascular/suprahilar adenopathy, presence of a small layering pleural effusion with subtle FDG avid nodularity, and multiple avid bony Mets, including the spine and left adjacent ribs as shown        Bone scan on 12/22/2023 was consistent with a PET scan as shown.  CT of the chest abdomen and pelvis on the same day showed resolution of her mediastinal adenopathy and modest further decrease in the size of the left upper lobe pulmonary nodule.  The left pleural effusion largely resolved, on my review with a small amount of thickening along the posterior wall at mid lung field, and stability of her thoracic spinal sclerotic changes              When seen by Dr. Cheney in routine follow up last week, she complained of progressive back pain where she has known bony metastatic disease.  She was referred to pain management and here to review treatment options    REVIEW OF SYSTEMS:  She reports progressive back pain over many months.  Currently, she wakes up largely pain-free each morning, with progressive pain through the mid afternoon peaking at about 8/10.  She gets some relief if she is able to lay down for 2 or 3 hours.  Her pain was initially well controlled with Advil, after which he was prescribed Lortab without benefit, and more recently Percocet with some relief but extreme sedation.  Her pain location is consistent with bone scan shown above      PAST MEDICAL HISTORY:  Past Medical History:   Diagnosis Date    Malignant neoplasm of upper lobe of left lung 1/19/2023    Thyroid disease        PAST SURGICAL HISTORY:  Past Surgical History:   Procedure Laterality Date    CHOLECYSTECTOMY      ENDOBRONCHIAL ULTRASOUND Left 1/5/2023    Procedure: ENDOBRONCHIAL ULTRASOUND (EBUS);  Surgeon: Bam Catalan MD;  Location: [facility];  Service: Pulmonary;  Laterality: Left;    HYSTERECTOMY       NAVIGATIONAL BRONCHOSCOPY Left 2023    Procedure: BRONCHOSCOPY, NAVIGATIONAL;  Surgeon: Bam Catalan MD;  Location: [facility];  Service: Pulmonary;  Laterality: Left;       ALLERGIES:   Review of patient's allergies indicates:   Allergen Reactions    Xylocaine with epinephrine [lidocaine-epinephrine] Anaphylaxis    Lipitor [atorvastatin] Other (See Comments)     Body aches      Methocarbamol-aspirin     Zetia [ezetimibe] Other (See Comments)     Muscle spasms      Augmentin [amoxicillin-pot clavulanate] Rash    Macrodantin [nitrofurantoin macrocrystal] Rash    Omnicef [cefdinir] Rash and Other (See Comments)     GI upset    Pravastatin Rash    Sulfa (sulfonamide antibiotics) Rash       MEDICATIONS:  Current Outpatient Medications   Medication Sig    albuterol (VENTOLIN HFA) 90 mcg/actuation inhaler Inhale 2 puffs into the lungs every 6 (six) hours as needed for Wheezing. Rescue    amlodipine-olmesartan (GREGORY) 5-20 mg per tablet Take 1 tablet by mouth once daily.    azelastine (ASTELIN) 137 mcg (0.1 %) nasal spray SMARTSIG:Both Nares    cetirizine (ZYRTEC) 10 MG tablet Take 5 mg by mouth.    cyclosporine (RESTASIS OPHT) Place into both eyes 2 (two) times a day.    eszopiclone (LUNESTA) 3 mg Tab Take 3 mg by mouth nightly as needed.    FLUoxetine 20 MG capsule Take 20 mg by mouth.    fluticasone propionate (FLONASE) 50 mcg/actuation nasal spray 1 spray by Each Nostril route 2 (two) times daily.    folic acid (FOLVITE) 1 MG tablet Take 1 tablet by mouth every morning.    hydrocortisone 2.5 % lotion Apply topically.    hydrOXYzine HCL (ATARAX) 25 MG tablet Take 25 mg by mouth.    ibuprofen (ADVIL,MOTRIN) 200 MG tablet Take by mouth every 6 (six) hours as needed.    ipratropium (ATROVENT) 21 mcg (0.03 %) nasal spray SMARTSI Spray(s) Both Nares 2-3 Times Daily    levothyroxine (SYNTHROID) 88 MCG tablet Take 88 mcg by mouth.    methylphenidate HCl 18 MG CR tablet Take 18 mg by mouth every morning.     multivitamin capsule Take 1 capsule by mouth once daily.    mupirocin (BACTROBAN) 2 % ointment Apply topically 3 (three) times daily.    OLANZapine (ZYPREXA) 5 MG tablet Take 5 mg by mouth every evening.    ondansetron (ZOFRAN-ODT) 4 MG TbDL Take 2 mg by mouth every 6 (six) hours as needed.    osimertinib (TAGRISSO) 80 mg Tab Take 1 tab (80 mg) by mouth once daily.    oxyCODONE-acetaminophen (PERCOCET) 5-325 mg per tablet Take 1 tablet by mouth 2 (two) times daily as needed.    pantoprazole (PROTONIX) 40 MG tablet Take 1 tablet by mouth every morning.    predniSONE (DELTASONE) 10 MG tablet Take 1 tablet (10 mg total) by mouth once daily.    promethazine (PHENERGAN) 25 MG tablet Take 25 mg by mouth every 6 (six) hours as needed.    TYRVAYA 0.03 mg/spray sprm 1 spray by Each Nostril route 2 (two) times daily.     No current facility-administered medications for this visit.       SOCIAL HISTORY:  Social History     Socioeconomic History    Marital status:    Tobacco Use    Smoking status: Former     Current packs/day: 0.00     Types: Cigarettes     Quit date:      Years since quittin.0     Passive exposure: Never    Smokeless tobacco: Never   Substance and Sexual Activity    Alcohol use: Yes     Alcohol/week: 2.0 standard drinks of alcohol     Types: 2 Glasses of wine per week     Comment: socially    Drug use: Not Currently    Sexual activity: Not Currently   Social History Narrative    ** Merged History Encounter **          Social Determinants of Health     Financial Resource Strain: Low Risk  (2023)    Overall Financial Resource Strain (CARDIA)     Difficulty of Paying Living Expenses: Not hard at all   Food Insecurity: No Food Insecurity (2023)    Hunger Vital Sign     Worried About Running Out of Food in the Last Year: Never true     Ran Out of Food in the Last Year: Never true   Transportation Needs: No Transportation Needs (2023)    PRAPARE - Transportation     Lack of  Transportation (Medical): No     Lack of Transportation (Non-Medical): No   Physical Activity: Sufficiently Active (12/25/2023)    Exercise Vital Sign     Days of Exercise per Week: 5 days     Minutes of Exercise per Session: 30 min   Stress: No Stress Concern Present (12/25/2023)    [place] [employer] - Occupational Stress Questionnaire     Feeling of Stress : Not at all   Social Connections: Unknown (12/25/2023)    Social Connection and Isolation Panel [NHANES]     Frequency of Communication with Friends and Family: More than three times a week     Frequency of Social Gatherings with Friends and Family: Once a week     Active Member of Clubs or Organizations: Yes     Attends Club or Organization Meetings: More than 4 times per year     Marital Status: Living with partner   Housing Stability: Low Risk  (12/25/2023)    Housing Stability Vital Sign     Unable to Pay for Housing in the Last Year: No     Number of Places Lived in the Last Year: 1     Unstable Housing in the Last Year: No       She is a nurse, currently working in hospice care, accompanied by an attentive friend who is also a nurse    FAMILY HISTORY:  Family History   Problem Relation Age of Onset    Heart failure Mother     Diabetes Mother     Rheum arthritis Father     Diabetes Brother          PHYSICAL EXAMINATION:  Vitals:    01/04/24 1411   BP: 127/78   Pulse: 85   Resp: 18   Temp: 97.7 °F (36.5 °C)     KPS:  80    ASSESSMENT:  1 year history of metastatic non-small-cell lung cancer, now with radiographic and pain progression correlating with her mid to upper thoracic bony spinal disease and adjacent left rib lesions    PLAN:  We had a lengthy discussion regarding the nature of her circumstance and the role of radiotherapy in providing symptomatic relief of painful bony metastatic disease.  We reviewed the logistics of therapy, including the planning and treatment visits, as well as possible acute and chronic side effects in  great detail.  She voiced an understanding and a desire to proceed.  Informed written consent was obtained and she was given the original after scanning into the EMR    She will return in the next few days for immobilization and CT simulation and begin treatment about 1 week later.  I anticipate treating all her gross disease in the region of pain to 20 or 25 in 5, or 30 in 10, as allowed following DVH analysis    I spent approximately 60 minutes reviewing the available records and evaluating the patient, out of which over 50% of the time was spent face to face with the patient in counseling and coordinating this patient's care.

## 2024-01-04 ENCOUNTER — DOCUMENTATION ONLY (OUTPATIENT)
Dept: HEMATOLOGY/ONCOLOGY | Facility: CLINIC | Age: 64
End: 2024-01-04
Payer: COMMERCIAL

## 2024-01-04 ENCOUNTER — OFFICE VISIT (OUTPATIENT)
Dept: RADIATION ONCOLOGY | Facility: CLINIC | Age: 64
End: 2024-01-04
Payer: COMMERCIAL

## 2024-01-04 VITALS
WEIGHT: 178.56 LBS | DIASTOLIC BLOOD PRESSURE: 78 MMHG | RESPIRATION RATE: 18 BRPM | HEIGHT: 70 IN | BODY MASS INDEX: 25.56 KG/M2 | OXYGEN SATURATION: 97 % | TEMPERATURE: 98 F | SYSTOLIC BLOOD PRESSURE: 127 MMHG | HEART RATE: 85 BPM

## 2024-01-04 DIAGNOSIS — C79.51 SECONDARY MALIGNANT NEOPLASM OF BONE: ICD-10-CM

## 2024-01-04 DIAGNOSIS — C34.90 EGFR-RELATED LUNG CANCER: ICD-10-CM

## 2024-01-04 PROCEDURE — 3074F SYST BP LT 130 MM HG: CPT | Mod: CPTII,S$GLB,, | Performed by: SPECIALIST

## 2024-01-04 PROCEDURE — 99205 OFFICE O/P NEW HI 60 MIN: CPT | Mod: S$GLB,,, | Performed by: SPECIALIST

## 2024-01-04 PROCEDURE — 1159F MED LIST DOCD IN RCRD: CPT | Mod: CPTII,S$GLB,, | Performed by: SPECIALIST

## 2024-01-04 PROCEDURE — 99999 PR PBB SHADOW E&M-EST. PATIENT-LVL V: CPT | Mod: PBBFAC,,, | Performed by: SPECIALIST

## 2024-01-04 PROCEDURE — 3008F BODY MASS INDEX DOCD: CPT | Mod: CPTII,S$GLB,, | Performed by: SPECIALIST

## 2024-01-04 PROCEDURE — 3078F DIAST BP <80 MM HG: CPT | Mod: CPTII,S$GLB,, | Performed by: SPECIALIST

## 2024-01-04 NOTE — PROGRESS NOTES
"Nelsonr met with pt following today's [facility] appt. Swer notified of pt.'s distress score from 12/28/23. Pt reports she is doing "amazingly well" and denies need for sw services. She confirms having spoken to SS and has the information she needs for when/if she decides to not work anymore. Pt also reports she has a cancer policy she is needing to sort out. Swer provided pt with 's business card for pt to make an appt when pt is ready to submit claims. Swer also provided pt with sw business card for any needs that may arise. Swer will remain available.         1/4/2024     2:15 PM 12/28/2023    10:58 AM 12/25/2023     1:24 PM 12/12/2023    10:47 AM 10/27/2023     6:29 PM 10/13/2023    12:54 PM 10/5/2023    12:57 PM   DISTRESS SCREENING   Distress Score 0 - No Distress 4 3 2 1 0 - No Distress 0 - No Distress   Practical Concerns None of these  Work None of these None of these None of these None of these   Social Concerns None of these  None of these None of these None of these None of these None of these   Emotional Concerns None of these Sadness or depression;Worry or anxiety None of these None of these None of these None of these None of these   Retire Spiritual or Rastafari Concerns    No No No No   Spiritual or Rastafari Concerns None of these  None of these       Physical Concerns None of these Pain;Sleep;Fatigue Pain;Fatigue Pain Pain;Skin Dry/Itchy Nausea None of these   Other Problems   Vomiting, diarrhea,  foliculitis  Migraines, got started on BP meds by PCP          "

## 2024-01-05 ENCOUNTER — PATIENT MESSAGE (OUTPATIENT)
Dept: ADMINISTRATIVE | Facility: OTHER | Age: 64
End: 2024-01-05
Payer: COMMERCIAL

## 2024-01-08 ENCOUNTER — HOSPITAL ENCOUNTER (OUTPATIENT)
Dept: RADIOLOGY | Facility: HOSPITAL | Age: 64
Discharge: HOME | End: 2024-01-08
Attending: INTERNAL MEDICINE
Payer: COMMERCIAL

## 2024-01-08 ENCOUNTER — HOSPITAL ENCOUNTER (OUTPATIENT)
Dept: RADIATION THERAPY | Facility: HOSPITAL | Age: 64
Discharge: HOME | End: 2024-01-08
Attending: INTERNAL MEDICINE
Payer: COMMERCIAL

## 2024-01-08 PROCEDURE — 77263 THER RADIOLOGY TX PLNG CPLX: CPT | Mod: ,,, | Performed by: SPECIALIST

## 2024-01-08 PROCEDURE — 77290 THER RAD SIMULAJ FIELD CPLX: CPT | Mod: TC | Performed by: SPECIALIST

## 2024-01-08 PROCEDURE — 77334 RADIATION TREATMENT AID(S): CPT | Mod: 26,,, | Performed by: SPECIALIST

## 2024-01-08 PROCEDURE — 77334 RADIATION TREATMENT AID(S): CPT | Mod: TC | Performed by: SPECIALIST

## 2024-01-08 PROCEDURE — 77014 HC CT GUIDANCE RADIATION THERAPY FLDS PLACEMENT: CPT | Mod: TC | Performed by: SPECIALIST

## 2024-01-08 PROCEDURE — 77290 THER RAD SIMULAJ FIELD CPLX: CPT | Mod: 26,,, | Performed by: SPECIALIST

## 2024-01-09 PROCEDURE — 77300 RADIATION THERAPY DOSE PLAN: CPT | Mod: 26,,, | Performed by: SPECIALIST

## 2024-01-09 PROCEDURE — 77334 RADIATION TREATMENT AID(S): CPT | Mod: 26,,, | Performed by: SPECIALIST

## 2024-01-09 PROCEDURE — 77334 RADIATION TREATMENT AID(S): CPT | Mod: TC | Performed by: SPECIALIST

## 2024-01-09 PROCEDURE — 77300 RADIATION THERAPY DOSE PLAN: CPT | Mod: TC | Performed by: SPECIALIST

## 2024-01-09 PROCEDURE — 77295 3-D RADIOTHERAPY PLAN: CPT | Mod: 26,,, | Performed by: SPECIALIST

## 2024-01-09 PROCEDURE — 77295 3-D RADIOTHERAPY PLAN: CPT | Mod: TC | Performed by: SPECIALIST

## 2024-01-10 ENCOUNTER — DOCUMENTATION ONLY (OUTPATIENT)
Dept: RADIATION ONCOLOGY | Facility: CLINIC | Age: 64
End: 2024-01-10
Payer: COMMERCIAL

## 2024-01-10 PROCEDURE — 77387 GUIDANCE FOR RADJ TX DLVR: CPT | Mod: 26,,, | Performed by: SPECIALIST

## 2024-01-10 PROCEDURE — 77387 GUIDANCE FOR RADJ TX DLVR: CPT | Mod: TC | Performed by: SPECIALIST

## 2024-01-10 PROCEDURE — 77427 RADIATION TX MANAGEMENT X5: CPT | Mod: ,,, | Performed by: SPECIALIST

## 2024-01-10 PROCEDURE — 77417 THER RADIOLOGY PORT IMAGE(S): CPT | Performed by: SPECIALIST

## 2024-01-10 PROCEDURE — 77412 RADIATION TX DELIVERY LVL 3: CPT | Performed by: SPECIALIST

## 2024-01-10 NOTE — PROGRESS NOTES
Day 1 outpatient xrt to the spine. Short term side effects handout and verbal instructions given. Skin care and side effects reviewed. Contact info provided. Patient verbalized understanding.

## 2024-01-11 PROCEDURE — 77412 RADIATION TX DELIVERY LVL 3: CPT | Performed by: SPECIALIST

## 2024-01-11 PROCEDURE — 77387 GUIDANCE FOR RADJ TX DLVR: CPT | Mod: 26,,, | Performed by: SPECIALIST

## 2024-01-11 PROCEDURE — 77387 GUIDANCE FOR RADJ TX DLVR: CPT | Mod: TC | Performed by: SPECIALIST

## 2024-01-16 ENCOUNTER — DOCUMENTATION ONLY (OUTPATIENT)
Dept: RADIATION ONCOLOGY | Facility: CLINIC | Age: 64
End: 2024-01-16
Payer: COMMERCIAL

## 2024-01-16 ENCOUNTER — TELEPHONE (OUTPATIENT)
Dept: HEMATOLOGY/ONCOLOGY | Facility: CLINIC | Age: 64
End: 2024-01-16
Payer: COMMERCIAL

## 2024-01-16 PROCEDURE — 77387 GUIDANCE FOR RADJ TX DLVR: CPT | Mod: TC | Performed by: SPECIALIST

## 2024-01-16 PROCEDURE — 77387 GUIDANCE FOR RADJ TX DLVR: CPT | Mod: 26,,, | Performed by: SPECIALIST

## 2024-01-16 PROCEDURE — 77412 RADIATION TX DELIVERY LVL 3: CPT | Performed by: SPECIALIST

## 2024-01-16 NOTE — TELEPHONE ENCOUNTER
Attempted to contact patient to explain that he needs to be seen in the ER. No answer, left voicemail and message send to patient via portal.

## 2024-01-16 NOTE — PROGRESS NOTES
Day 3 outpatient xrt to the spine. Tolerating therapy well, no complaints. Will continue to monitor.

## 2024-01-17 ENCOUNTER — TELEPHONE (OUTPATIENT)
Dept: HEMATOLOGY/ONCOLOGY | Facility: CLINIC | Age: 64
End: 2024-01-17
Payer: COMMERCIAL

## 2024-01-17 PROCEDURE — 77412 RADIATION TX DELIVERY LVL 3: CPT | Performed by: SPECIALIST

## 2024-01-17 PROCEDURE — 77387 GUIDANCE FOR RADJ TX DLVR: CPT | Mod: 26,,, | Performed by: SPECIALIST

## 2024-01-17 PROCEDURE — 77387 GUIDANCE FOR RADJ TX DLVR: CPT | Mod: TC | Performed by: SPECIALIST

## 2024-01-17 NOTE — TELEPHONE ENCOUNTER
Returned patient phone call. Patient stated that she was going see her pcp because she don't feel she need to be seen in the ER. Informed her ER was MD recommendations to rule out sepsis. Patient verbalized understanding and had no other issues at this time.

## 2024-01-17 NOTE — TELEPHONE ENCOUNTER
----- Message from Renita Trejo sent at 1/16/2024  4:58 PM CST -----  Regarding: missed call  Name of who is calling:   Erma      What is the request in detail: Pt is requesting a call back in ref to a missed ll from Alissa Reed the clinic reply by [facility]:no      What number to call back if not [facility]: [phone]

## 2024-01-18 ENCOUNTER — DOCUMENTATION ONLY (OUTPATIENT)
Dept: RADIATION ONCOLOGY | Facility: CLINIC | Age: 64
End: 2024-01-18
Payer: COMMERCIAL

## 2024-01-18 PROCEDURE — 77387 GUIDANCE FOR RADJ TX DLVR: CPT | Mod: 26,,, | Performed by: SPECIALIST

## 2024-01-18 PROCEDURE — 77412 RADIATION TX DELIVERY LVL 3: CPT | Performed by: SPECIALIST

## 2024-01-18 PROCEDURE — 77387 GUIDANCE FOR RADJ TX DLVR: CPT | Mod: TC | Performed by: SPECIALIST

## 2024-01-18 PROCEDURE — 77336 RADIATION PHYSICS CONSULT: CPT | Performed by: SPECIALIST

## 2024-01-29 ENCOUNTER — PATIENT MESSAGE (OUTPATIENT)
Dept: RADIATION ONCOLOGY | Facility: CLINIC | Age: 64
End: 2024-01-29
Payer: COMMERCIAL

## 2024-02-04 ENCOUNTER — PATIENT MESSAGE (OUTPATIENT)
Dept: ADMINISTRATIVE | Facility: OTHER | Age: 64
End: 2024-02-04
Payer: COMMERCIAL

## 2024-02-19 NOTE — PROGRESS NOTES
"[facility] - Radiation Oncology Follow Up Note    HISTORY OF PRESENT ILLNESS:  C79.51 - Secondary malignant neoplasm of bone, Diagnosed 1/4/2024 (Active)     1 year history of metastatic non-small-cell lung cancer, now with radiographic and pain progression correlating with her mid to upper thoracic bony spinal disease and adjacent left rib lesions     Treatment Summary: She was treated to T6-T9 vertebra along with the adjacent paravertebral left rib lesions using opposed HOBBS LPO 18 mV photon treatment fields along with a 18 mV 3D conformal arc, delivering 22.5 Gy in 5 4.5 Gy fractions last on 01/18/2024       INTERVAL HISTORY:  She returns for routine 1 month follow-up.  She had tolerated therapy well and reported overwhelming improvement in her pain following the 3rd fraction.  Today, she reports persistent complete resolution of her presenting pain.  She did go on to develop a transient odynophagia for which he took Pepcid for 1 day with prompt resolution.  Currently without radiation side effects   PHYSICAL EXAMINATION:  Vitals:    02/20/24 1055   BP: 110/74   Pulse: 94   Temp: 98 °F (36.7 °C)   TempSrc: Temporal   SpO2: 98%   Weight: 79.4 kg (175 lb 0.7 oz)   Height: 5' 10" (1.778 m)      General:  A&O x4, NAD  Lungs:  CTAB  Heart:  RRR    ASSESSMENT:  Complete clinical response, no radiation side effects      PLAN:  Follow up in 6 months.  She returns to Dr. Cheney clinic next week        "

## 2024-02-20 ENCOUNTER — OFFICE VISIT (OUTPATIENT)
Dept: RADIATION ONCOLOGY | Facility: CLINIC | Age: 64
End: 2024-02-20
Payer: COMMERCIAL

## 2024-02-20 VITALS
BODY MASS INDEX: 25.06 KG/M2 | OXYGEN SATURATION: 98 % | DIASTOLIC BLOOD PRESSURE: 74 MMHG | TEMPERATURE: 98 F | HEIGHT: 70 IN | SYSTOLIC BLOOD PRESSURE: 110 MMHG | HEART RATE: 94 BPM | WEIGHT: 175.06 LBS

## 2024-02-20 DIAGNOSIS — C79.51 SECONDARY MALIGNANT NEOPLASM OF BONE: ICD-10-CM

## 2024-02-20 DIAGNOSIS — C34.90 EGFR-RELATED LUNG CANCER: Primary | ICD-10-CM

## 2024-02-20 PROCEDURE — 3074F SYST BP LT 130 MM HG: CPT | Mod: CPTII,S$GLB,, | Performed by: SPECIALIST

## 2024-02-20 PROCEDURE — 3008F BODY MASS INDEX DOCD: CPT | Mod: CPTII,S$GLB,, | Performed by: SPECIALIST

## 2024-02-20 PROCEDURE — 3078F DIAST BP <80 MM HG: CPT | Mod: CPTII,S$GLB,, | Performed by: SPECIALIST

## 2024-02-20 PROCEDURE — 1159F MED LIST DOCD IN RCRD: CPT | Mod: CPTII,S$GLB,, | Performed by: SPECIALIST

## 2024-02-20 PROCEDURE — 4010F ACE/ARB THERAPY RXD/TAKEN: CPT | Mod: CPTII,S$GLB,, | Performed by: SPECIALIST

## 2024-02-20 PROCEDURE — 99999 PR PBB SHADOW E&M-EST. PATIENT-LVL IV: CPT | Mod: PBBFAC,,, | Performed by: SPECIALIST

## 2024-02-20 PROCEDURE — 99499 UNLISTED E&M SERVICE: CPT | Mod: S$GLB,,, | Performed by: SPECIALIST

## 2024-02-28 ENCOUNTER — HOSPITAL ENCOUNTER (OUTPATIENT)
Dept: RADIOLOGY | Facility: HOSPITAL | Age: 64
Discharge: HOME | DRG: 194 | End: 2024-02-28
Attending: INTERNAL MEDICINE
Payer: COMMERCIAL

## 2024-02-28 ENCOUNTER — HOSPITAL ENCOUNTER (INPATIENT)
Facility: HOSPITAL | Age: 64
LOS: 2 days | Discharge: HOME | DRG: 194 | End: 2024-03-01
Attending: EMERGENCY MEDICINE | Admitting: FAMILY MEDICINE
Payer: COMMERCIAL

## 2024-02-28 ENCOUNTER — HOSPITAL ENCOUNTER (OUTPATIENT)
Dept: CARDIOLOGY | Facility: HOSPITAL | Age: 64
Discharge: HOME | DRG: 194 | End: 2024-02-28
Attending: INTERNAL MEDICINE
Payer: COMMERCIAL

## 2024-02-28 ENCOUNTER — OFFICE VISIT (OUTPATIENT)
Dept: HEMATOLOGY/ONCOLOGY | Facility: CLINIC | Age: 64
End: 2024-02-28
Attending: INTERNAL MEDICINE
Payer: COMMERCIAL

## 2024-02-28 VITALS
HEART RATE: 104 BPM | TEMPERATURE: 98 F | SYSTOLIC BLOOD PRESSURE: 137 MMHG | OXYGEN SATURATION: 96 % | WEIGHT: 173.06 LBS | BODY MASS INDEX: 24.78 KG/M2 | DIASTOLIC BLOOD PRESSURE: 77 MMHG | RESPIRATION RATE: 16 BRPM | HEIGHT: 70 IN

## 2024-02-28 DIAGNOSIS — D84.821 IMMUNODEFICIENCY DUE TO CHEMOTHERAPY: ICD-10-CM

## 2024-02-28 DIAGNOSIS — C34.90 EGFR-RELATED LUNG CANCER: ICD-10-CM

## 2024-02-28 DIAGNOSIS — R07.9 ACUTE CHEST PAIN: ICD-10-CM

## 2024-02-28 DIAGNOSIS — J18.9 PNEUMONIA OF BOTH LOWER LOBES DUE TO INFECTIOUS ORGANISM: Primary | ICD-10-CM

## 2024-02-28 DIAGNOSIS — T45.1X5A IMMUNODEFICIENCY DUE TO CHEMOTHERAPY: ICD-10-CM

## 2024-02-28 DIAGNOSIS — C34.12 MALIGNANT NEOPLASM OF UPPER LOBE OF LEFT LUNG: Primary | ICD-10-CM

## 2024-02-28 DIAGNOSIS — A41.9 SEPSIS: ICD-10-CM

## 2024-02-28 DIAGNOSIS — Z85.118 HISTORY OF LUNG CANCER: ICD-10-CM

## 2024-02-28 DIAGNOSIS — Z79.899 IMMUNOCOMPROMISED STATE DUE TO DRUG THERAPY: ICD-10-CM

## 2024-02-28 DIAGNOSIS — D84.821 IMMUNOCOMPROMISED STATE DUE TO DRUG THERAPY: ICD-10-CM

## 2024-02-28 DIAGNOSIS — Z79.899 IMMUNODEFICIENCY DUE TO CHEMOTHERAPY: ICD-10-CM

## 2024-02-28 LAB
ADENOVIRUS: NOT DETECTED
ALBUMIN SERPL BCP-MCNC: 3.1 G/DL (ref 3.5–5.2)
ALP SERPL-CCNC: 91 U/L (ref 55–135)
ALT SERPL W/O P-5'-P-CCNC: 35 U/L (ref 10–44)
ANION GAP SERPL CALC-SCNC: 11 MMOL/L (ref 8–16)
AST SERPL-CCNC: 37 U/L (ref 10–40)
BASOPHILS # BLD AUTO: 0.03 K/UL (ref 0–0.2)
BASOPHILS NFR BLD: 0.4 % (ref 0–1.9)
BILIRUB SERPL-MCNC: 0.2 MG/DL (ref 0.1–1)
BILIRUB UR QL STRIP: NEGATIVE
BNP SERPL-MCNC: 28 PG/ML (ref 0–99)
BORDETELLA PARAPERTUSSIS (IS1001): NOT DETECTED
BORDETELLA PERTUSSIS (PTXP): NOT DETECTED
BUN SERPL-MCNC: 21 MG/DL (ref 8–23)
CALCIUM SERPL-MCNC: 9.1 MG/DL (ref 8.7–10.5)
CHLAMYDIA PNEUMONIAE: NOT DETECTED
CHLORIDE SERPL-SCNC: 107 MMOL/L (ref 95–110)
CLARITY UR: CLEAR
CO2 SERPL-SCNC: 18 MMOL/L (ref 23–29)
COLOR UR: YELLOW
CORONAVIRUS 229E, COMMON COLD VIRUS: NOT DETECTED
CORONAVIRUS HKU1, COMMON COLD VIRUS: NOT DETECTED
CORONAVIRUS NL63, COMMON COLD VIRUS: NOT DETECTED
CORONAVIRUS OC43, COMMON COLD VIRUS: NOT DETECTED
CREAT SERPL-MCNC: 1.3 MG/DL (ref 0.5–1.4)
DIFFERENTIAL METHOD BLD: ABNORMAL
EOSINOPHIL # BLD AUTO: 0 K/UL (ref 0–0.5)
EOSINOPHIL NFR BLD: 0.3 % (ref 0–8)
ERYTHROCYTE [DISTWIDTH] IN BLOOD BY AUTOMATED COUNT: 13.1 % (ref 11.5–14.5)
EST. GFR  (NO RACE VARIABLE): 46 ML/MIN/1.73 M^2
FLUBV RNA NPH QL NAA+NON-PROBE: NOT DETECTED
GLUCOSE SERPL-MCNC: 146 MG/DL (ref 70–110)
GLUCOSE UR QL STRIP: NEGATIVE
HCT VFR BLD AUTO: 30.3 % (ref 37–48.5)
HGB BLD-MCNC: 9.6 G/DL (ref 12–16)
HGB UR QL STRIP: NEGATIVE
HPIV1 RNA NPH QL NAA+NON-PROBE: NOT DETECTED
HPIV2 RNA NPH QL NAA+NON-PROBE: NOT DETECTED
HPIV3 RNA NPH QL NAA+NON-PROBE: NOT DETECTED
HPIV4 RNA NPH QL NAA+NON-PROBE: NOT DETECTED
HUMAN METAPNEUMOVIRUS: NOT DETECTED
IMM GRANULOCYTES # BLD AUTO: 0.06 K/UL (ref 0–0.04)
IMM GRANULOCYTES NFR BLD AUTO: 0.8 % (ref 0–0.5)
INFLUENZA A (SUBTYPES H1,H1-2009,H3): NOT DETECTED
INFLUENZA A, MOLECULAR: NEGATIVE
INFLUENZA B, MOLECULAR: NEGATIVE
KETONES UR QL STRIP: NEGATIVE
LACTATE SERPL-SCNC: 1.7 MMOL/L (ref 0.5–2.2)
LACTATE SERPL-SCNC: 1.9 MMOL/L (ref 0.5–2.2)
LEUKOCYTE ESTERASE UR QL STRIP: ABNORMAL
LYMPHOCYTES # BLD AUTO: 0.4 K/UL (ref 1–4.8)
LYMPHOCYTES NFR BLD: 4.8 % (ref 18–48)
MAGNESIUM SERPL-MCNC: 1.8 MG/DL (ref 1.6–2.6)
MCH RBC QN AUTO: 26.4 PG (ref 27–31)
MCHC RBC AUTO-ENTMCNC: 31.7 G/DL (ref 32–36)
MCV RBC AUTO: 84 FL (ref 82–98)
MICROSCOPIC COMMENT: ABNORMAL
MONOCYTES # BLD AUTO: 0.3 K/UL (ref 0.3–1)
MONOCYTES NFR BLD: 4.2 % (ref 4–15)
MYCOPLASMA PNEUMONIAE: NOT DETECTED
NEUTROPHILS # BLD AUTO: 6.6 K/UL (ref 1.8–7.7)
NEUTROPHILS NFR BLD: 89.5 % (ref 38–73)
NITRITE UR QL STRIP: NEGATIVE
NRBC BLD-RTO: 0 /100 WBC
OHS QRS DURATION: 76 MS
OHS QRS DURATION: 78 MS
OHS QTC CALCULATION: 457 MS
OHS QTC CALCULATION: 472 MS
PH UR STRIP: 6 [PH] (ref 5–8)
PLATELET # BLD AUTO: 411 K/UL (ref 150–450)
PMV BLD AUTO: 9.2 FL (ref 9.2–12.9)
POCT GLUCOSE: 118 MG/DL (ref 70–110)
POCT GLUCOSE: 192 MG/DL (ref 70–110)
POTASSIUM SERPL-SCNC: 4.5 MMOL/L (ref 3.5–5.1)
PROCALCITONIN SERPL IA-MCNC: 0.06 NG/ML
PROT SERPL-MCNC: 7.4 G/DL (ref 6–8.4)
PROT UR QL STRIP: ABNORMAL
RBC # BLD AUTO: 3.63 M/UL (ref 4–5.4)
RBC #/AREA URNS HPF: 4 /HPF (ref 0–4)
RESPIRATORY INFECTION PANEL SOURCE: NORMAL
RSV RNA NPH QL NAA+NON-PROBE: NOT DETECTED
RV+EV RNA NPH QL NAA+NON-PROBE: NOT DETECTED
SARS-COV-2 RDRP RESP QL NAA+PROBE: NEGATIVE
SARS-COV-2 RNA RESP QL NAA+PROBE: NOT DETECTED
SODIUM SERPL-SCNC: 136 MMOL/L (ref 136–145)
SP GR UR STRIP: >1.03 (ref 1–1.03)
SPECIMEN SOURCE: NORMAL
TROPONIN I SERPL DL<=0.01 NG/ML-MCNC: <0.006 NG/ML (ref 0–0.03)
URN SPEC COLLECT METH UR: ABNORMAL
UROBILINOGEN UR STRIP-ACNC: NEGATIVE EU/DL
WBC # BLD AUTO: 7.36 K/UL (ref 3.9–12.7)
WBC #/AREA URNS HPF: 6 /HPF (ref 0–5)

## 2024-02-28 PROCEDURE — 36415 COLL VENOUS BLD VENIPUNCTURE: CPT | Mod: XB | Performed by: NURSE PRACTITIONER

## 2024-02-28 PROCEDURE — 83880 ASSAY OF NATRIURETIC PEPTIDE: CPT | Performed by: NURSE PRACTITIONER

## 2024-02-28 PROCEDURE — 1160F RVW MEDS BY RX/DR IN RCRD: CPT | Mod: CPTII,S$GLB,, | Performed by: INTERNAL MEDICINE

## 2024-02-28 PROCEDURE — 83735 ASSAY OF MAGNESIUM: CPT | Performed by: NURSE PRACTITIONER

## 2024-02-28 PROCEDURE — 99215 OFFICE O/P EST HI 40 MIN: CPT | Mod: S$GLB,,, | Performed by: INTERNAL MEDICINE

## 2024-02-28 PROCEDURE — 63600175 PHARM REV CODE 636 W HCPCS: Performed by: EMERGENCY MEDICINE

## 2024-02-28 PROCEDURE — 99999 PR PBB SHADOW E&M-EST. PATIENT-LVL V: CPT | Mod: PBBFAC,,, | Performed by: INTERNAL MEDICINE

## 2024-02-28 PROCEDURE — 83605 ASSAY OF LACTIC ACID: CPT | Performed by: NURSE PRACTITIONER

## 2024-02-28 PROCEDURE — 21400001 HC TELEMETRY ROOM

## 2024-02-28 PROCEDURE — 71275 CT ANGIOGRAPHY CHEST: CPT | Mod: TC

## 2024-02-28 PROCEDURE — 93005 ELECTROCARDIOGRAM TRACING: CPT

## 2024-02-28 PROCEDURE — 80053 COMPREHEN METABOLIC PANEL: CPT | Performed by: NURSE PRACTITIONER

## 2024-02-28 PROCEDURE — U0002 COVID-19 LAB TEST NON-CDC: HCPCS | Performed by: NURSE PRACTITIONER

## 2024-02-28 PROCEDURE — 83036 HEMOGLOBIN GLYCOSYLATED A1C: CPT | Performed by: FAMILY MEDICINE

## 2024-02-28 PROCEDURE — 87040 BLOOD CULTURE FOR BACTERIA: CPT | Mod: 59 | Performed by: NURSE PRACTITIONER

## 2024-02-28 PROCEDURE — 84484 ASSAY OF TROPONIN QUANT: CPT | Performed by: NURSE PRACTITIONER

## 2024-02-28 PROCEDURE — 85025 COMPLETE CBC W/AUTO DIFF WBC: CPT | Performed by: NURSE PRACTITIONER

## 2024-02-28 PROCEDURE — 87502 INFLUENZA DNA AMP PROBE: CPT | Performed by: NURSE PRACTITIONER

## 2024-02-28 PROCEDURE — 99285 EMERGENCY DEPT VISIT HI MDM: CPT | Mod: 25

## 2024-02-28 PROCEDURE — 87070 CULTURE OTHR SPECIMN AEROBIC: CPT | Performed by: EMERGENCY MEDICINE

## 2024-02-28 PROCEDURE — 1159F MED LIST DOCD IN RCRD: CPT | Mod: CPTII,S$GLB,, | Performed by: INTERNAL MEDICINE

## 2024-02-28 PROCEDURE — 25000003 PHARM REV CODE 250: Performed by: FAMILY MEDICINE

## 2024-02-28 PROCEDURE — 4010F ACE/ARB THERAPY RXD/TAKEN: CPT | Mod: CPTII,S$GLB,, | Performed by: INTERNAL MEDICINE

## 2024-02-28 PROCEDURE — 84145 PROCALCITONIN (PCT): CPT | Performed by: NURSE PRACTITIONER

## 2024-02-28 PROCEDURE — 25500020 PHARM REV CODE 255: Performed by: INTERNAL MEDICINE

## 2024-02-28 PROCEDURE — 94761 N-INVAS EAR/PLS OXIMETRY MLT: CPT

## 2024-02-28 PROCEDURE — 3078F DIAST BP <80 MM HG: CPT | Mod: CPTII,S$GLB,, | Performed by: INTERNAL MEDICINE

## 2024-02-28 PROCEDURE — 3008F BODY MASS INDEX DOCD: CPT | Mod: CPTII,S$GLB,, | Performed by: INTERNAL MEDICINE

## 2024-02-28 PROCEDURE — 93010 ELECTROCARDIOGRAM REPORT: CPT | Mod: ,,, | Performed by: INTERNAL MEDICINE

## 2024-02-28 PROCEDURE — 87798 DETECT AGENT NOS DNA AMP: CPT | Performed by: FAMILY MEDICINE

## 2024-02-28 PROCEDURE — 3075F SYST BP GE 130 - 139MM HG: CPT | Mod: CPTII,S$GLB,, | Performed by: INTERNAL MEDICINE

## 2024-02-28 PROCEDURE — 94640 AIRWAY INHALATION TREATMENT: CPT

## 2024-02-28 PROCEDURE — 87205 SMEAR GRAM STAIN: CPT | Performed by: EMERGENCY MEDICINE

## 2024-02-28 PROCEDURE — 81000 URINALYSIS NONAUTO W/SCOPE: CPT | Performed by: NURSE PRACTITIONER

## 2024-02-28 PROCEDURE — 71275 CT ANGIOGRAPHY CHEST: CPT | Mod: 26,,, | Performed by: RADIOLOGY

## 2024-02-28 PROCEDURE — 25000242 PHARM REV CODE 250 ALT 637 W/ HCPCS: Performed by: HOSPITALIST

## 2024-02-28 RX ORDER — LEVOFLOXACIN 5 MG/ML
750 INJECTION, SOLUTION INTRAVENOUS
Status: DISCONTINUED | OUTPATIENT
Start: 2024-02-28 | End: 2024-02-28

## 2024-02-28 RX ORDER — PREDNISONE 10 MG/1
10 TABLET ORAL DAILY
Status: DISCONTINUED | OUTPATIENT
Start: 2024-02-29 | End: 2024-03-01 | Stop reason: HOSPADM

## 2024-02-28 RX ORDER — PROMETHAZINE HYDROCHLORIDE AND CODEINE PHOSPHATE 6.25; 1 MG/5ML; MG/5ML
5 SOLUTION ORAL EVERY 6 HOURS PRN
Status: DISCONTINUED | OUTPATIENT
Start: 2024-02-28 | End: 2024-03-01 | Stop reason: HOSPADM

## 2024-02-28 RX ORDER — FLUOXETINE HYDROCHLORIDE 20 MG/1
20 CAPSULE ORAL DAILY
Status: DISCONTINUED | OUTPATIENT
Start: 2024-02-29 | End: 2024-03-01 | Stop reason: HOSPADM

## 2024-02-28 RX ORDER — IBUPROFEN 200 MG
16 TABLET ORAL
Status: DISCONTINUED | OUTPATIENT
Start: 2024-02-28 | End: 2024-02-29

## 2024-02-28 RX ORDER — TRAMADOL HYDROCHLORIDE 50 MG/1
50 TABLET ORAL EVERY 6 HOURS PRN
Status: DISCONTINUED | OUTPATIENT
Start: 2024-02-28 | End: 2024-03-01 | Stop reason: HOSPADM

## 2024-02-28 RX ORDER — BENZONATATE 100 MG/1
100 CAPSULE ORAL 3 TIMES DAILY PRN
Status: DISCONTINUED | OUTPATIENT
Start: 2024-02-28 | End: 2024-02-28

## 2024-02-28 RX ORDER — IBUPROFEN 200 MG
24 TABLET ORAL
Status: DISCONTINUED | OUTPATIENT
Start: 2024-02-28 | End: 2024-02-29

## 2024-02-28 RX ORDER — INSULIN ASPART 100 [IU]/ML
0-5 INJECTION, SOLUTION INTRAVENOUS; SUBCUTANEOUS
Status: DISCONTINUED | OUTPATIENT
Start: 2024-02-28 | End: 2024-02-29

## 2024-02-28 RX ORDER — CETIRIZINE HYDROCHLORIDE 5 MG/1
5 TABLET ORAL DAILY
Status: DISCONTINUED | OUTPATIENT
Start: 2024-02-29 | End: 2024-03-01 | Stop reason: HOSPADM

## 2024-02-28 RX ORDER — PANTOPRAZOLE SODIUM 40 MG/1
40 TABLET, DELAYED RELEASE ORAL EVERY MORNING
Status: DISCONTINUED | OUTPATIENT
Start: 2024-02-29 | End: 2024-03-01 | Stop reason: HOSPADM

## 2024-02-28 RX ORDER — SODIUM CHLORIDE 9 MG/ML
INJECTION, SOLUTION INTRAVENOUS ONCE
Status: COMPLETED | OUTPATIENT
Start: 2024-02-28 | End: 2024-02-28

## 2024-02-28 RX ORDER — HYDROCODONE BITARTRATE AND ACETAMINOPHEN 7.5; 325 MG/1; MG/1
1 TABLET ORAL EVERY 6 HOURS PRN
Status: DISCONTINUED | OUTPATIENT
Start: 2024-02-28 | End: 2024-02-28

## 2024-02-28 RX ORDER — IPRATROPIUM BROMIDE AND ALBUTEROL SULFATE 2.5; .5 MG/3ML; MG/3ML
3 SOLUTION RESPIRATORY (INHALATION) EVERY 6 HOURS PRN
Status: DISCONTINUED | OUTPATIENT
Start: 2024-02-28 | End: 2024-03-01 | Stop reason: HOSPADM

## 2024-02-28 RX ORDER — LEVOTHYROXINE SODIUM 88 UG/1
88 TABLET ORAL NIGHTLY
Status: DISCONTINUED | OUTPATIENT
Start: 2024-02-28 | End: 2024-03-01 | Stop reason: HOSPADM

## 2024-02-28 RX ORDER — GLUCAGON 1 MG
1 KIT INJECTION
Status: DISCONTINUED | OUTPATIENT
Start: 2024-02-28 | End: 2024-02-29

## 2024-02-28 RX ORDER — ACETAMINOPHEN 325 MG/1
650 TABLET ORAL EVERY 6 HOURS PRN
Status: DISCONTINUED | OUTPATIENT
Start: 2024-02-28 | End: 2024-03-01 | Stop reason: HOSPADM

## 2024-02-28 RX ORDER — ONDANSETRON 4 MG/1
4 TABLET, ORALLY DISINTEGRATING ORAL EVERY 6 HOURS PRN
Status: DISCONTINUED | OUTPATIENT
Start: 2024-02-28 | End: 2024-03-01 | Stop reason: HOSPADM

## 2024-02-28 RX ORDER — OLANZAPINE 5 MG/1
5 TABLET ORAL NIGHTLY
Status: DISCONTINUED | OUTPATIENT
Start: 2024-02-28 | End: 2024-03-01 | Stop reason: HOSPADM

## 2024-02-28 RX ORDER — LEVOFLOXACIN 5 MG/ML
750 INJECTION, SOLUTION INTRAVENOUS
Status: DISCONTINUED | OUTPATIENT
Start: 2024-02-28 | End: 2024-03-01

## 2024-02-28 RX ORDER — HYDRALAZINE HYDROCHLORIDE 20 MG/ML
10 INJECTION INTRAMUSCULAR; INTRAVENOUS EVERY 6 HOURS PRN
Status: DISCONTINUED | OUTPATIENT
Start: 2024-02-28 | End: 2024-03-01 | Stop reason: HOSPADM

## 2024-02-28 RX ADMIN — IPRATROPIUM BROMIDE AND ALBUTEROL SULFATE 3 ML: 2.5; .5 SOLUTION RESPIRATORY (INHALATION) at 10:02

## 2024-02-28 RX ADMIN — PROMETHAZINE HYDROCHLORIDE AND CODEINE PHOSPHATE 5 ML: 6.25; 1 SOLUTION ORAL at 10:02

## 2024-02-28 RX ADMIN — SODIUM CHLORIDE: 9 INJECTION, SOLUTION INTRAVENOUS at 05:02

## 2024-02-28 RX ADMIN — LEVOFLOXACIN 750 MG: 750 INJECTION, SOLUTION INTRAVENOUS at 03:02

## 2024-02-28 RX ADMIN — LEVOTHYROXINE SODIUM 88 MCG: 88 TABLET ORAL at 09:02

## 2024-02-28 RX ADMIN — IOHEXOL 100 ML: 350 INJECTION, SOLUTION INTRAVENOUS at 11:02

## 2024-02-28 NOTE — PHARMACY MED REC
"Admission Medication History     The home medication history was taken by Bo Barahona.    You may go to "Admission" then "Reconcile Home Medications" tabs to review and/or act upon these items.     The home medication list has been updated by the Pharmacy department.   Please read ALL comments highlighted in yellow.   Please address this information as you see fit.    Feel free to contact us if you have any questions or require assistance.      The medications listed below were removed from the home medication list. Please reorder if appropriate:  Patient reports no longer taking the following medication(s):  FOLIC ACID 1MG  PERCOCET 5-325MG  CONCERTA 18MG      Medications listed below were obtained from: Patient/family and Analytic software- [employer]  (Not in a hospital admission)        Bo Barahona  [id number]      Current Outpatient Medications on File Prior to Encounter   Medication Sig Dispense Refill Last Dose    cycloSPORINE (RESTASIS) 0.05 % ophthalmic emulsion Place 1 drop into both eyes 2 (two) times a day.   2/28/2024    FLUoxetine 20 MG capsule Take 20 mg by mouth once daily.   2/28/2024    levothyroxine (SYNTHROID) 88 MCG tablet Take 88 mcg by mouth every evening.   2/27/2024    osimertinib (TAGRISSO) 80 mg Tab Take 1 tab (80 mg) by mouth once daily. 30 tablet 11 2/28/2024    pantoprazole (PROTONIX) 40 MG tablet Take 1 tablet by mouth every morning.   2/28/2024    predniSONE (DELTASONE) 10 MG tablet Take 1 tablet (10 mg total) by mouth once daily. 30 tablet 2 2/27/2024    albuterol (VENTOLIN HFA) 90 mcg/actuation inhaler Inhale 2 puffs into the lungs every 6 (six) hours as needed for Wheezing. Rescue 18 g 0     amlodipine-olmesartan (GREGORY) 5-20 mg per tablet Take 1 tablet by mouth once daily.   not taking    azelastine (ASTELIN) 137 mcg (0.1 %) nasal spray 1 spray by Nasal route 2 (two) times daily.       cetirizine (ZYRTEC) 10 MG tablet Take 5 mg by mouth once daily.       eszopiclone " (LUNESTA) 3 mg Tab Take 3 mg by mouth every evening.       fluticasone propionate (FLONASE) 50 mcg/actuation nasal spray 1 spray by Each Nostril route 2 (two) times daily.       hydrOXYzine HCL (ATARAX) 25 MG tablet Take 25 mg by mouth once daily.       ibuprofen (ADVIL,MOTRIN) 200 MG tablet Take by mouth every 6 (six) hours as needed.       ipratropium (ATROVENT) 21 mcg (0.03 %) nasal spray SMARTSI Spray(s) Both Nares 2-3 Times Daily       multivitamin capsule Take 1 capsule by mouth once daily.       mupirocin (BACTROBAN) 2 % ointment Apply topically 3 (three) times daily.       OLANZapine (ZYPREXA) 5 MG tablet Take 5 mg by mouth every evening.       ondansetron (ZOFRAN-ODT) 4 MG TbDL Take 2 mg by mouth every 6 (six) hours as needed.       promethazine (PHENERGAN) 25 MG tablet Take 25 mg by mouth every 6 (six) hours as needed.       varenicline 0.03 mg/spray sprm 1 spray by Each Nostril route 2 (two) times daily.   not taking                         .

## 2024-02-28 NOTE — ASSESSMENT & PLAN NOTE
Reports glucose has been controlled   Without need for oral meds  Will cont sliding scale for 24hrs  Ok to dc accuchecks if glucose controlled

## 2024-02-28 NOTE — SUBJECTIVE & OBJECTIVE
Past Medical History:   Diagnosis Date    Malignant neoplasm of upper lobe of left lung 1/19/2023    Thyroid disease        Past Surgical History:   Procedure Laterality Date    CHOLECYSTECTOMY      ENDOBRONCHIAL ULTRASOUND Left 1/5/2023    Procedure: ENDOBRONCHIAL ULTRASOUND (EBUS);  Surgeon: Bam Catalan MD;  Location: [facility];  Service: Pulmonary;  Laterality: Left;    HYSTERECTOMY      NAVIGATIONAL BRONCHOSCOPY Left 1/5/2023    Procedure: BRONCHOSCOPY, NAVIGATIONAL;  Surgeon: Bam Catalan MD;  Location: [facility];  Service: Pulmonary;  Laterality: Left;       Review of patient's allergies indicates:   Allergen Reactions    Xylocaine with epinephrine [lidocaine-epinephrine] Anaphylaxis    Lipitor [atorvastatin] Other (See Comments)     Body aches      Methocarbamol-aspirin     Zetia [ezetimibe] Other (See Comments)     Muscle spasms      Augmentin [amoxicillin-pot clavulanate] Rash    Macrodantin [nitrofurantoin macrocrystal] Rash    Omnicef [cefdinir] Rash and Other (See Comments)     GI upset    Pravastatin Rash    Sulfa (sulfonamide antibiotics) Rash       Current Facility-Administered Medications on File Prior to Encounter   Medication    [COMPLETED] iohexoL (OMNIPAQUE 350) injection 100 mL     Current Outpatient Medications on File Prior to Encounter   Medication Sig    cycloSPORINE (RESTASIS) 0.05 % ophthalmic emulsion Place 1 drop into both eyes 2 (two) times a day.    FLUoxetine 20 MG capsule Take 20 mg by mouth once daily.    levothyroxine (SYNTHROID) 88 MCG tablet Take 88 mcg by mouth every evening.    osimertinib (TAGRISSO) 80 mg Tab Take 1 tab (80 mg) by mouth once daily.    pantoprazole (PROTONIX) 40 MG tablet Take 1 tablet by mouth every morning.    predniSONE (DELTASONE) 10 MG tablet Take 1 tablet (10 mg total) by mouth once daily.    albuterol (VENTOLIN HFA) 90 mcg/actuation inhaler Inhale 2 puffs into the lungs every 6 (six) hours as needed for Wheezing. Rescue    amlodipine-olmesartan  (GREGORY) 5-20 mg per tablet Take 1 tablet by mouth once daily.    azelastine (ASTELIN) 137 mcg (0.1 %) nasal spray 1 spray by Nasal route 2 (two) times daily.    cetirizine (ZYRTEC) 10 MG tablet Take 5 mg by mouth once daily.    eszopiclone (LUNESTA) 3 mg Tab Take 3 mg by mouth every evening.    fluticasone propionate (FLONASE) 50 mcg/actuation nasal spray 1 spray by Each Nostril route 2 (two) times daily.    hydrOXYzine HCL (ATARAX) 25 MG tablet Take 25 mg by mouth once daily.    ibuprofen (ADVIL,MOTRIN) 200 MG tablet Take by mouth every 6 (six) hours as needed.    ipratropium (ATROVENT) 21 mcg (0.03 %) nasal spray SMARTSI Spray(s) Both Nares 2-3 Times Daily    multivitamin capsule Take 1 capsule by mouth once daily.    mupirocin (BACTROBAN) 2 % ointment Apply topically 3 (three) times daily.    OLANZapine (ZYPREXA) 5 MG tablet Take 5 mg by mouth every evening.    ondansetron (ZOFRAN-ODT) 4 MG TbDL Take 2 mg by mouth every 6 (six) hours as needed.    promethazine (PHENERGAN) 25 MG tablet Take 25 mg by mouth every 6 (six) hours as needed.    varenicline 0.03 mg/spray sprm 1 spray by Each Nostril route 2 (two) times daily.    [DISCONTINUED] folic acid (FOLVITE) 1 MG tablet Take 1 tablet by mouth every morning.    [DISCONTINUED] hydrocortisone 2.5 % lotion Apply topically.    [DISCONTINUED] methylphenidate HCl 18 MG CR tablet Take 18 mg by mouth every morning.    [DISCONTINUED] oxyCODONE-acetaminophen (PERCOCET) 5-325 mg per tablet Take 1 tablet by mouth 2 (two) times daily as needed.     Family History       Problem Relation (Age of Onset)    Diabetes Mother, Brother    Heart failure Mother    Rheum arthritis Father          Tobacco Use    Smoking status: Former     Current packs/day: 0.00     Types: Cigarettes     Quit date:      Years since quittin.1     Passive exposure: Never    Smokeless tobacco: Never   Substance and Sexual Activity    Alcohol use: Yes     Alcohol/week: 2.0 standard drinks of alcohol      Types: 2 Glasses of wine per week     Comment: socially    Drug use: Not Currently    Sexual activity: Not Currently     Review of Systems   Constitutional:  Negative for fatigue and fever.   HENT:  Negative for sinus pressure.    Eyes:  Negative for visual disturbance.   Respiratory:  Positive for cough and shortness of breath.    Cardiovascular:  Negative for chest pain.   Gastrointestinal:  Negative for nausea and vomiting.   Genitourinary:  Negative for difficulty urinating.   Musculoskeletal:  Negative for back pain.   Skin:  Negative for rash.   Neurological:  Negative for headaches.   Psychiatric/Behavioral:  Negative for confusion.      Objective:     Vital Signs (Most Recent):  Temp: 98.5 °F (36.9 °C) (02/28/24 1335)  Pulse: 86 (02/28/24 1515)  Resp: 20 (02/28/24 1515)  BP: 135/65 (02/28/24 1515)  SpO2: (!) 94 % (02/28/24 1515) Vital Signs (24h Range):  Temp:  [97.9 °F (36.6 °C)-98.5 °F (36.9 °C)] 98.5 °F (36.9 °C)  Pulse:  [] 86  Resp:  [16-21] 20  SpO2:  [93 %-96 %] 94 %  BP: (133-146)/(65-87) 135/65     Weight: 78.7 kg (173 lb 8 oz)  Body mass index is 24.89 kg/m².     Physical Exam  Constitutional:       General: She is not in acute distress.     Appearance: She is well-developed. She is not diaphoretic.   HENT:      Head: Normocephalic and atraumatic.   Eyes:      Pupils: Pupils are equal, round, and reactive to light.   Cardiovascular:      Rate and Rhythm: Normal rate and regular rhythm.      Heart sounds: Normal heart sounds. No murmur heard.     No friction rub. No gallop.   Pulmonary:      Effort: Pulmonary effort is normal. No respiratory distress.      Breath sounds: No stridor. Rhonchi present. No wheezing or rales.   Abdominal:      General: Bowel sounds are normal. There is no distension.      Palpations: Abdomen is soft. There is no mass.      Tenderness: There is no abdominal tenderness. There is no guarding.   Musculoskeletal:      Right lower leg: No edema.      Left lower leg:  No edema.   Skin:     General: Skin is warm.      Findings: No erythema.   Neurological:      Mental Status: She is alert and oriented to person, place, and time.              CRANIAL NERVES     CN III, IV, VI   Pupils are equal, round, and reactive to light.       Significant Labs:   Results for orders placed or performed during the hospital encounter of 02/28/24   Influenza A & B by Molecular    Specimen: Nasal Swab   Result Value Ref Range    Influenza A, Molecular Negative Negative    Influenza B, Molecular Negative Negative    Flu A & B Source Nasal swab    CBC auto differential   Result Value Ref Range    WBC 7.36 3.90 - 12.70 K/uL    RBC 3.63 (L) 4.00 - 5.40 M/uL    Hemoglobin 9.6 (L) 12.0 - 16.0 g/dL    Hematocrit 30.3 (L) 37.0 - 48.5 %    MCV 84 82 - 98 fL    MCH 26.4 (L) 27.0 - 31.0 pg    MCHC 31.7 (L) 32.0 - 36.0 g/dL    RDW 13.1 11.5 - 14.5 %    Platelets 411 150 - 450 K/uL    MPV 9.2 9.2 - 12.9 fL    Immature Granulocytes 0.8 (H) 0.0 - 0.5 %    Gran # (ANC) 6.6 1.8 - 7.7 K/uL    Immature Grans (Abs) 0.06 (H) 0.00 - 0.04 K/uL    Lymph # 0.4 (L) 1.0 - 4.8 K/uL    Mono # 0.3 0.3 - 1.0 K/uL    Eos # 0.0 0.0 - 0.5 K/uL    Baso # 0.03 0.00 - 0.20 K/uL    nRBC 0 0 /100 WBC    Gran % 89.5 (H) 38.0 - 73.0 %    Lymph % 4.8 (L) 18.0 - 48.0 %    Mono % 4.2 4.0 - 15.0 %    Eosinophil % 0.3 0.0 - 8.0 %    Basophil % 0.4 0.0 - 1.9 %    Differential Method Automated    Comprehensive metabolic panel   Result Value Ref Range    Sodium 136 136 - 145 mmol/L    Potassium 4.5 3.5 - 5.1 mmol/L    Chloride 107 95 - 110 mmol/L    CO2 18 (L) 23 - 29 mmol/L    Glucose 146 (H) 70 - 110 mg/dL    BUN 21 8 - 23 mg/dL    Creatinine 1.3 0.5 - 1.4 mg/dL    Calcium 9.1 8.7 - 10.5 mg/dL    Total Protein 7.4 6.0 - 8.4 g/dL    Albumin 3.1 (L) 3.5 - 5.2 g/dL    Total Bilirubin 0.2 0.1 - 1.0 mg/dL    Alkaline Phosphatase 91 55 - 135 U/L    AST 37 10 - 40 U/L    ALT 35 10 - 44 U/L    eGFR 46 (A) >60 mL/min/1.73 m^2    Anion Gap 11 8 - 16  mmol/L   Lactic acid, plasma #1   Result Value Ref Range    Lactate (Lactic Acid) 1.7 0.5 - 2.2 mmol/L   Urinalysis, Reflex to Urine Culture Urine, Clean Catch    Specimen: Urine   Result Value Ref Range    Specimen UA Urine, Clean Catch     Color, UA Yellow Yellow, Straw, Iliana    Appearance, UA Clear Clear    pH, UA 6.0 5.0 - 8.0    Specific Gravity, UA >1.030 (A) 1.005 - 1.030    Protein, UA Trace (A) Negative    Glucose, UA Negative Negative    Ketones, UA Negative Negative    Bilirubin (UA) Negative Negative    Occult Blood UA Negative Negative    Nitrite, UA Negative Negative    Urobilinogen, UA Negative <2.0 EU/dL    Leukocytes, UA 1+ (A) Negative   COVID-19 Rapid Screening   Result Value Ref Range    SARS-CoV-2 RNA, Amplification, Qual Negative Negative   BNP   Result Value Ref Range    BNP 28 0 - 99 pg/mL   Troponin I   Result Value Ref Range    Troponin I <0.006 0.000 - 0.026 ng/mL   Procalcitonin   Result Value Ref Range    Procalcitonin 0.06 <0.25 ng/mL   Urinalysis Microscopic   Result Value Ref Range    RBC, UA 4 0 - 4 /hpf    WBC, UA 6 (H) 0 - 5 /hpf    Microscopic Comment SEE COMMENT    EKG 12-lead   Result Value Ref Range    QRS Duration 76 ms    [facility] QTC Calculation 472 ms        Significant Imaging: X-Ray Chest AP Portable  Narrative: EXAMINATION:  XR CHEST AP PORTABLE    CLINICAL HISTORY:  Sepsis;    FINDINGS:  Single view of the chest.  No comparison    Cardiac silhouette is normal.  Streaky opacity seen within the perihilar regions and lower lobes could reflect interstitial pneumonia or viral pneumonitis.  No consolidation..  No evidence of pleural effusion or pneumothorax.  Bones appear intact.  Moderate degenerative changes and moderate atherosclerotic disease.  Impression: See findings above.  Recommend follow-up    Electronically signed by: Caleb Sandoval MD  Date:    02/28/2024  Time:    13:58  CTA Chest Non-Coronary (PE Studies)  Narrative: EXAM: CTA of the chest with contrast, with 3-D  reconstructions    HISTORY:  Pulmonary embolism (PE) suspected, unknown D-dimer; [R07.9]-Chest pain, unspecified.    TECHNIQUE: Routine CT angiogram of the chest was performed. 3-D reconstructions/reformats/MIPs obtained. All CT scans at [this location] are performed using dose modulation techniques as appropriate to a performed exam including the following: automated exposure control; adjustment of the mA and/or kV according to patient size (this includes techniques or standardized protocols for targeted exams where dose is matched to indication / reason for exam; i.e. extremities or head); use of iterative reconstruction technique.    Comparison: 12/22/2023    FINDINGS:  There is satisfactory opacification of the pulmonary arteries. No intraluminal filling defects are identified to suggest pulmonary embolism.    There has been interval development of extensive multifocal groundglass infiltrates scattered in the bilateral upper lobes, lower lobes and right middle lobe.  2.6 x 1.4 cm spiculated nodule in the left upper lobe with surrounding scarring, previously 3.0 x 1.7 cm.  There are 2 nodular infiltrates in the right lung base.  There is bibasilar subsegmental dependent atelectasis in the posterior lung bases.  No pleural effusion or pneumothorax identified. No pathologically enlarged mediastinal or hilar lymph nodes are present. Heart size is within normal limits. There is no significant pericardial effusion. Negative for aortic aneurysm or dissection. No significant coronary artery calcifications are noted. No acute osseous abnormality.  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  No new bone lesions identified..  Limited images through the upper abdomen demonstrate no acute or suspicious abnormality.  Impression: 1.  No evidence of pulmonary embolism.  2.  Interval development of multifocal bilateral pulmonary groundglass infiltrates and right basilar nodular  infiltrates most likely representing either atypical pneumonia or inflammatory pneumonitis.  3.  Slight interval decrease in size of the spiculated left upper lobe nodule compatible with patient's known history of lung cancer.  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    Finalized on: 2/28/2024 12:25 PM By:  Gerry Trejo MD  [facility]# [id number]      2024-02-28 12:27:[id number]    [place]TER[place]

## 2024-02-28 NOTE — ED PROVIDER NOTES
SCRIBE #1 NOTE: I, Oralia Elizabeth, am scribing for, and in the presence of, Malina Lynch MD. I have scribed the entire note.       History     Chief Complaint   Patient presents with    Shortness of Breath     Sent from [facility] for SOB and hypoxia with exertion. Also dx with bilat pneumonia. RA Sat 90% after walking to triage. Hx of metastatic lung CA. Currently taking doxycycline     Review of patient's allergies indicates:   Allergen Reactions    Xylocaine with epinephrine [lidocaine-epinephrine] Anaphylaxis    Lipitor [atorvastatin] Other (See Comments)     Body aches      Methocarbamol-aspirin     Zetia [ezetimibe] Other (See Comments)     Muscle spasms      Augmentin [amoxicillin-pot clavulanate] Rash    Macrodantin [nitrofurantoin macrocrystal] Rash    Omnicef [cefdinir] Rash and Other (See Comments)     GI upset    Pravastatin Rash    Sulfa (sulfonamide antibiotics) Rash         History of Present Illness     HPI    2/28/2024, 2:00 PM  History obtained from the patient      History of Present Illness: JACOBO COLEMAN is a 63 y.o. female patient with a PMHx of malignant neoplasm of upper lobe of the left lung who presents to the Emergency Department for evaluation of SOB which the pt states onset about a week ago. The pt states that her allergies flared up last week and her symptoms have worsened since then. The pt states that she was seen at an  on 2/24/24 where she was dx with bronchitis. She states that she was prescribed abx, but has not seen any improvement. Today, she was seen by Dr. Cheney (Hematology/Oncology) where a CTA of the chest was done. Pt states that she was also dx with bilateral pneumonia. Symptoms are constant and moderate in severity. Symptoms exacerbated by movement and talking. No associated sxs mentioned. Patient denies any fever, n/v/d, headaches, cough, congestion, rhinorrhea and all other sxs at this time. No further complaints or concerns at this time.       Arrival mode:  Personal vehicle    PCP: Sal Provider        Past Medical History:  Past Medical History:   Diagnosis Date    Malignant neoplasm of upper lobe of left lung 2023    Thyroid disease        Past Surgical History:  Past Surgical History:   Procedure Laterality Date    CHOLECYSTECTOMY      ENDOBRONCHIAL ULTRASOUND Left 2023    Procedure: ENDOBRONCHIAL ULTRASOUND (EBUS);  Surgeon: Bam Catalan MD;  Location: [facility];  Service: Pulmonary;  Laterality: Left;    HYSTERECTOMY      NAVIGATIONAL BRONCHOSCOPY Left 2023    Procedure: BRONCHOSCOPY, NAVIGATIONAL;  Surgeon: Bam Catalan MD;  Location: [facility];  Service: Pulmonary;  Laterality: Left;         Family History:  Family History   Problem Relation Age of Onset    Heart failure Mother     Diabetes Mother     Rheum arthritis Father     Diabetes Brother        Social History:  Social History     Tobacco Use    Smoking status: Former     Current packs/day: 0.00     Types: Cigarettes     Quit date:      Years since quittin.     Passive exposure: Never    Smokeless tobacco: Never   Substance and Sexual Activity    Alcohol use: Yes     Alcohol/week: 2.0 standard drinks of alcohol     Types: 2 Glasses of wine per week     Comment: socially    Drug use: Not Currently    Sexual activity: Not Currently        Review of Systems     Review of Systems   Constitutional:  Negative for fever.   HENT:  Negative for congestion, rhinorrhea and sore throat.    Respiratory:  Positive for shortness of breath. Negative for cough.    Cardiovascular:  Negative for chest pain.   Gastrointestinal:  Negative for diarrhea, nausea and vomiting.   Genitourinary:  Negative for dysuria.   Musculoskeletal:  Negative for back pain.   Skin:  Negative for rash.   Neurological:  Negative for weakness and headaches.   Hematological:  Does not bruise/bleed easily.   All other systems reviewed and are negative.     Physical Exam     Initial Vitals [24 1335]   BP  Pulse Resp Temp SpO2   (!) 146/87 105 (!) 21 98.5 °F (36.9 °C) (!) 93 %      MAP       --          Physical Exam  Nursing Notes and Vital Signs Reviewed.  Constitutional: Patient is in no acute distress. Non-toxic and non-ill appearing. Well-developed and well-nourished.  Head: Atraumatic. Normocephalic.  Eyes: PERRL. EOM intact. Conjunctivae are not pale. No scleral icterus.  ENT: Mucous membranes are moist. Oropharynx is clear and symmetric.    Neck: Supple. Full ROM. No lymphadenopathy.  Cardiovascular: Regular rate. Regular rhythm. No murmurs, rubs, or gallops. Distal pulses are 2+ and symmetric.  Pulmonary/Chest: No respiratory distress. Clear to auscultation bilaterally. No wheezing or rales.  Abdominal: Soft and non-distended.  There is no tenderness.  No rebound, guarding, or rigidity. Good bowel sounds.  Genitourinary: No CVA tenderness  Musculoskeletal: Moves all extremities. No obvious deformities. No edema. No calf tenderness.  Skin: Warm and dry.  Neurological:  Alert, awake, and appropriate.  Normal speech.  No acute focal neurological deficits are appreciated.  Psychiatric: Normal affect. Good eye contact. Appropriate in content.     ED Course   Critical Care    Date/Time: 2/28/2024 7:00 PM    Performed by: Malina Lynch MD  Authorized by: Rodríguez Mendoza MD  Direct patient critical care time: 25 minutes  Additional history critical care time: 8 minutes  Ordering / reviewing critical care time: 8 minutes  Documentation critical care time: 6 minutes  Consulting other physicians critical care time: 5 minutes  Total critical care time (exclusive of procedural time) : 52 minutes  Critical care was necessary to treat or prevent imminent or life-threatening deterioration of the following conditions: sepsis and respiratory failure.  Critical care was time spent personally by me on the following activities: blood draw for specimens, development of treatment plan with patient or surrogate, discussions with  consultants, interpretation of cardiac output measurements, evaluation of patient's response to treatment, examination of patient, obtaining history from patient or surrogate, ordering and performing treatments and interventions, ordering and review of laboratory studies, ordering and review of radiographic studies, pulse oximetry, re-evaluation of patient's condition, review of old charts and discussions with primary provider.        ED Vital Signs:  Vitals:    02/29/24 2100 02/29/24 2300 02/29/24 2346 03/01/24 0100   BP:   (!) 116/57    Pulse: 94 80 81 80   Resp:   17    Temp:   99 °F (37.2 °C)    TempSrc:   Oral    SpO2:   (!) 91%    Weight:   82 kg (180 lb 12.4 oz)    Height:        03/01/24 0300 03/01/24 0400 03/01/24 0435 03/01/24 0500   BP:   (!) 150/66    Pulse: 96 78 100 90   Resp:   18    Temp:   97.6 °F (36.4 °C)    TempSrc:   Oral    SpO2:   (!) 91%    Weight:       Height:        03/01/24 0725 03/01/24 0843 03/01/24 0849 03/01/24 1155   BP: 119/67   126/61   Pulse: 108 99 98 102   Resp: 18  20 16   Temp: 99 °F (37.2 °C)   (!) 101 °F (38.3 °C)   TempSrc: Oral   Oral   SpO2: (!) 93%  (!) 92% (!) 90%   Weight:       Height:        03/01/24 1300 03/01/24 1323 03/01/24 1527   BP:      Pulse:  78 89   Resp:  18    Temp: 99.1 °F (37.3 °C)     TempSrc:      SpO2: (!) 94% (!) 93%    Weight:      Height:          Abnormal Lab Results:  Labs Reviewed   CBC W/ AUTO DIFFERENTIAL - Abnormal; Notable for the following components:       Result Value    RBC 3.63 (*)     Hemoglobin 9.6 (*)     Hematocrit 30.3 (*)     MCH 26.4 (*)     MCHC 31.7 (*)     Immature Granulocytes 0.8 (*)     Immature Grans (Abs) 0.06 (*)     Lymph # 0.4 (*)     Gran % 89.5 (*)     Lymph % 4.8 (*)     All other components within normal limits   COMPREHENSIVE METABOLIC PANEL - Abnormal; Notable for the following components:    CO2 18 (*)     Glucose 146 (*)     Albumin 3.1 (*)     eGFR 46 (*)     All other components within normal limits    URINALYSIS, REFLEX TO URINE CULTURE - Abnormal; Notable for the following components:    Specific Gravity, UA >1.030 (*)     Protein, UA Trace (*)     Leukocytes, UA 1+ (*)     All other components within normal limits    Narrative:     Specimen Source->Urine   URINALYSIS MICROSCOPIC - Abnormal; Notable for the following components:    WBC, UA 6 (*)     All other components within normal limits    Narrative:     Specimen Source->Urine   POCT GLUCOSE - Abnormal; Notable for the following components:    POCT Glucose 192 (*)     All other components within normal limits   INFLUENZA A & B BY MOLECULAR   LACTIC ACID, PLASMA   SARS-COV-2 RNA AMPLIFICATION, QUAL   B-TYPE NATRIURETIC PEPTIDE   PROCALCITONIN   TROPONIN I   B-TYPE NATRIURETIC PEPTIDE   TROPONIN I   PROCALCITONIN   MAGNESIUM        All Lab Results:       Imaging Results:  Imaging Results              X-Ray Chest AP Portable (Final result)  Result time 02/28/24 13:58:51      Final result by Caleb Snadoval MD (02/28/24 13:58:51)                   Impression:      See findings above.  Recommend follow-up      Electronically signed by: Caleb Sandoval MD  Date:    02/28/2024  Time:    13:58               Narrative:    EXAMINATION:  XR CHEST AP PORTABLE    CLINICAL HISTORY:  Sepsis;    FINDINGS:  Single view of the chest.  No comparison    Cardiac silhouette is normal.  Streaky opacity seen within the perihilar regions and lower lobes could reflect interstitial pneumonia or viral pneumonitis.  No consolidation..  No evidence of pleural effusion or pneumothorax.  Bones appear intact.  Moderate degenerative changes and moderate atherosclerotic disease.                                       The EKG was ordered, reviewed, and independently interpreted by the ED provider.  Interpretation time: 14:10  Rate: 91 BPM  Rhythm: normal sinus rhythm  Interpretation: Minimal voltage criteria for LVH, may be normal variant. No STEMI.             The Emergency Provider reviewed  the vital signs and test results, which are outlined above.     ED Discussion     3:28 PM: Discussed case with Dr. Mendoza ([facility]). Dr. Mendoza agrees with current care and management of pt and accepts admission.   Admitting Service: [facility]  Admitting Physician: Dr. Mendoza  Admit to: Med Surg     3:31 PM: Re-evaluated pt. I have discussed test results, shared treatment plan, and the need for admission with patient and family at bedside. Pt and family express understanding at this time and agree with all information. All questions answered. Pt and family have no further questions or concerns at this time. Pt is ready for admit.        Medical Decision Making  DDX:  1. Bilateral pneumonia  2. Viral syndrome  3. Dehydration    Hx of lung cancer on oral chemo sent to the ER for CT findings done today consistent with bilateral pneumonia, patient non-ill appearing, sepsis markers are negative, chronic anemia noted, troponin and BNP normal, kidney function normal, levaquin ordered as patient with pcn allergy, [facility] med consulted for admission. ECG no ischemic changes noted, CXR unchanged from CT findings.     Amount and/or Complexity of Data Reviewed  External Data Reviewed: radiology.     Details: Had outpatient CTA chest done today showing bilateral pneumonia, no PE  Labs: ordered. Decision-making details documented in ED Course.  Radiology: ordered. Decision-making details documented in ED Course.  ECG/medicine tests: ordered and independent interpretation performed. Decision-making details documented in ED Course.    Risk  Prescription drug management.  Decision regarding hospitalization.                ED Medication(s):  Medications   0.9%  NaCl infusion ( Intravenous Verify Only 2/28/24 1943)       Discharge Medication List as of 3/1/2024  4:04 PM        START taking these medications    Details   levoFLOXacin (LEVAQUIN) 750 MG tablet Take 1 tablet (750 mg total) by mouth once daily. for 5 days, Starting  Fri 3/1/2024, Until Wed 3/6/2024, Normal      promethazine-dextromethorphan (PROMETHAZINE-DM) 6.25-15 mg/5 mL Syrp Take 5 mLs by mouth every 4 (four) hours as needed., Starting Fri 3/1/2024, Until Mon 3/11/2024 at 2359, Normal              Follow-up Information       Ra Rivero MD Follow up.    Why: PCP follow up  Contact information:  Address: [facility] [place] [address]  Phone: [phone]                               Scribe Attestation:   Scribe #1: I performed the above scribed service and the documentation accurately describes the services I performed. I attest to the accuracy of the note.     Attending:   Physician Attestation Statement for Scribe #1: I, Malina Lynch MD, personally performed the services described in this documentation, as scribed by Oralia Elizabeth, in my presence, and it is both accurate and complete.           Clinical Impression       ICD-10-CM ICD-9-CM   1. Pneumonia of both lower lobes due to infectious organism  J18.9 486   2. Sepsis  A41.9 038.9     995.91   3. History of lung cancer  Z85.118 V10.11   4. Immunocompromised state due to drug therapy  D84.821 V58.69    Z79.899        Disposition:   Disposition: Admitted  Condition: Malina Hogan MD  03/02/24 1343

## 2024-02-28 NOTE — PROGRESS NOTES
Pharmacist Renal Dose Adjustment Note    JACOBO COLEMAN is a 63 y.o. female being treated with the medication levofloxacin.    Patient Data:    Vital Signs (Most Recent):  Temp: 98.5 °F (36.9 °C) (02/28/24 1335)  Pulse: 105 (02/28/24 1335)  Resp: (!) 21 (02/28/24 1335)  BP: (!) 146/87 (02/28/24 1335)  SpO2: (!) 93 % (02/28/24 1335) Vital Signs (72h Range):  Temp:  [97.9 °F (36.6 °C)-98.5 °F (36.9 °C)]   Pulse:  [104-105]   Resp:  [16-21]   BP: (137-146)/(77-87)   SpO2:  [93 %-96 %]      Recent Labs   Lab 02/28/24  1012   CREATININE 1.3     Serum creatinine: 1.3 mg/dL 02/28/24 1012  Estimated creatinine clearance: 47.3 mL/min    Medication: levofloxacin dose: 750 mg frequency every 24 hours will be changed to medication: levofloxacin dose: 750 mg frequency: every 48 hours, for CrCl 20-49 mL/min.    Pharmacist's Name: Shira Carlton

## 2024-02-28 NOTE — ASSESSMENT & PLAN NOTE
Bilateral pneumonia noted on CTA   Sputum culture   Patient immunocompromised   Given allergies will continue IV Levaquin

## 2024-02-28 NOTE — MEDICAL/APP STUDENT
"[facility] Medicine Student   History and Physical  Networked reference to record PCT   02/28/2024  4:43 PM    SUBJECTIVE:     Chief Complaint:  SOB, weakness, and cough    History of Present Illness:  JACOBO COLEMAN is a 63 y.o. female with a relevant medical history of non-small cell lung cancer and thyroid disease who presents with ongoing  SOB, generalised weakness, and cough for the past ~2 weeks. Pt has known lung malignancy currently treated with osimertinib with spinals mets treated with palliative radiation. Early last week, she began to have runny nose and sinus congestion which she initially attributed to allergies but did not improve after taking zyrtec, robitussin, and flonase. On Thursday, she began coughing and lost her voice but never experienced a sore throat. Her cough became productive with copious dark green sputum that has since cleared after starting antibiotics. Over the weekend, she lost her appetite and began feeling generally unwell, prompting presentation to an Urgent Care where she was  prescribed doxycycline, prednisone, and phenergan which improved her cough. She reports having orthostatic hypotension on Sunday, so she ceased her amlodipine/olmesartan and hasn't had episodes of orthostatic hypotension since this time. She has been afebrile throughout course of illness but reports chills/rigors everyday around 1400.     Today, pt presented to Dr Cheney for a routine office visit where she was extremely SOB while seated talking, though she reports O2 sats at this time were around 94%. CXR and CTA were ordered due to concern for PE which showed bilateral perihilar and R basilar infiltrates suggestive of atypical pneumonia or inflammatory pneumonitis. No evidence of PE and L upper lobe nodule was slightly decreased from previous scans. Her main complaints at this time are ongoing fatigue,weakness,and SOB and "daniel horses" of the feet and toes. She also notes residual lateral L sided lower " lung/upper abdo discomfort from pleural biopsies and associated outpouching from muscle weakness of the area. Additionally, ~1 month ago she developed a MRSA cellulitis s/p atypical mole removal just below R underarm area, which has since resolved.     Review of Systems   Constitutional:  Positive for chills and malaise/fatigue. Negative for fever.   HENT:  Positive for congestion and nosebleeds. Negative for ear discharge, ear pain and sore throat.    Eyes:  Negative for discharge and redness.   Respiratory:  Positive for cough, sputum production and shortness of breath.         Cough and sputum production resolved   Cardiovascular:  Negative for chest pain, orthopnea and leg swelling.   Gastrointestinal:  Positive for abdominal pain. Negative for diarrhea, nausea and vomiting.        Ongoing LUQ/lateral abdo pain from previous operation   Genitourinary:  Negative for dysuria, flank pain, frequency, hematuria and urgency.   Musculoskeletal:  Positive for back pain.        Intermittent back pain from spinal mets, typically well controlled   Skin:  Negative for itching and rash.   Neurological:  Positive for weakness and headaches.   Endo/Heme/Allergies: Negative.          HISTORY:     Past Medical History:   Diagnosis Date    Malignant neoplasm of upper lobe of left lung 1/19/2023    Thyroid disease        Past Surgical History:   Procedure Laterality Date    CHOLECYSTECTOMY      ENDOBRONCHIAL ULTRASOUND Left 1/5/2023    Procedure: ENDOBRONCHIAL ULTRASOUND (EBUS);  Surgeon: Bam Catalan MD;  Location: [facility];  Service: Pulmonary;  Laterality: Left;    HYSTERECTOMY      NAVIGATIONAL BRONCHOSCOPY Left 1/5/2023    Procedure: BRONCHOSCOPY, NAVIGATIONAL;  Surgeon: Bam Catalan MD;  Location: [facility];  Service: Pulmonary;  Laterality: Left;       Family History   Problem Relation Age of Onset    Heart failure Mother     Diabetes Mother     Rheum arthritis Father     Diabetes Brother        Social History      Socioeconomic History    Marital status:    Tobacco Use    Smoking status: Former     Current packs/day: 0.00     Types: Cigarettes     Quit date:      Years since quittin.1     Passive exposure: Never    Smokeless tobacco: Never   Substance and Sexual Activity    Alcohol use: Yes     Alcohol/week: 2.0 standard drinks of alcohol     Types: 2 Glasses of wine per week     Comment: socially    Drug use: Not Currently    Sexual activity: Not Currently   Social History Narrative    ** Merged History Encounter **          Social Determinants of Health     Financial Resource Strain: Low Risk  (2023)    Overall Financial Resource Strain (CARDIA)     Difficulty of Paying Living Expenses: Not hard at all   Food Insecurity: No Food Insecurity (2023)    Hunger Vital Sign     Worried About Running Out of Food in the Last Year: Never true     Ran Out of Food in the Last Year: Never true   Transportation Needs: No Transportation Needs (2023)    PRAPARE - Transportation     Lack of Transportation (Medical): No     Lack of Transportation (Non-Medical): No   Physical Activity: Sufficiently Active (2023)    Exercise Vital Sign     Days of Exercise per Week: 5 days     Minutes of Exercise per Session: 30 min   Stress: No Stress Concern Present (2023)    [employer] [place] [employer] - Occupational Stress Questionnaire     Feeling of Stress : Not at all   Social Connections: Unknown (2023)    Social Connection and Isolation Panel [NHANES]     Frequency of Communication with Friends and Family: More than three times a week     Frequency of Social Gatherings with Friends and Family: Once a week     Active Member of Clubs or Organizations: Yes     Attends Club or Organization Meetings: More than 4 times per year     Marital Status: Living with partner   Housing Stability: Low Risk  (2023)    Housing Stability Vital Sign     Unable to Pay for Housing in the Last Year:  No     Number of Places Lived in the Last Year: 1     Unstable Housing in the Last Year: No     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 Mobilizes independently.    MEDICATIONS & ALLERGIES:     Current Facility-Administered Medications on File Prior to Encounter   Medication Dose Route Frequency Provider Last Rate Last Admin    [COMPLETED] iohexoL (OMNIPAQUE 350) injection 100 mL  100 mL Intravenous ONCE PRN Zenon Cheney MD   100 mL at 24 1153     Current Outpatient Medications on File Prior to Encounter   Medication Sig Dispense Refill    cycloSPORINE (RESTASIS) 0.05 % ophthalmic emulsion Place 1 drop into both eyes 2 (two) times a day.      FLUoxetine 20 MG capsule Take 20 mg by mouth once daily.      levothyroxine (SYNTHROID) 88 MCG tablet Take 88 mcg by mouth every evening.      osimertinib (TAGRISSO) 80 mg Tab Take 1 tab (80 mg) by mouth once daily. 30 tablet 11    pantoprazole (PROTONIX) 40 MG tablet Take 1 tablet by mouth every morning.      predniSONE (DELTASONE) 10 MG tablet Take 1 tablet (10 mg total) by mouth once daily. 30 tablet 2    albuterol (VENTOLIN HFA) 90 mcg/actuation inhaler Inhale 2 puffs into the lungs every 6 (six) hours as needed for Wheezing. Rescue 18 g 0    amlodipine-olmesartan (GREGORY) 5-20 mg per tablet Take 1 tablet by mouth once daily.      azelastine (ASTELIN) 137 mcg (0.1 %) nasal spray 1 spray by Nasal route 2 (two) times daily.      cetirizine (ZYRTEC) 10 MG tablet Take 5 mg by mouth once daily.      eszopiclone (LUNESTA) 3 mg Tab Take 3 mg by mouth every evening.      fluticasone propionate (FLONASE) 50 mcg/actuation nasal spray 1 spray by Each Nostril route 2 (two) times daily.      hydrOXYzine HCL (ATARAX) 25 MG tablet Take 25 mg by mouth once daily.      ibuprofen (ADVIL,MOTRIN) 200 MG tablet Take by mouth every 6 (six) hours as needed.      ipratropium (ATROVENT) 21 mcg (0.03 %) nasal spray SMARTSI Spray(s) Both Nares 2-3 Times Daily      multivitamin capsule Take 1  capsule by mouth once daily.      mupirocin (BACTROBAN) 2 % ointment Apply topically 3 (three) times daily.      OLANZapine (ZYPREXA) 5 MG tablet Take 5 mg by mouth every evening.      ondansetron (ZOFRAN-ODT) 4 MG TbDL Take 2 mg by mouth every 6 (six) hours as needed.      promethazine (PHENERGAN) 25 MG tablet Take 25 mg by mouth every 6 (six) hours as needed.      varenicline 0.03 mg/spray sprm 1 spray by Each Nostril route 2 (two) times daily.      [DISCONTINUED] folic acid (FOLVITE) 1 MG tablet Take 1 tablet by mouth every morning.      [DISCONTINUED] hydrocortisone 2.5 % lotion Apply topically.      [DISCONTINUED] methylphenidate HCl 18 MG CR tablet Take 18 mg by mouth every morning.      [DISCONTINUED] oxyCODONE-acetaminophen (PERCOCET) 5-325 mg per tablet Take 1 tablet by mouth 2 (two) times daily as needed.       Pt self ceased amlodipine-olmesartan on Sunday due to episodes of orthostatic hypotension.     Review of patient's allergies indicates:   Allergen Reactions    Xylocaine with epinephrine [lidocaine-epinephrine] Anaphylaxis    Lipitor [atorvastatin] Other (See Comments)     Body aches      Methocarbamol-aspirin     Zetia [ezetimibe] Other (See Comments)     Muscle spasms      Augmentin [amoxicillin-pot clavulanate] Rash    Macrodantin [nitrofurantoin macrocrystal] Rash    Omnicef [cefdinir] Rash and Other (See Comments)     GI upset    Pravastatin Rash    Sulfa (sulfonamide antibiotics) Rash       OBJECTIVE:     Vital Signs Recent:  Temp: 98.5 °F (36.9 °C) (02/28/24 1335)  Pulse: 87 (02/28/24 1630)  Resp: 20 (02/28/24 1630)  BP: 137/75 (02/28/24 1630)  SpO2: 95 % (02/28/24 1630)  Oxygen Documentation:                Device (Oxygen Therapy): room air         Vital Signs Range (Last 24H):  Temp:  [97.9 °F (36.6 °C)-98.5 °F (36.9 °C)]   Pulse:  []   Resp:  [16-21]   BP: (125-146)/(65-87)   SpO2:  [93 %-96 %]        Weight:  Body mass index is 24.89 kg/m².  Wt Readings from Last 3 Encounters:    02/28/24 78.7 kg (173 lb 8 oz)   02/28/24 78.5 kg (173 lb 1 oz)   02/20/24 79.4 kg (175 lb 0.7 oz)        Physical Exam  Constitutional:       Appearance: Normal appearance.   HENT:      Head: Normocephalic.      Nose: Nose normal.      Mouth/Throat:      Mouth: Mucous membranes are moist.   Eyes:      Extraocular Movements: Extraocular movements intact.      Conjunctiva/sclera: Conjunctivae normal.      Pupils: Pupils are equal, round, and reactive to light.   Cardiovascular:      Rate and Rhythm: Normal rate and regular rhythm.      Heart sounds: No murmur heard.  Pulmonary:      Effort: Pulmonary effort is normal.      Breath sounds: Normal breath sounds.   Musculoskeletal:         General: Normal range of motion.      Cervical back: Normal range of motion.   Skin:     General: Skin is warm and dry.      Capillary Refill: Capillary refill takes less than 2 seconds.   Neurological:      Mental Status: She is alert and oriented to person, place, and time.   Psychiatric:         Behavior: Behavior normal.         Thought Content: Thought content normal.        Diagnostic Results:  Sodium (mmol/L)   Date Value   02/28/2024 136   02/28/2024 141   12/12/2023 140     Potassium (mmol/L)   Date Value   02/28/2024 4.5   02/28/2024 3.8   12/12/2023 4.4     Chloride (mmol/L)   Date Value   02/28/2024 107   02/28/2024 110   12/12/2023 106     CO2 (mmol/L)   Date Value   02/28/2024 18 (L)   02/28/2024 22 (L)   12/12/2023 23     BUN (mg/dL)   Date Value   02/28/2024 21   02/28/2024 22   12/12/2023 15     Creatinine (mg/dL)   Date Value   02/28/2024 1.3   02/28/2024 1.3   12/12/2023 1.5 (H)     Glucose (mg/dL)   Date Value   02/28/2024 146 (H)   02/28/2024 159 (H)   12/12/2023 145 (H)     Calcium (mg/dL)   Date Value   02/28/2024 9.1   02/28/2024 9.4   12/12/2023 9.0     Alkaline Phosphatase (U/L)   Date Value   02/28/2024 91   02/28/2024 92   12/12/2023 94     ALT (U/L)   Date Value   02/28/2024 35   02/28/2024 34    12/12/2023 53 (H)     AST (U/L)   Date Value   02/28/2024 37   02/28/2024 34   12/12/2023 59 (H)     Albumin (g/dL)   Date Value   02/28/2024 3.1 (L)   02/28/2024 3.2 (L)   12/12/2023 3.8     Total Protein (g/dL)   Date Value   02/28/2024 7.4   02/28/2024 7.7   12/12/2023 7.6     Total Bilirubin (mg/dL)   Date Value   02/28/2024 0.2   02/28/2024 0.2   12/12/2023 0.2     INR (no units)   Date Value   01/30/2023 0.9       WBC (K/uL)   Date Value   02/28/2024 7.36   02/28/2024 8.57   12/12/2023 3.96     Hemoglobin (g/dL)   Date Value   02/28/2024 9.6 (L)   02/28/2024 10.8 (L)   12/12/2023 10.6 (L)     Platelets (K/uL)   Date Value   02/28/2024 411   02/28/2024 431   12/12/2023 282       X-Ray Chest AP Portable  Narrative: EXAMINATION:  XR CHEST AP PORTABLE    CLINICAL HISTORY:  Sepsis;    FINDINGS:  Single view of the chest.  No comparison    Cardiac silhouette is normal.  Streaky opacity seen within the perihilar regions and lower lobes could reflect interstitial pneumonia or viral pneumonitis.  No consolidation..  No evidence of pleural effusion or pneumothorax.  Bones appear intact.  Moderate degenerative changes and moderate atherosclerotic disease.  Impression: See findings above.  Recommend follow-up    Electronically signed by: Caleb Sandoval MD  Date:    02/28/2024  Time:    13:58  CTA Chest Non-Coronary (PE Studies)  Narrative: EXAM: CTA of the chest with contrast, with 3-D reconstructions    HISTORY:  Pulmonary embolism (PE) suspected, unknown D-dimer; [R07.9]-Chest pain, unspecified.    TECHNIQUE: Routine CT angiogram of the chest was performed. 3-D reconstructions/reformats/MIPs obtained. All CT scans at [this location] are performed using dose modulation techniques as appropriate to a performed exam including the following: automated exposure control; adjustment of the mA and/or kV according to patient size (this includes techniques or standardized protocols for targeted exams where dose is matched to  indication / reason for exam; i.e. extremities or head); use of iterative reconstruction technique.    Comparison: 12/22/2023    FINDINGS:  There is satisfactory opacification of the pulmonary arteries. No intraluminal filling defects are identified to suggest pulmonary embolism.    There has been interval development of extensive multifocal groundglass infiltrates scattered in the bilateral upper lobes, lower lobes and right middle lobe.  2.6 x 1.4 cm spiculated nodule in the left upper lobe with surrounding scarring, previously 3.0 x 1.7 cm.  There are 2 nodular infiltrates in the right lung base.  There is bibasilar subsegmental dependent atelectasis in the posterior lung bases.  No pleural effusion or pneumothorax identified. No pathologically enlarged mediastinal or hilar lymph nodes are present. Heart size is within normal limits. There is no significant pericardial effusion. Negative for aortic aneurysm or dissection. No significant coronary artery calcifications are noted. No acute osseous abnormality.  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  No new bone lesions identified..  Limited images through the upper abdomen demonstrate no acute or suspicious abnormality.  Impression: 1.  No evidence of pulmonary embolism.  2.  Interval development of multifocal bilateral pulmonary groundglass infiltrates and right basilar nodular infiltrates most likely representing either atypical pneumonia or inflammatory pneumonitis.  3.  Slight interval decrease in size of the spiculated left upper lobe nodule compatible with patient's known history of lung cancer.  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    Finalized on: 2/28/2024 12:25 PM By:  Gerry Trejo MD  [facility]# [id number]      2024-02-28 12:27:49.832    [facility]       ASSESSMENT -- PLAN:   JACOBO COLEMAN is a 63 y.o. female with a relevant medical history of non-small cell lung cancer who is being treated for  CAP (community acquired pneumonia) with consideration of other atypical viral causes.    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        1.Community acquired pneumonia  Pt has completed oral course of doxycycline prior to this admission  -IV levofloxacin  -respiratory viral panel    2. Immunodeficiency due to chemotherapy and Malignant neoplasm of upper lobe of L lung  -continue current osimertinib regimen and consult Dr Cheney as needed    3. Hypothyroidism  -continue home levothyroxine    Discharge planning:   Dispo home once medically cleared, likely once sufficient IV antibiotic course completed and improvement in symptoms

## 2024-02-28 NOTE — PROGRESS NOTES
Subjective:       Patient ID: JACOBO COLEMAN is a 63 y.o. female.    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  Patient has had palliative radiation therapy to spine.  Patient states that over the last several days she is failed weak and short of breath.  She went to an urgent care does not know the name in the [place] area they do not do any imaging but gave her prednisone she is currently on tapering doses.  ECOG status 1    Past Medical History:   Diagnosis Date    Malignant neoplasm of upper lobe of left lung 2023    Thyroid disease      Family History   Problem Relation Age of Onset    Heart failure Mother     Diabetes Mother     Rheum arthritis Father     Diabetes Brother      Social History     Socioeconomic History    Marital status:    Tobacco Use    Smoking status: Former     Current packs/day: 0.00     Types: Cigarettes     Quit date:      Years since quittin.1     Passive exposure: Never    Smokeless tobacco: Never   Substance and Sexual Activity    Alcohol use: Yes     Alcohol/week: 2.0 standard drinks of alcohol     Types: 2 Glasses of wine per week     Comment: socially    Drug use: Not Currently    Sexual activity: Not Currently   Social History Narrative    ** Merged History Encounter **          Social Determinants of Health     Financial Resource Strain: Low Risk  (2023)    Overall Financial Resource Strain (CARDIA)     Difficulty of Paying Living Expenses: Not hard at all   Food Insecurity: No Food Insecurity (2023)    Hunger Vital Sign     Worried About Running Out of Food in the Last Year: Never true     Ran Out of Food in the Last Year: Never true   Transportation Needs: No Transportation Needs (2023)    PRAPARE -  Transportation     Lack of Transportation (Medical): No     Lack of Transportation (Non-Medical): No   Physical Activity: Sufficiently Active (12/25/2023)    Exercise Vital Sign     Days of Exercise per Week: 5 days     Minutes of Exercise per Session: 30 min   Stress: No Stress Concern Present (12/25/2023)    [employer] [place] - Occupational Stress Questionnaire     Feeling of Stress : Not at all   Social Connections: Unknown (12/25/2023)    Social Connection and Isolation Panel [NHANES]     Frequency of Communication with Friends and Family: More than three times a week     Frequency of Social Gatherings with Friends and Family: Once a week     Active Member of Clubs or Organizations: Yes     Attends Club or Organization Meetings: More than 4 times per year     Marital Status: Living with partner   Housing Stability: Low Risk  (12/25/2023)    Housing Stability Vital Sign     Unable to Pay for Housing in the Last Year: No     Number of Places Lived in the Last Year: 1     Unstable Housing in the Last Year: No     Past Surgical History:   Procedure Laterality Date    CHOLECYSTECTOMY      ENDOBRONCHIAL ULTRASOUND Left 1/5/2023    Procedure: ENDOBRONCHIAL ULTRASOUND (EBUS);  Surgeon: Bam Catalan MD;  Location: [facility] ENDO;  Service: Pulmonary;  Laterality: Left;    HYSTERECTOMY      NAVIGATIONAL BRONCHOSCOPY Left 1/5/2023    Procedure: BRONCHOSCOPY, NAVIGATIONAL;  Surgeon: Bam Catalan MD;  Location: [facility] ENDO;  Service: Pulmonary;  Laterality: Left;       Labs:  Lab Results   Component Value Date    WBC 8.57 02/28/2024    HGB 10.8 (L) 02/28/2024    HCT 34.3 (L) 02/28/2024    MCV 84 02/28/2024     02/28/2024     BMP  Lab Results   Component Value Date     02/28/2024    K 3.8 02/28/2024     02/28/2024    CO2 22 (L) 02/28/2024    BUN 22 02/28/2024    CREATININE 1.3 02/28/2024    CALCIUM 9.4 02/28/2024    ANIONGAP 9 02/28/2024     Lab Results   Component Value Date    ALT  "34 02/28/2024    AST 34 02/28/2024    ALKPHOS 92 02/28/2024    BILITOT 0.2 02/28/2024       No results found for: "IRON", "TIBC", "FERRITIN", "SATURATEDIRO"  No results found for: "LGGQKRNZ17"  No results found for: "FOLATE"  Lab Results   Component Value Date    TSH 1.436 12/12/2023         Review of Systems   Constitutional:  Negative for activity change, appetite change, chills, diaphoresis, fatigue, fever and unexpected weight change.   HENT:  Negative for congestion, dental problem, drooling, ear discharge, ear pain, facial swelling, hearing loss, mouth sores, nosebleeds, postnasal drip, rhinorrhea, sinus pressure, sneezing, sore throat, tinnitus, trouble swallowing and voice change.    Eyes:  Negative for photophobia, pain, discharge, redness, itching and visual disturbance.   Respiratory:  Positive for shortness of breath. Negative for cough, choking, chest tightness, wheezing and stridor.    Cardiovascular:  Negative for chest pain, palpitations and leg swelling.   Gastrointestinal:  Negative for abdominal distention, abdominal pain, anal bleeding, blood in stool, constipation, diarrhea, nausea, rectal pain and vomiting.   Endocrine: Negative for cold intolerance, heat intolerance, polydipsia, polyphagia and polyuria.   Genitourinary:  Negative for decreased urine volume, difficulty urinating, dyspareunia, dysuria, enuresis, flank pain, frequency, genital sores, hematuria, menstrual problem, pelvic pain, urgency, vaginal bleeding, vaginal discharge and vaginal pain.   Musculoskeletal:  Negative for arthralgias, back pain, gait problem, joint swelling, myalgias, neck pain and neck stiffness.   Skin:  Negative for color change, pallor and rash.   Allergic/Immunologic: Negative for environmental allergies, food allergies and immunocompromised state.   Neurological:  Negative for dizziness, tremors, seizures, syncope, facial asymmetry, speech difficulty, weakness, light-headedness, numbness and headaches. "   Hematological:  Negative for adenopathy. Does not bruise/bleed easily.   Psychiatric/Behavioral:  Positive for dysphoric mood. Negative for agitation, behavioral problems, confusion, decreased concentration, hallucinations, self-injury, sleep disturbance and suicidal ideas. The patient is not nervous/anxious and is not hyperactive.        Objective:      Physical Exam  Vitals reviewed.   Constitutional:       General: She is not in acute distress.     Appearance: Normal appearance. She is well-developed. She is not diaphoretic.   HENT:      Head: Normocephalic and atraumatic.      Right Ear: External ear normal.      Left Ear: External ear normal.      Nose: Nose normal.      Right Sinus: No maxillary sinus tenderness or frontal sinus tenderness.      Left Sinus: No maxillary sinus tenderness or frontal sinus tenderness.      Mouth/Throat:      Pharynx: No oropharyngeal exudate.   Eyes:      General: Lids are normal. No scleral icterus.        Right eye: No discharge.         Left eye: No discharge.      Conjunctiva/sclera: Conjunctivae normal.      Right eye: Right conjunctiva is not injected. No hemorrhage.     Left eye: Left conjunctiva is not injected. No hemorrhage.     Pupils: Pupils are equal, round, and reactive to light.   Neck:      Thyroid: No thyromegaly.      Vascular: No JVD.      Trachea: No tracheal deviation.   Cardiovascular:      Rate and Rhythm: Normal rate and regular rhythm.      Heart sounds: Normal heart sounds.   Pulmonary:      Effort: Pulmonary effort is normal. No respiratory distress.      Breath sounds: Normal breath sounds. No stridor.   Chest:      Chest wall: No tenderness.   Abdominal:      General: Bowel sounds are normal. There is no distension.      Palpations: Abdomen is soft. There is no hepatomegaly, splenomegaly or mass.      Tenderness: There is no abdominal tenderness. There is no rebound.   Musculoskeletal:         General: No tenderness. Normal range of motion.       Cervical back: Normal range of motion and neck supple.   Lymphadenopathy:      Cervical: No cervical adenopathy.      Upper Body:      Right upper body: No supraclavicular adenopathy.      Left upper body: No supraclavicular adenopathy.   Skin:     General: Skin is dry.      Findings: No erythema or rash.   Neurological:      Mental Status: She is alert and oriented to person, place, and time.      Cranial Nerves: No cranial nerve deficit.      Coordination: Coordination normal.   Psychiatric:         Behavior: Behavior normal.         Thought Content: Thought content normal.         Judgment: Judgment normal.             Assessment:      1. Malignant neoplasm of upper lobe of left lung    2. EGFR-related lung cancer    3. Immunodeficiency due to chemotherapy    4. Acute chest pain           Med Onc Chart Routing      Follow up with physician    Follow up with [facility]    Infusion scheduling note New or changed treatment      Injection scheduling note Zometa Q 3 months   Labs   Scheduling:  Preferred lab:  Lab interval:  D-dimer on today's labs   Imaging   Recommend CTA of lung rule out PE   Pharmacy appointment    Other referrals                   Plan:     Recent imaging demonstrated responsive disease but with shortness of breath in history of metastatic non-small cell lung carcinoma would recommend CTA of lung for possible pulmonary embolus.  D-dimer to be ordered communicate results through portal.  Otherwise have clinical follow-up EKG demonstrating no marked abnormalities on review.  Communicate results through portal if negative would recommend follow-up with me in 2 months.  Would do recommend that patient consider Zometa infusion.  Once patient has been cleared by dentist.  For prevention of deterioration with secondary disease in bone.  From my standpoint can have letter written to modify work schedule as needed  consulted; 1234 results of CTA of lung demonstrates no evidence of pulmonary  embolus but extensive bilateral interstitial infiltrates.  The patient was not tested for COVID are pneumonia at urgent care in [place] non [facility] affiliated will need to go to the emergency room for further evaluation patient informed        Zenon Cheney Jr, MD FACP

## 2024-02-28 NOTE — H&P
[facility] - Emergency Dept.  [facility] Medicine  History & Physical    Patient Name: JACOBO COLEMAN  MRN: [id number]  Patient Class: IP- Inpatient  Admission Date: 2/28/2024  Attending Physician: Rodríguez Mendoza MD  Primary Care Provider: Justine, Primary Doctor         Patient information was obtained from patient and ER records.     Subjective:     Principal Problem:CAP (community acquired pneumonia)    Chief Complaint:   Chief Complaint   Patient presents with    Shortness of Breath     Sent from [facility] for SOB and hypoxia with exertion. Also dx with bilat pneumonia. RA Sat 90% after walking to triage. Hx of metastatic lung CA. Currently taking doxycycline        HPI: Patient is a 63-year-old female with a PMH of metastatic lung cancer, MDD, GERD, hypothyroidism who presents to the ED with complaints of shortness of breath.  Patient reports symptoms started approximately 1 week ago.  She was seen at urgent care on 02/24 and was prescribed doxycycline and prednisone without improvement.  She was seen by Dr. Cheney today and CTA chest performed showed bilateral pneumonia.  Recommendations were made to go to the ED.    In the ED, patient was started on IV Levaquin.  H&H 9.6/30.3, D-dimer 1.84.  CTA was negative for PE.          Past Medical History:   Diagnosis Date    Malignant neoplasm of upper lobe of left lung 1/19/2023    Thyroid disease        Past Surgical History:   Procedure Laterality Date    CHOLECYSTECTOMY      ENDOBRONCHIAL ULTRASOUND Left 1/5/2023    Procedure: ENDOBRONCHIAL ULTRASOUND (EBUS);  Surgeon: Bam Catalan MD;  Location: [facility];  Service: Pulmonary;  Laterality: Left;    HYSTERECTOMY      NAVIGATIONAL BRONCHOSCOPY Left 1/5/2023    Procedure: BRONCHOSCOPY, NAVIGATIONAL;  Surgeon: Bam Catalan MD;  Location: [facility];  Service: Pulmonary;  Laterality: Left;       Review of patient's allergies indicates:   Allergen Reactions    Xylocaine with epinephrine [lidocaine-epinephrine] Anaphylaxis     Lipitor [atorvastatin] Other (See Comments)     Body aches      Methocarbamol-aspirin     Zetia [ezetimibe] Other (See Comments)     Muscle spasms      Augmentin [amoxicillin-pot clavulanate] Rash    Macrodantin [nitrofurantoin macrocrystal] Rash    Omnicef [cefdinir] Rash and Other (See Comments)     GI upset    Pravastatin Rash    Sulfa (sulfonamide antibiotics) Rash       Current Facility-Administered Medications on File Prior to Encounter   Medication    [COMPLETED] iohexoL (OMNIPAQUE 350) injection 100 mL     Current Outpatient Medications on File Prior to Encounter   Medication Sig    cycloSPORINE (RESTASIS) 0.05 % ophthalmic emulsion Place 1 drop into both eyes 2 (two) times a day.    FLUoxetine 20 MG capsule Take 20 mg by mouth once daily.    levothyroxine (SYNTHROID) 88 MCG tablet Take 88 mcg by mouth every evening.    osimertinib (TAGRISSO) 80 mg Tab Take 1 tab (80 mg) by mouth once daily.    pantoprazole (PROTONIX) 40 MG tablet Take 1 tablet by mouth every morning.    predniSONE (DELTASONE) 10 MG tablet Take 1 tablet (10 mg total) by mouth once daily.    albuterol (VENTOLIN HFA) 90 mcg/actuation inhaler Inhale 2 puffs into the lungs every 6 (six) hours as needed for Wheezing. Rescue    amlodipine-olmesartan (GREGORY) 5-20 mg per tablet Take 1 tablet by mouth once daily.    azelastine (ASTELIN) 137 mcg (0.1 %) nasal spray 1 spray by Nasal route 2 (two) times daily.    cetirizine (ZYRTEC) 10 MG tablet Take 5 mg by mouth once daily.    eszopiclone (LUNESTA) 3 mg Tab Take 3 mg by mouth every evening.    fluticasone propionate (FLONASE) 50 mcg/actuation nasal spray 1 spray by Each Nostril route 2 (two) times daily.    hydrOXYzine HCL (ATARAX) 25 MG tablet Take 25 mg by mouth once daily.    ibuprofen (ADVIL,MOTRIN) 200 MG tablet Take by mouth every 6 (six) hours as needed.    ipratropium (ATROVENT) 21 mcg (0.03 %) nasal spray SMARTSI Spray(s) Both Nares 2-3 Times Daily    multivitamin capsule Take 1 capsule by  mouth once daily.    mupirocin (BACTROBAN) 2 % ointment Apply topically 3 (three) times daily.    OLANZapine (ZYPREXA) 5 MG tablet Take 5 mg by mouth every evening.    ondansetron (ZOFRAN-ODT) 4 MG TbDL Take 2 mg by mouth every 6 (six) hours as needed.    promethazine (PHENERGAN) 25 MG tablet Take 25 mg by mouth every 6 (six) hours as needed.    varenicline 0.03 mg/spray sprm 1 spray by Each Nostril route 2 (two) times daily.    [DISCONTINUED] folic acid (FOLVITE) 1 MG tablet Take 1 tablet by mouth every morning.    [DISCONTINUED] hydrocortisone 2.5 % lotion Apply topically.    [DISCONTINUED] methylphenidate HCl 18 MG CR tablet Take 18 mg by mouth every morning.    [DISCONTINUED] oxyCODONE-acetaminophen (PERCOCET) 5-325 mg per tablet Take 1 tablet by mouth 2 (two) times daily as needed.     Family History       Problem Relation (Age of Onset)    Diabetes Mother, Brother    Heart failure Mother    Rheum arthritis Father          Tobacco Use    Smoking status: Former     Current packs/day: 0.00     Types: Cigarettes     Quit date:      Years since quittin.1     Passive exposure: Never    Smokeless tobacco: Never   Substance and Sexual Activity    Alcohol use: Yes     Alcohol/week: 2.0 standard drinks of alcohol     Types: 2 Glasses of wine per week     Comment: socially    Drug use: Not Currently    Sexual activity: Not Currently     Review of Systems   Constitutional:  Negative for fatigue and fever.   HENT:  Negative for sinus pressure.    Eyes:  Negative for visual disturbance.   Respiratory:  Positive for cough and shortness of breath.    Cardiovascular:  Negative for chest pain.   Gastrointestinal:  Negative for nausea and vomiting.   Genitourinary:  Negative for difficulty urinating.   Musculoskeletal:  Negative for back pain.   Skin:  Negative for rash.   Neurological:  Negative for headaches.   Psychiatric/Behavioral:  Negative for confusion.      Objective:     Vital Signs (Most Recent):  Temp: 98.5  °F (36.9 °C) (02/28/24 1335)  Pulse: 86 (02/28/24 1515)  Resp: 20 (02/28/24 1515)  BP: 135/65 (02/28/24 1515)  SpO2: (!) 94 % (02/28/24 1515) Vital Signs (24h Range):  Temp:  [97.9 °F (36.6 °C)-98.5 °F (36.9 °C)] 98.5 °F (36.9 °C)  Pulse:  [] 86  Resp:  [16-21] 20  SpO2:  [93 %-96 %] 94 %  BP: (133-146)/(65-87) 135/65     Weight: 78.7 kg (173 lb 8 oz)  Body mass index is 24.89 kg/m².     Physical Exam  Constitutional:       General: She is not in acute distress.     Appearance: She is well-developed. She is not diaphoretic.   HENT:      Head: Normocephalic and atraumatic.   Eyes:      Pupils: Pupils are equal, round, and reactive to light.   Cardiovascular:      Rate and Rhythm: Normal rate and regular rhythm.      Heart sounds: Normal heart sounds. No murmur heard.     No friction rub. No gallop.   Pulmonary:      Effort: Pulmonary effort is normal. No respiratory distress.      Breath sounds: No stridor. Rhonchi present. No wheezing or rales.   Abdominal:      General: Bowel sounds are normal. There is no distension.      Palpations: Abdomen is soft. There is no mass.      Tenderness: There is no abdominal tenderness. There is no guarding.   Musculoskeletal:      Right lower leg: No edema.      Left lower leg: No edema.   Skin:     General: Skin is warm.      Findings: No erythema.   Neurological:      Mental Status: She is alert and oriented to person, place, and time.              CRANIAL NERVES     CN III, IV, VI   Pupils are equal, round, and reactive to light.       Significant Labs:   Results for orders placed or performed during the hospital encounter of 02/28/24   Influenza A & B by Molecular    Specimen: Nasal Swab   Result Value Ref Range    Influenza A, Molecular Negative Negative    Influenza B, Molecular Negative Negative    Flu A & B Source Nasal swab    CBC auto differential   Result Value Ref Range    WBC 7.36 3.90 - 12.70 K/uL    RBC 3.63 (L) 4.00 - 5.40 M/uL    Hemoglobin 9.6 (L) 12.0 -  16.0 g/dL    Hematocrit 30.3 (L) 37.0 - 48.5 %    MCV 84 82 - 98 fL    MCH 26.4 (L) 27.0 - 31.0 pg    MCHC 31.7 (L) 32.0 - 36.0 g/dL    RDW 13.1 11.5 - 14.5 %    Platelets 411 150 - 450 K/uL    MPV 9.2 9.2 - 12.9 fL    Immature Granulocytes 0.8 (H) 0.0 - 0.5 %    Gran # (ANC) 6.6 1.8 - 7.7 K/uL    Immature Grans (Abs) 0.06 (H) 0.00 - 0.04 K/uL    Lymph # 0.4 (L) 1.0 - 4.8 K/uL    Mono # 0.3 0.3 - 1.0 K/uL    Eos # 0.0 0.0 - 0.5 K/uL    Baso # 0.03 0.00 - 0.20 K/uL    nRBC 0 0 /100 WBC    Gran % 89.5 (H) 38.0 - 73.0 %    Lymph % 4.8 (L) 18.0 - 48.0 %    Mono % 4.2 4.0 - 15.0 %    Eosinophil % 0.3 0.0 - 8.0 %    Basophil % 0.4 0.0 - 1.9 %    Differential Method Automated    Comprehensive metabolic panel   Result Value Ref Range    Sodium 136 136 - 145 mmol/L    Potassium 4.5 3.5 - 5.1 mmol/L    Chloride 107 95 - 110 mmol/L    CO2 18 (L) 23 - 29 mmol/L    Glucose 146 (H) 70 - 110 mg/dL    BUN 21 8 - 23 mg/dL    Creatinine 1.3 0.5 - 1.4 mg/dL    Calcium 9.1 8.7 - 10.5 mg/dL    Total Protein 7.4 6.0 - 8.4 g/dL    Albumin 3.1 (L) 3.5 - 5.2 g/dL    Total Bilirubin 0.2 0.1 - 1.0 mg/dL    Alkaline Phosphatase 91 55 - 135 U/L    AST 37 10 - 40 U/L    ALT 35 10 - 44 U/L    eGFR 46 (A) >60 mL/min/1.73 m^2    Anion Gap 11 8 - 16 mmol/L   Lactic acid, plasma #1   Result Value Ref Range    Lactate (Lactic Acid) 1.7 0.5 - 2.2 mmol/L   Urinalysis, Reflex to Urine Culture Urine, Clean Catch    Specimen: Urine   Result Value Ref Range    Specimen UA Urine, Clean Catch     Color, UA Yellow Yellow, Straw, Iliana    Appearance, UA Clear Clear    pH, UA 6.0 5.0 - 8.0    Specific Gravity, UA >1.030 (A) 1.005 - 1.030    Protein, UA Trace (A) Negative    Glucose, UA Negative Negative    Ketones, UA Negative Negative    Bilirubin (UA) Negative Negative    Occult Blood UA Negative Negative    Nitrite, UA Negative Negative    Urobilinogen, UA Negative <2.0 EU/dL    Leukocytes, UA 1+ (A) Negative   COVID-19 Rapid Screening   Result Value Ref  Range    SARS-CoV-2 RNA, Amplification, Qual Negative Negative   BNP   Result Value Ref Range    BNP 28 0 - 99 pg/mL   Troponin I   Result Value Ref Range    Troponin I <0.006 0.000 - 0.026 ng/mL   Procalcitonin   Result Value Ref Range    Procalcitonin 0.06 <0.25 ng/mL   Urinalysis Microscopic   Result Value Ref Range    RBC, UA 4 0 - 4 /hpf    WBC, UA 6 (H) 0 - 5 /hpf    Microscopic Comment SEE COMMENT    EKG 12-lead   Result Value Ref Range    QRS Duration 76 ms    OHS QTC Calculation 472 ms        Significant Imaging: X-Ray Chest AP Portable  Narrative: EXAMINATION:  XR CHEST AP PORTABLE    CLINICAL HISTORY:  Sepsis;    FINDINGS:  Single view of the chest.  No comparison    Cardiac silhouette is normal.  Streaky opacity seen within the perihilar regions and lower lobes could reflect interstitial pneumonia or viral pneumonitis.  No consolidation..  No evidence of pleural effusion or pneumothorax.  Bones appear intact.  Moderate degenerative changes and moderate atherosclerotic disease.  Impression: See findings above.  Recommend follow-up    Electronically signed by: Caleb Sandoval MD  Date:    02/28/2024  Time:    13:58  CTA Chest Non-Coronary (PE Studies)  Narrative: EXAM: CTA of the chest with contrast, with 3-D reconstructions    HISTORY:  Pulmonary embolism (PE) suspected, unknown D-dimer; [R07.9]-Chest pain, unspecified.    TECHNIQUE: Routine CT angiogram of the chest was performed. 3-D reconstructions/reformats/MIPs obtained. All CT scans at [this location] are performed using dose modulation techniques as appropriate to a performed exam including the following: automated exposure control; adjustment of the mA and/or kV according to patient size (this includes techniques or standardized protocols for targeted exams where dose is matched to indication / reason for exam; i.e. extremities or head); use of iterative reconstruction technique.    Comparison: 12/22/2023    FINDINGS:  There is satisfactory  opacification of the pulmonary arteries. No intraluminal filling defects are identified to suggest pulmonary embolism.    There has been interval development of extensive multifocal groundglass infiltrates scattered in the bilateral upper lobes, lower lobes and right middle lobe.  2.6 x 1.4 cm spiculated nodule in the left upper lobe with surrounding scarring, previously 3.0 x 1.7 cm.  There are 2 nodular infiltrates in the right lung base.  There is bibasilar subsegmental dependent atelectasis in the posterior lung bases.  No pleural effusion or pneumothorax identified. No pathologically enlarged mediastinal or hilar lymph nodes are present. Heart size is within normal limits. There is no significant pericardial effusion. Negative for aortic aneurysm or dissection. No significant coronary artery calcifications are noted. No acute osseous abnormality.  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  No new bone lesions identified..  Limited images through the upper abdomen demonstrate no acute or suspicious abnormality.  Impression: 1.  No evidence of pulmonary embolism.  2.  Interval development of multifocal bilateral pulmonary groundglass infiltrates and right basilar nodular infiltrates most likely representing either atypical pneumonia or inflammatory pneumonitis.  3.  Slight interval decrease in size of the spiculated left upper lobe nodule compatible with patient's known history of lung cancer.  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    Finalized on: 2/28/2024 12:25 PM By:  Gerry Trejo MD  BRRG# [id number]      2024-02-28 12:27:49.832    GINO      Assessment/Plan:     * CAP (community acquired pneumonia)  Bilateral pneumonia noted on CTA   Sputum culture   Patient immunocompromised   Given allergies will continue IV Levaquin           Immunodeficiency due to chemotherapy  Currently undergoing chemotherapy for metastatic lung cancer      Malignant  neoplasm of upper lobe of left lung  Being followed by hematology outpatient      Hypothyroidism  Synthroid      Hypercholesteremia  Continue statin        VTE Risk Mitigation (From admission, onward)      None                       Pharmacist Renal Dose Adjustment Note    JACOBO COLEMAN is a 63 y.o. female being treated with the medication levofloxacin.    Patient Data:    Vital Signs (Most Recent):  Temp: 98.5 °F (36.9 °C) (02/28/24 1335)  Pulse: 105 (02/28/24 1335)  Resp: (!) 21 (02/28/24 1335)  BP: (!) 146/87 (02/28/24 1335)  SpO2: (!) 93 % (02/28/24 1335) Vital Signs (72h Range):  Temp:  [97.9 °F (36.6 °C)-98.5 °F (36.9 °C)]   Pulse:  [104-105]   Resp:  [16-21]   BP: (137-146)/(77-87)   SpO2:  [93 %-96 %]      Recent Labs   Lab 02/28/24  1012   CREATININE 1.3     Serum creatinine: 1.3 mg/dL 02/28/24 1012  Estimated creatinine clearance: 47.3 mL/min    Medication: levofloxacin dose: 750 mg frequency every 24 hours will be changed to medication: levofloxacin dose: 750 mg frequency: every 48 hours, for CrCl 20-49 mL/min.    Pharmacist's Name: Shira Carlton      AdmissionCare    Guideline: Pneumonia - INPT, Inpatient    Based on the indications selected for the patient, the bed status of Inpatient was determined to be MET    The following indications were selected as present at the time of evaluation of the patient:      - Failure of outpatient treatment, as indicated by 1 or more of the following:    - Insufficient improvement (or worsening) of signs or symptoms despite adherence to appropriate outpatient regimen of sufficient duration   - Presence of risk factor for poor outcome (eg, gross hemoptysis, cavitary infiltrate, neuromuscular weakness, cystic fibrosis)    AdmissionCare documentation entered by: Rodríguez Mendoza    [facility] [place] [employer], 27th edition, Copyright © 2023 [facility] [place], [facility] All Rights Reserved.  [id number]:27:08-06:00    Rodríguez Mendoza MD  Department of Hospital Medicine  [facility] - Emergency Dept.

## 2024-02-28 NOTE — ADMISSIONCARE
AdmissionCare    Guideline: Pneumonia - INPT, Inpatient    Based on the indications selected for the patient, the bed status of Inpatient was determined to be MET    The following indications were selected as present at the time of evaluation of the patient:      - Failure of outpatient treatment, as indicated by 1 or more of the following:    - Insufficient improvement (or worsening) of signs or symptoms despite adherence to appropriate outpatient regimen of sufficient duration   - Presence of risk factor for poor outcome (eg, gross hemoptysis, cavitary infiltrate, neuromuscular weakness, cystic fibrosis)    AdmissionCare documentation entered by: Rodríguez Mendoza    [facility], 27th edition, Copyright © 2023 [facility], [facility] All Rights Reserved.  [id number]:27:08-06:00

## 2024-02-28 NOTE — FIRST PROVIDER EVALUATION
Medical screening examination initiated.  I have conducted a focused provider triage encounter, findings are as follows:    Brief history of present illness:  63-year-old female presents the emergency department for worsening pneumonia.  Patient reports she is currently on doxy and was sent to the ER for further evaluation    Vitals:    02/28/24 1335   BP: (!) 146/87   BP Location: Right arm   Patient Position: Sitting   Pulse: 105   Resp: (!) 21   Temp: 98.5 °F (36.9 °C)   TempSrc: Oral   SpO2: (!) 93%   Weight: 78.7 kg (173 lb 8 oz)       Pertinent physical exam:  Hypoxia    Brief workup plan:  Septic workup    Preliminary workup initiated; this workup will be continued and followed by the physician or advanced practice provider that is assigned to the patient when roomed.

## 2024-02-28 NOTE — HPI
Patient is a 63-year-old female with a PMH of metastatic lung cancer, MDD, GERD, hypothyroidism who presents to the ED with complaints of shortness of breath.  Patient reports symptoms started approximately 1 week ago.  She was seen at urgent care on 02/24 and was prescribed doxycycline and prednisone without improvement.  She was seen by Dr. Cheney today and CTA chest performed showed bilateral pneumonia.  Recommendations were made to go to the ED.    In the ED, patient was started on IV Levaquin.  H&H 9.6/30.3, D-dimer 1.84.  CTA was negative for PE.

## 2024-02-29 LAB
ANION GAP SERPL CALC-SCNC: 10 MMOL/L (ref 8–16)
BASOPHILS # BLD AUTO: 0.03 K/UL (ref 0–0.2)
BASOPHILS NFR BLD: 0.4 % (ref 0–1.9)
BUN SERPL-MCNC: 20 MG/DL (ref 8–23)
CALCIUM SERPL-MCNC: 8.2 MG/DL (ref 8.7–10.5)
CHLORIDE SERPL-SCNC: 109 MMOL/L (ref 95–110)
CO2 SERPL-SCNC: 21 MMOL/L (ref 23–29)
CREAT SERPL-MCNC: 1.1 MG/DL (ref 0.5–1.4)
DIFFERENTIAL METHOD BLD: ABNORMAL
EOSINOPHIL # BLD AUTO: 0.1 K/UL (ref 0–0.5)
EOSINOPHIL NFR BLD: 1.8 % (ref 0–8)
ERYTHROCYTE [DISTWIDTH] IN BLOOD BY AUTOMATED COUNT: 13.2 % (ref 11.5–14.5)
EST. GFR  (NO RACE VARIABLE): 56 ML/MIN/1.73 M^2
ESTIMATED AVG GLUCOSE: 134 MG/DL (ref 68–131)
GLUCOSE SERPL-MCNC: 99 MG/DL (ref 70–110)
HBA1C MFR BLD: 6.3 % (ref 4–5.6)
HCT VFR BLD AUTO: 29.1 % (ref 37–48.5)
HGB BLD-MCNC: 9.3 G/DL (ref 12–16)
IMM GRANULOCYTES # BLD AUTO: 0.05 K/UL (ref 0–0.04)
IMM GRANULOCYTES NFR BLD AUTO: 0.7 % (ref 0–0.5)
LYMPHOCYTES # BLD AUTO: 0.9 K/UL (ref 1–4.8)
LYMPHOCYTES NFR BLD: 12 % (ref 18–48)
MCH RBC QN AUTO: 26.6 PG (ref 27–31)
MCHC RBC AUTO-ENTMCNC: 32 G/DL (ref 32–36)
MCV RBC AUTO: 83 FL (ref 82–98)
MONOCYTES # BLD AUTO: 1 K/UL (ref 0.3–1)
MONOCYTES NFR BLD: 14.1 % (ref 4–15)
NEUTROPHILS # BLD AUTO: 5 K/UL (ref 1.8–7.7)
NEUTROPHILS NFR BLD: 71 % (ref 38–73)
NRBC BLD-RTO: 0 /100 WBC
PLATELET # BLD AUTO: 357 K/UL (ref 150–450)
PMV BLD AUTO: 9.1 FL (ref 9.2–12.9)
POTASSIUM SERPL-SCNC: 4.5 MMOL/L (ref 3.5–5.1)
RBC # BLD AUTO: 3.5 M/UL (ref 4–5.4)
SODIUM SERPL-SCNC: 140 MMOL/L (ref 136–145)
WBC # BLD AUTO: 7.1 K/UL (ref 3.9–12.7)

## 2024-02-29 PROCEDURE — 80048 BASIC METABOLIC PNL TOTAL CA: CPT | Performed by: FAMILY MEDICINE

## 2024-02-29 PROCEDURE — 25000242 PHARM REV CODE 250 ALT 637 W/ HCPCS: Performed by: FAMILY MEDICINE

## 2024-02-29 PROCEDURE — 36415 COLL VENOUS BLD VENIPUNCTURE: CPT | Performed by: FAMILY MEDICINE

## 2024-02-29 PROCEDURE — 63600175 PHARM REV CODE 636 W HCPCS: Performed by: FAMILY MEDICINE

## 2024-02-29 PROCEDURE — 25000242 PHARM REV CODE 250 ALT 637 W/ HCPCS: Performed by: HOSPITALIST

## 2024-02-29 PROCEDURE — 25000003 PHARM REV CODE 250: Performed by: FAMILY MEDICINE

## 2024-02-29 PROCEDURE — 21400001 HC TELEMETRY ROOM

## 2024-02-29 PROCEDURE — 85025 COMPLETE CBC W/AUTO DIFF WBC: CPT | Performed by: FAMILY MEDICINE

## 2024-02-29 PROCEDURE — 94640 AIRWAY INHALATION TREATMENT: CPT

## 2024-02-29 PROCEDURE — 94761 N-INVAS EAR/PLS OXIMETRY MLT: CPT

## 2024-02-29 RX ORDER — ESZOPICLONE 3 MG/1
3 TABLET, FILM COATED ORAL NIGHTLY
Status: DISCONTINUED | OUTPATIENT
Start: 2024-02-29 | End: 2024-03-01 | Stop reason: HOSPADM

## 2024-02-29 RX ORDER — ENOXAPARIN SODIUM 100 MG/ML
40 INJECTION SUBCUTANEOUS EVERY 24 HOURS
Status: DISCONTINUED | OUTPATIENT
Start: 2024-02-29 | End: 2024-03-01 | Stop reason: HOSPADM

## 2024-02-29 RX ORDER — IPRATROPIUM BROMIDE AND ALBUTEROL SULFATE 2.5; .5 MG/3ML; MG/3ML
3 SOLUTION RESPIRATORY (INHALATION)
Status: DISCONTINUED | OUTPATIENT
Start: 2024-02-29 | End: 2024-03-01 | Stop reason: HOSPADM

## 2024-02-29 RX ORDER — TEMAZEPAM 7.5 MG/1
7.5 CAPSULE ORAL NIGHTLY
Status: DISCONTINUED | OUTPATIENT
Start: 2024-02-29 | End: 2024-02-29

## 2024-02-29 RX ADMIN — ENOXAPARIN SODIUM 40 MG: 40 INJECTION SUBCUTANEOUS at 04:02

## 2024-02-29 RX ADMIN — PANTOPRAZOLE SODIUM 40 MG: 40 TABLET, DELAYED RELEASE ORAL at 06:02

## 2024-02-29 RX ADMIN — FLUOXETINE 20 MG: 20 CAPSULE ORAL at 10:02

## 2024-02-29 RX ADMIN — CETIRIZINE HYDROCHLORIDE 5 MG: 5 TABLET ORAL at 10:02

## 2024-02-29 RX ADMIN — PREDNISONE 10 MG: 10 TABLET ORAL at 10:02

## 2024-02-29 RX ADMIN — IPRATROPIUM BROMIDE AND ALBUTEROL SULFATE 3 ML: 2.5; .5 SOLUTION RESPIRATORY (INHALATION) at 08:02

## 2024-02-29 RX ADMIN — IPRATROPIUM BROMIDE AND ALBUTEROL SULFATE 3 ML: 2.5; .5 SOLUTION RESPIRATORY (INHALATION) at 02:02

## 2024-02-29 RX ADMIN — ESZOPICLONE 3 MG: 3 TABLET, FILM COATED ORAL at 09:02

## 2024-02-29 RX ADMIN — LEVOTHYROXINE SODIUM 88 MCG: 88 TABLET ORAL at 09:02

## 2024-02-29 RX ADMIN — IPRATROPIUM BROMIDE AND ALBUTEROL SULFATE 3 ML: 2.5; .5 SOLUTION RESPIRATORY (INHALATION) at 07:02

## 2024-02-29 NOTE — PROGRESS NOTES
[facility] Medicine  Progress Note    Patient Name: JACOBO COLEMAN  MRN: [id number]  Patient Class: IP- Inpatient   Admission Date: 2/28/2024  Length of Stay: 1 days  Attending Physician: Rodríguez Mendoza MD  Primary Care Provider: Justine, Primary Doctor        Subjective:     Principal Problem:CAP (community acquired pneumonia)        HPI:  Patient is a 63-year-old female with a PMH of metastatic lung cancer, MDD, GERD, hypothyroidism who presents to the ED with complaints of shortness of breath.  Patient reports symptoms started approximately 1 week ago.  She was seen at urgent care on 02/24 and was prescribed doxycycline and prednisone without improvement.  She was seen by Dr. Cheney today and CTA chest performed showed bilateral pneumonia.  Recommendations were made to go to the ED.    In the ED, patient was started on IV Levaquin.  H&H 9.6/30.3, D-dimer 1.84.  CTA was negative for PE.          Overview/Hospital Course:  2/29:  Patient reports shortness of breath and wheezing this a.m..  Improved with breathing treatment.  Will continue Levaquin.  Given immunocompromised status will need further inpatient monitoring.  Respiratory viral panel negative.  Possible DC tomorrow.    Interval History:  See above    Review of Systems   Constitutional:  Negative for fatigue and fever.   HENT:  Negative for sinus pressure.    Eyes:  Negative for visual disturbance.   Respiratory:  Positive for cough and shortness of breath.    Cardiovascular:  Negative for chest pain.   Gastrointestinal:  Negative for nausea and vomiting.   Genitourinary:  Negative for difficulty urinating.   Musculoskeletal:  Negative for back pain.   Skin:  Negative for rash.   Neurological:  Negative for headaches.   Psychiatric/Behavioral:  Negative for confusion.      Objective:     Vital Signs (Most Recent):  Temp: 98 °F (36.7 °C) (02/29/24 0738)  Pulse: 89 (02/29/24 0824)  Resp: 18 (02/29/24 0809)  BP: (!) 145/77 (02/29/24 0738)  SpO2:  (!) 92 % (02/29/24 0809) Vital Signs (24h Range):  Temp:  [97.9 °F (36.6 °C)-99 °F (37.2 °C)] 98 °F (36.7 °C)  Pulse:  [] 89  Resp:  [16-24] 18  SpO2:  [89 %-96 %] 92 %  BP: (125-148)/(62-87) 145/77     Weight: 81.9 kg (180 lb 8.9 oz)  Body mass index is 25.91 kg/m².    Intake/Output Summary (Last 24 hours) at 2/29/2024 0930  Last data filed at 2/28/2024 1943  Gross per 24 hour   Intake 304.87 ml   Output --   Net 304.87 ml         Physical Exam  Constitutional:       General: She is not in acute distress.     Appearance: She is well-developed. She is not diaphoretic.   HENT:      Head: Normocephalic and atraumatic.   Eyes:      Pupils: Pupils are equal, round, and reactive to light.   Cardiovascular:      Rate and Rhythm: Normal rate and regular rhythm.      Heart sounds: Normal heart sounds. No murmur heard.     No friction rub. No gallop.   Pulmonary:      Effort: Pulmonary effort is normal. No respiratory distress.      Breath sounds: No stridor. Rhonchi present. No wheezing or rales.   Abdominal:      General: Bowel sounds are normal. There is no distension.      Palpations: Abdomen is soft. There is no mass.      Tenderness: There is no abdominal tenderness. There is no guarding.   Musculoskeletal:      Right lower leg: No edema.      Left lower leg: No edema.   Skin:     General: Skin is warm.      Findings: No erythema.   Neurological:      Mental Status: She is alert and oriented to person, place, and time.             Significant Labs: All pertinent labs within the past 24 hours have been reviewed.    Significant Imaging: I have reviewed all pertinent imaging results/findings within the past 24 hours.    Assessment/Plan:      * CAP (community acquired pneumonia)  Bilateral pneumonia noted on CTA   Sputum culture   Patient immunocompromised   Continue Levaquin  Will need further inpatient monitoring for stability  Possible DC tomorrow        Immunodeficiency due to chemotherapy  Currently undergoing  chemotherapy for metastatic lung cancer      Malignant neoplasm of upper lobe of left lung  Being followed by hematology outpatient      Hypothyroidism  Synthroid      Hypercholesteremia  Continue statin      Controlled type 2 diabetes mellitus without complication, without long-term current use of insulin  A1c 6.3   Will discontinue Accu-Cheks      VTE Risk Mitigation (From admission, onward)           Ordered     enoxaparin injection 40 mg  Every 24 hours         02/29/24 0931                    Discharge Planning   ISABELLE:      Code Status: DNR   Is the patient medically ready for discharge?:     Reason for patient still in hospital (select all that apply): Patient trending condition                     Rodríguez Mendoza MD  Department of Hospital Medicine   [facility] - Telemetry ([facility])

## 2024-02-29 NOTE — NURSING TRANSFER
Nursing Transfer Note      2/28/2024   1740    Nurse giving handoff:Diana  Nurse receiving handoff:Raphael    Reason patient is being transferred: continued monitoring and testing    Transfer to telemetry bed 234 from ED    Transfer via bed    Transfer with cardiac monitoring    Transported by Diana    Transfer Vital Signs:  Blood Pressure:130/78  Heart Rate:94  O2:93  Temperature:97.9  Respirations:24    Telemetry: Box Number [id number], Rate 94, and Rhythm NSR  Order for Tele Monitor? Yes    Additional Lines: N/A    4eyes on Skin: yes    Medicines sent: N/A    Any special needs or follow-up needed: N/A    Patient belongings transferred with patient: Yes    Chart send with patient: Yes    Notified: patient notified family    Patient reassessed at:   2/28/24 at 1740  Upon arrival to floor: cardiac monitor applied, patient oriented to room, call bell in reach, and bed in lowest position

## 2024-02-29 NOTE — PLAN OF CARE
A234/A234 RADHA COLEMAN is a 63 y.o.female admitted on 2/28/2024 for CAP (community acquired pneumonia)   Code Status: DNR MRN: [id number]   Review of patient's allergies indicates:   Allergen Reactions    Xylocaine with epinephrine [lidocaine-epinephrine] Anaphylaxis    Lipitor [atorvastatin] Other (See Comments)     Body aches      Methocarbamol-aspirin     Zetia [ezetimibe] Other (See Comments)     Muscle spasms      Augmentin [amoxicillin-pot clavulanate] Rash    Macrodantin [nitrofurantoin macrocrystal] Rash    Omnicef [cefdinir] Rash and Other (See Comments)     GI upset    Pravastatin Rash    Sulfa (sulfonamide antibiotics) Rash     Past Medical History:   Diagnosis Date    Malignant neoplasm of upper lobe of left lung 1/19/2023    Thyroid disease       PRN meds    acetaminophen, 650 mg, Q6H PRN  albuterol-ipratropium, 3 mL, Q6H PRN  dextrose 10%, 12.5 g, PRN  dextrose 10%, 25 g, PRN  glucagon (human recombinant), 1 mg, PRN  glucose, 16 g, PRN  glucose, 24 g, PRN  hydrALAZINE, 10 mg, Q6H PRN  insulin aspart U-100, 0-5 Units, QID (AC + HS) PRN  ondansetron, 4 mg, Q6H PRN  promethazine-codeine 6.25-10 mg/5 ml, 5 mL, Q6H PRN  traMADoL, 50 mg, Q6H PRN      Chart check completed. Will continue plan of care.      Orientation: oriented x 4  [place] Coma Scale Score: 15     Lead Monitored: Lead II Rhythm: normal sinus rhythm    Cardiac/Telemetry Box Number: [id number]    Last Bowel Movement: 02/28/24  Diet diabetic 2000 Calorie     Reginaldo Score: 21  Fall Risk Score: 6  Accucheck [x]   Freq? AC/HS     Lines/Drains/Airways       Peripheral Intravenous Line  Duration                  Peripheral IV - Single Lumen 02/28/24 1405 20 G Left Antecubital <1 day

## 2024-02-29 NOTE — ASSESSMENT & PLAN NOTE
Bilateral pneumonia noted on CTA   Sputum culture   Patient immunocompromised   Continue Levaquin  Will need further inpatient monitoring for stability  Possible DC tomorrow

## 2024-02-29 NOTE — PLAN OF CARE
[facility] - Telemetry (Hospital)  Initial Discharge Assessment       Primary Care Provider: No, Primary Doctor    Admission Diagnosis: History of lung cancer [Z85.118]  Sepsis [A41.9]  Pneumonia of both lower lobes due to infectious organism [J18.9]  Immunocompromised state due to drug therapy [D84.821, Z79.899]    Admission Date: 2/28/2024  Expected Discharge Date:     Transition of Care Barriers: None    Payor: BLUE CROSS BLUE SHIELD / Plan: BCBS BLUE SAVER PPO - HD / Product Type: PPO /     Extended Emergency Contact Information  Primary Emergency Contact: Ransom,  Address: [address] [place] [address] [address] [place]  Mobile Phone: [phone]  Relation: Significant other  Secondary Emergency Contact: Jose Rojas  Address: [address] [place] [address] [place]  Mobile Phone: [phone]  Relation: Son    Discharge Plan A: Home, Home with family  Discharge Plan B: Home, Home with family      [id number] [address] [facility]  [address] [facility] [place] [facility]"" [address]  Phone: [phone] Fax: [phone]    [facility] [address] [place] [address] [id number] [place] [address]  [address] [facility] [address]  Phone: [phone] Fax: [phone]      Initial Assessment (most recent)       Adult Discharge Assessment - 02/29/24 1534          Discharge Assessment    Assessment Type Discharge Planning Assessment     Confirmed/corrected address, phone number and insurance Yes     Confirmed Demographics Correct on Facesheet     Source of Information patient     Communicated ISABELLE with patient/caregiver Date not available/Unable to determine     Reason For Admission CAP     People in Home significant other     Facility Arrived From: Home     Do you expect to return to your current living situation? Yes     Do you have help at home or someone to help you manage your care at home? Yes     Who are your caregiver(s) and their phone number(s)? Pt's s/o, Ed     Prior  to hospitilization cognitive status: Alert/Oriented     Current cognitive status: Alert/Oriented     Walking or Climbing Stairs Difficulty no     Dressing/Bathing Difficulty no     Equipment Currently Used at Home none     Readmission within 30 days? No     Patient currently being followed by outpatient case management? No     Do you currently have service(s) that help you manage your care at home? No     Do you take prescription medications? Yes     Do you have prescription coverage? Yes     Do you have any problems affording any of your prescribed medications? No     Is the patient taking medications as prescribed? yes     Who is going to help you get home at discharge? Pts s/o     How do you get to doctors appointments? car, drives self;family or friend will provide     Are you on dialysis? No     Do you take coumadin? No     Discharge Plan A Home;Home with family     Discharge Plan B Home;Home with family     DME Needed Upon Discharge  none     Discharge Plan discussed with: Patient     Transition of Care Barriers None                   SW met with patient at bedside to complete discharge assessment. Pt reports living at home with s/o, Ed. Ed is pt's help at home and discharge transportation. Pt reports having DME at home if needed. No additional needs expressed during assessment.  Pt's whiteboard updated with CM contact and anticipated discharge disposition. SW to remain available as needed.

## 2024-02-29 NOTE — SUBJECTIVE & OBJECTIVE
Interval History:  See above    Review of Systems   Constitutional:  Negative for fatigue and fever.   HENT:  Negative for sinus pressure.    Eyes:  Negative for visual disturbance.   Respiratory:  Positive for cough and shortness of breath.    Cardiovascular:  Negative for chest pain.   Gastrointestinal:  Negative for nausea and vomiting.   Genitourinary:  Negative for difficulty urinating.   Musculoskeletal:  Negative for back pain.   Skin:  Negative for rash.   Neurological:  Negative for headaches.   Psychiatric/Behavioral:  Negative for confusion.      Objective:     Vital Signs (Most Recent):  Temp: 98 °F (36.7 °C) (02/29/24 0738)  Pulse: 89 (02/29/24 0824)  Resp: 18 (02/29/24 0809)  BP: (!) 145/77 (02/29/24 0738)  SpO2: (!) 92 % (02/29/24 0809) Vital Signs (24h Range):  Temp:  [97.9 °F (36.6 °C)-99 °F (37.2 °C)] 98 °F (36.7 °C)  Pulse:  [] 89  Resp:  [16-24] 18  SpO2:  [89 %-96 %] 92 %  BP: (125-148)/(62-87) 145/77     Weight: 81.9 kg (180 lb 8.9 oz)  Body mass index is 25.91 kg/m².    Intake/Output Summary (Last 24 hours) at 2/29/2024 0930  Last data filed at 2/28/2024 1943  Gross per 24 hour   Intake 304.87 ml   Output --   Net 304.87 ml         Physical Exam  Constitutional:       General: She is not in acute distress.     Appearance: She is well-developed. She is not diaphoretic.   HENT:      Head: Normocephalic and atraumatic.   Eyes:      Pupils: Pupils are equal, round, and reactive to light.   Cardiovascular:      Rate and Rhythm: Normal rate and regular rhythm.      Heart sounds: Normal heart sounds. No murmur heard.     No friction rub. No gallop.   Pulmonary:      Effort: Pulmonary effort is normal. No respiratory distress.      Breath sounds: No stridor. Rhonchi present. No wheezing or rales.   Abdominal:      General: Bowel sounds are normal. There is no distension.      Palpations: Abdomen is soft. There is no mass.      Tenderness: There is no abdominal tenderness. There is no guarding.    Musculoskeletal:      Right lower leg: No edema.      Left lower leg: No edema.   Skin:     General: Skin is warm.      Findings: No erythema.   Neurological:      Mental Status: She is alert and oriented to person, place, and time.             Significant Labs: All pertinent labs within the past 24 hours have been reviewed.    Significant Imaging: I have reviewed all pertinent imaging results/findings within the past 24 hours.

## 2024-02-29 NOTE — HOSPITAL COURSE
Patient was admitted for shortness of breath secondary to pneumonia.  She was started on empiric Levaquin.  Breathing treatments ordered for shortness of breath.  Patient clinically improved.  Will continue Levaquin daily as renal functions improved.  Patient was discharged home.  Recommended outpatient follow-up with PCP.  Repeat chest x-ray in 2-3 weeks.

## 2024-02-29 NOTE — PLAN OF CARE
A234/A234 RADHA COLEMAN is a 63 y.o.female admitted on 2/28/2024 for CAP (community acquired pneumonia)   Code Status: DNR MRN: [id number]   Review of patient's allergies indicates:   Allergen Reactions    Xylocaine with epinephrine [lidocaine-epinephrine] Anaphylaxis    Lipitor [atorvastatin] Other (See Comments)     Body aches      Methocarbamol-aspirin     Zetia [ezetimibe] Other (See Comments)     Muscle spasms      Augmentin [amoxicillin-pot clavulanate] Rash    Macrodantin [nitrofurantoin macrocrystal] Rash    Omnicef [cefdinir] Rash and Other (See Comments)     GI upset    Pravastatin Rash    Sulfa (sulfonamide antibiotics) Rash     Past Medical History:   Diagnosis Date    Malignant neoplasm of upper lobe of left lung 1/19/2023    Thyroid disease       PRN meds    acetaminophen, 650 mg, Q6H PRN  dextrose 10%, 12.5 g, PRN  dextrose 10%, 25 g, PRN  glucagon (human recombinant), 1 mg, PRN  glucose, 16 g, PRN  glucose, 24 g, PRN  hydrALAZINE, 10 mg, Q6H PRN  insulin aspart U-100, 0-5 Units, QID (AC + HS) PRN  ondansetron, 4 mg, Q6H PRN  promethazine-codeine 6.25-10 mg/5 ml, 5 mL, Q6H PRN  traMADoL, 50 mg, Q6H PRN      Awaiting sputum sample; patient and night shift nurse aware. Chart check completed. Will continue plan of care.      Orientation: oriented x 4  [place] Coma Scale Score: 15     Lead Monitored: Lead II Rhythm: normal sinus rhythm    Cardiac/Telemetry Box Number: [id number]    Last Bowel Movement: 02/27/24  Diet diabetic 2000 Calorie     Reginaldo Score: 23  Fall Risk Score: 3  Accucheck [x]   Freq?      Lines/Drains/Airways       Peripheral Intravenous Line  Duration                  Peripheral IV - Single Lumen 02/28/24 1405 20 G Left Antecubital <1 day                       Problem: Adult Inpatient Plan of Care  Goal: Plan of Care Review  Outcome: Ongoing, Progressing  Goal: Patient-Specific Goal (Individualized)  Outcome: Ongoing, Progressing  Goal: Absence of Hospital-Acquired Illness or  Injury  Outcome: Ongoing, Progressing  Goal: Optimal Comfort and Wellbeing  Outcome: Ongoing, Progressing  Goal: Readiness for Transition of Care  Outcome: Ongoing, Progressing

## 2024-03-01 VITALS
OXYGEN SATURATION: 93 % | BODY MASS INDEX: 25.88 KG/M2 | WEIGHT: 180.75 LBS | TEMPERATURE: 99 F | HEART RATE: 89 BPM | HEIGHT: 70 IN | RESPIRATION RATE: 18 BRPM | SYSTOLIC BLOOD PRESSURE: 126 MMHG | DIASTOLIC BLOOD PRESSURE: 61 MMHG

## 2024-03-01 LAB
ANION GAP SERPL CALC-SCNC: 11 MMOL/L (ref 8–16)
BASOPHILS # BLD AUTO: 0.03 K/UL (ref 0–0.2)
BASOPHILS NFR BLD: 0.5 % (ref 0–1.9)
BUN SERPL-MCNC: 18 MG/DL (ref 8–23)
CALCIUM SERPL-MCNC: 8.6 MG/DL (ref 8.7–10.5)
CHLORIDE SERPL-SCNC: 108 MMOL/L (ref 95–110)
CO2 SERPL-SCNC: 22 MMOL/L (ref 23–29)
CREAT SERPL-MCNC: 1.1 MG/DL (ref 0.5–1.4)
DIFFERENTIAL METHOD BLD: ABNORMAL
EOSINOPHIL # BLD AUTO: 0.2 K/UL (ref 0–0.5)
EOSINOPHIL NFR BLD: 3.4 % (ref 0–8)
ERYTHROCYTE [DISTWIDTH] IN BLOOD BY AUTOMATED COUNT: 13.2 % (ref 11.5–14.5)
EST. GFR  (NO RACE VARIABLE): 56 ML/MIN/1.73 M^2
GLUCOSE SERPL-MCNC: 87 MG/DL (ref 70–110)
HCT VFR BLD AUTO: 30.6 % (ref 37–48.5)
HGB BLD-MCNC: 9.5 G/DL (ref 12–16)
IMM GRANULOCYTES # BLD AUTO: 0.04 K/UL (ref 0–0.04)
IMM GRANULOCYTES NFR BLD AUTO: 0.7 % (ref 0–0.5)
LYMPHOCYTES # BLD AUTO: 0.8 K/UL (ref 1–4.8)
LYMPHOCYTES NFR BLD: 12.6 % (ref 18–48)
MCH RBC QN AUTO: 26 PG (ref 27–31)
MCHC RBC AUTO-ENTMCNC: 31 G/DL (ref 32–36)
MCV RBC AUTO: 84 FL (ref 82–98)
MONOCYTES # BLD AUTO: 0.8 K/UL (ref 0.3–1)
MONOCYTES NFR BLD: 12.4 % (ref 4–15)
NEUTROPHILS # BLD AUTO: 4.3 K/UL (ref 1.8–7.7)
NEUTROPHILS NFR BLD: 70.4 % (ref 38–73)
NRBC BLD-RTO: 0 /100 WBC
PLATELET # BLD AUTO: 401 K/UL (ref 150–450)
PMV BLD AUTO: 9.1 FL (ref 9.2–12.9)
POTASSIUM SERPL-SCNC: 4.5 MMOL/L (ref 3.5–5.1)
RBC # BLD AUTO: 3.65 M/UL (ref 4–5.4)
SODIUM SERPL-SCNC: 141 MMOL/L (ref 136–145)
WBC # BLD AUTO: 6.12 K/UL (ref 3.9–12.7)

## 2024-03-01 PROCEDURE — 80048 BASIC METABOLIC PNL TOTAL CA: CPT | Performed by: FAMILY MEDICINE

## 2024-03-01 PROCEDURE — 63600175 PHARM REV CODE 636 W HCPCS: Performed by: FAMILY MEDICINE

## 2024-03-01 PROCEDURE — 25000242 PHARM REV CODE 250 ALT 637 W/ HCPCS: Performed by: FAMILY MEDICINE

## 2024-03-01 PROCEDURE — 63600175 PHARM REV CODE 636 W HCPCS: Performed by: EMERGENCY MEDICINE

## 2024-03-01 PROCEDURE — 36415 COLL VENOUS BLD VENIPUNCTURE: CPT | Performed by: FAMILY MEDICINE

## 2024-03-01 PROCEDURE — 94640 AIRWAY INHALATION TREATMENT: CPT

## 2024-03-01 PROCEDURE — 85025 COMPLETE CBC W/AUTO DIFF WBC: CPT | Performed by: FAMILY MEDICINE

## 2024-03-01 PROCEDURE — 94761 N-INVAS EAR/PLS OXIMETRY MLT: CPT

## 2024-03-01 PROCEDURE — 25000003 PHARM REV CODE 250: Performed by: FAMILY MEDICINE

## 2024-03-01 RX ORDER — LEVOFLOXACIN 5 MG/ML
750 INJECTION, SOLUTION INTRAVENOUS
Status: DISCONTINUED | OUTPATIENT
Start: 2024-03-02 | End: 2024-03-01 | Stop reason: HOSPADM

## 2024-03-01 RX ORDER — PROMETHAZINE HYDROCHLORIDE AND DEXTROMETHORPHAN HYDROBROMIDE 6.25; 15 MG/5ML; MG/5ML
5 SYRUP ORAL EVERY 4 HOURS PRN
Qty: 120 ML | Refills: 0 | Status: SHIPPED | OUTPATIENT
Start: 2024-03-01 | End: 2024-03-11

## 2024-03-01 RX ORDER — LEVOFLOXACIN 750 MG/1
750 TABLET ORAL DAILY
Qty: 5 TABLET | Refills: 0 | Status: SHIPPED | OUTPATIENT
Start: 2024-03-01 | End: 2024-03-05 | Stop reason: SDUPTHER

## 2024-03-01 RX ADMIN — PANTOPRAZOLE SODIUM 40 MG: 40 TABLET, DELAYED RELEASE ORAL at 06:03

## 2024-03-01 RX ADMIN — IPRATROPIUM BROMIDE AND ALBUTEROL SULFATE 3 ML: 2.5; .5 SOLUTION RESPIRATORY (INHALATION) at 01:03

## 2024-03-01 RX ADMIN — PREDNISONE 10 MG: 10 TABLET ORAL at 08:03

## 2024-03-01 RX ADMIN — LEVOFLOXACIN 750 MG: 750 INJECTION, SOLUTION INTRAVENOUS at 02:03

## 2024-03-01 RX ADMIN — ACETAMINOPHEN 650 MG: 325 TABLET ORAL at 11:03

## 2024-03-01 RX ADMIN — CETIRIZINE HYDROCHLORIDE 5 MG: 5 TABLET ORAL at 08:03

## 2024-03-01 RX ADMIN — IPRATROPIUM BROMIDE AND ALBUTEROL SULFATE 3 ML: 2.5; .5 SOLUTION RESPIRATORY (INHALATION) at 08:03

## 2024-03-01 RX ADMIN — FLUOXETINE 20 MG: 20 CAPSULE ORAL at 08:03

## 2024-03-01 NOTE — DISCHARGE SUMMARY
[facility] Medicine  Discharge Summary      Patient Name: JACOBO COLEMAN  MRN: [id number]  NAKUL: [id number]  Patient Class: IP- Inpatient  Admission Date: 2/28/2024  Hospital Length of Stay: 2 days  Discharge Date and Time:  03/01/2024 11:45 AM  Attending Physician: Rodríguez Mendoza MD   Discharging Provider: Rodríguez Mendoza MD  Primary Care Provider: Justine, Primary Doctor    Primary Care Team: Networked reference to record PCT     HPI:   Patient is a 63-year-old female with a PMH of metastatic lung cancer, MDD, GERD, hypothyroidism who presents to the ED with complaints of shortness of breath.  Patient reports symptoms started approximately 1 week ago.  She was seen at urgent care on 02/24 and was prescribed doxycycline and prednisone without improvement.  She was seen by Dr. Cheney today and CTA chest performed showed bilateral pneumonia.  Recommendations were made to go to the ED.    In the ED, patient was started on IV Levaquin.  H&H 9.6/30.3, D-dimer 1.84.  CTA was negative for PE.          * No surgery found *      Hospital Course:   Patient was admitted for shortness of breath secondary to pneumonia.  She was started on empiric Levaquin.  Breathing treatments ordered for shortness of breath.  Patient clinically improved.  Will continue Levaquin daily as renal functions improved.  Patient was discharged home.  Recommended outpatient follow-up with PCP.  Repeat chest x-ray in 2-3 weeks.       Goals of Care Treatment Preferences:  Code Status: DNR    Health care agent: Sukhi Adrian: Best Friend: [phone] Kevin Zambrano: Partner: [phone]  Health care agent number: No value filed.    Living Will: Yes     What is most important right now is to focus on remaining as independent as possible, symptom/pain control, quality of life, even if it means sacrificing a little time.  Accordingly, we have decided that the best plan to meet the patient's goals includes continuing with treatment.      Consults:      No new Assessment & Plan notes have been filed under this hospital service since the last note was generated.  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 Date: 03/01/25     Order Specific Question Answer Comments   May the Radiologist modify the order per protocol to meet the clinical needs of the patient? Yes    Release to patient Immediate      Ambulatory referral/consult to Family Practice   Standing Status: Future   Referral Priority: Routine Referral Type: Consultation   Referral Reason: Specialty Services Required   Requested Specialty: Family Medicine   Number of Visits Requested: 1       Significant Diagnostic Studies: N/A    Pending Diagnostic Studies:       None           Medications:  Reconciled Home Medications:      Medication List        START taking these medications      levoFLOXacin 750 MG tablet  Commonly known as: LEVAQUIN  Take 1 tablet (750 mg total) by mouth once daily. for 5 days     promethazine-dextromethorphan 6.25-15 mg/5 mL Syrp  Commonly known as: PROMETHAZINE-DM  Take 5 mLs by mouth every 4 (four) hours as needed.            CONTINUE taking these medications      albuterol 90 mcg/actuation inhaler  Commonly known as: VENTOLIN HFA  Inhale 2 puffs into the lungs every 6 (six) hours  as needed for Wheezing. Rescue     azelastine 137 mcg (0.1 %) nasal spray  Commonly known as: ASTELIN  1 spray by Nasal route 2 (two) times daily.     cetirizine 10 MG tablet  Commonly known as: ZYRTEC  Take 5 mg by mouth once daily.     cycloSPORINE 0.05 % ophthalmic emulsion  Commonly known as: RESTASIS  Place 1 drop into both eyes 2 (two) times a day.     eszopiclone 3 mg Tab  Commonly known as: LUNESTA  Take 3 mg by mouth every evening.     FLUoxetine 20 MG capsule  Take 20 mg by mouth once daily.     fluticasone propionate 50 mcg/actuation nasal spray  Commonly known as: FLONASE  1 spray by Each Nostril route 2 (two) times daily.     hydrOXYzine HCL 25 MG tablet  Commonly known as: ATARAX  Take 25 mg by mouth once daily.     ibuprofen 200 MG tablet  Commonly known as: ADVIL,MOTRIN  Take by mouth every 6 (six) hours as needed.     ipratropium 21 mcg (0.03 %) nasal spray  Commonly known as: ATROVENT  SMARTSI Spray(s) Both Nares 2-3 Times Daily     levothyroxine 88 MCG tablet  Commonly known as: SYNTHROID  Take 88 mcg by mouth every evening.     multivitamin capsule  Take 1 capsule by mouth once daily.     mupirocin 2 % ointment  Commonly known as: BACTROBAN  Apply topically 3 (three) times daily.     OLANZapine 5 MG tablet  Commonly known as: ZyPREXA  Take 5 mg by mouth every evening.     ondansetron 4 MG Tbdl  Commonly known as: ZOFRAN-ODT  Take 2 mg by mouth every 6 (six) hours as needed.     pantoprazole 40 MG tablet  Commonly known as: PROTONIX  Take 1 tablet by mouth every morning.     promethazine 25 MG tablet  Commonly known as: PHENERGAN  Take 25 mg by mouth every 6 (six) hours as needed.     TAGRISSO 80 mg Tab  Generic drug: osimertinib  Take 1 tab (80 mg) by mouth once daily.     varenicline 0.03 mg/spray Sprm  1 spray by Each Nostril route 2 (two) times daily.            STOP taking these medications      amlodipine-olmesartan 5-20 mg per tablet  Commonly known as: GREGORY     predniSONE 10 MG  tablet  Commonly known as: DELTASONE              Indwelling Lines/Drains at time of discharge:   Lines/Drains/Airways       None                   Time spent on the discharge of patient: 37 minutes         Rodríguez Mendoza MD  Department of Hospital Medicine  [facility] - Telemetry ([facility])

## 2024-03-01 NOTE — PROGRESS NOTES
Pharmacist Renal Dose Adjustment Note    JACOBO COLEMAN is a 63 y.o. female being treated with the medication levofloxacin.    Patient Data:    Vital Signs (Most Recent):  Temp: 99.1 °F (37.3 °C) (03/01/24 1300)  Pulse: 78 (03/01/24 1323)  Resp: 18 (03/01/24 1323)  BP: 126/61 (03/01/24 1155)  SpO2: (!) 93 % (03/01/24 1323) Vital Signs (72h Range):  Temp:  [97.6 °F (36.4 °C)-101 °F (38.3 °C)]   Pulse:  []   Resp:  [16-24]   BP: (116-150)/(57-87)   SpO2:  [89 %-96 %]      Recent Labs   Lab 02/28/24  1404 02/29/24  0415 03/01/24  0415   CREATININE 1.3 1.1 1.1     Serum creatinine: 1.1 mg/dL 03/01/24 0415  Estimated creatinine clearance: 55.9 mL/min    Medication: levofloxacin dose: 750 mg frequency every 48 hours will be changed to medication: levofloxacin dose: 750 mg frequency: every 24 hours, for CrCl > 49 mL/min.    Pharmacist's Name: Shira Carlton

## 2024-03-01 NOTE — NURSING
Discharge instructions reviewed with patient and she verbalized understanding. PIV intact on removal. Tele monitor returned to monitor tech. AVS reviewed/given to pt. Prescriptions delivered bedside by [facility]. Home prescription Lunesta 10 pills returned to patient - patient verified correct amount of pills in bottle. All belongings packed by patient. Denies further needs. Family to transport patient home.

## 2024-03-01 NOTE — PLAN OF CARE
[facility] - Telemetry (Hospital)  Discharge Final Note    Primary Care Provider: Tom Huang    Expected Discharge Date: 3/1/2024    Final Discharge Note (most recent)       Final Note - 03/01/24 1245          Final Note    Assessment Type Final Discharge Note     Anticipated Discharge Disposition Home or Self Care        Post-Acute Status    Discharge Delays None known at this time                   Pt to discharge home today. Pt to arrange non-[facility] PCP follow up.   No CM needs/consults for discharge.    Important Message from Medicare             Contact Info       Ra Rivero MD    Address: [address], [place] [address]  Phone: [phone]       Next Steps: Follow up    Instructions: PCP follow up

## 2024-03-01 NOTE — MEDICAL/APP STUDENT
Hospital Medicine Student   Progress Note  Networked reference to record PCT     Admit Date: 2/28/2024  Hospital Day: 2  03/01/2024  12:03 PM    SUBJECTIVE:   Ms. JACOBO COLEMAN is a 63 y.o. female with a relevant medical history of metastatic lung cancer, MDD, GERD, hypothyroidism who is being followed up for CAP (community acquired pneumonia).    Interval history:   Pt slept well overnight and reports improvement in symptoms. She experiences some SOB when walking to the bathroom but SOB when talking and lying flat has improved. Also noted energy levels and weakness improved. Cough and clear sputum production continuing but gradually improving. New complaint of feeling a yeast infection beginning, which is typical for her with antibiotic usage. Pt is happy to d/c home today as she feels capable of managing current symptoms with oral antibiotics and nebulizer at home. Adequate incentive spirometry usage performed at bedside with no issues.     Review of Systems   Constitutional:  Negative for chills, fever and malaise/fatigue.   HENT:  Negative for congestion, ear discharge, nosebleeds, sinus pain and sore throat.    Eyes:  Negative for discharge and redness.   Respiratory:  Positive for cough, sputum production and shortness of breath. Negative for wheezing.    Cardiovascular:  Negative for chest pain, orthopnea and leg swelling.   Gastrointestinal:  Negative for abdominal pain, constipation, diarrhea, nausea and vomiting.   Genitourinary:  Negative for dysuria, frequency and urgency.   Musculoskeletal:  Positive for back pain. Negative for myalgias.   Skin: Negative.    Neurological:  Negative for dizziness, tingling, weakness and headaches.   Endo/Heme/Allergies: Negative.    Psychiatric/Behavioral: Negative.         Please refer to the H&P for past medical, family, and social history.    OBJECTIVE:     Vital Signs Recent:  Temp: (!) 101 °F (38.3 °C) (03/01/24 1155)  Pulse: 102 (03/01/24 1155)  Resp: 16 (03/01/24  1155)  BP: 126/61 (03/01/24 1155)  SpO2: (!) 90 % (03/01/24 1155)  Oxygen Documentation:                Device (Oxygen Therapy): room air         Vital Signs Range (Last 24H):  Temp:  [97.6 °F (36.4 °C)-101 °F (38.3 °C)]   Pulse:  []   Resp:  [16-20]   BP: (116-150)/(57-71)   SpO2:  [90 %-95 %]        I & O (Last 24H):  Intake/Output Summary (Last 24 hours) at 3/1/2024 1203  Last data filed at 3/1/2024 0819  Gross per 24 hour   Intake 120 ml   Output --   Net 120 ml        Physical Exam  Constitutional:       Appearance: Normal appearance.   HENT:      Head: Normocephalic.      Nose: Nose normal.      Mouth/Throat:      Mouth: Mucous membranes are moist.   Eyes:      Extraocular Movements: Extraocular movements intact.      Pupils: Pupils are equal, round, and reactive to light.   Cardiovascular:      Rate and Rhythm: Normal rate and regular rhythm.      Pulses: Normal pulses.   Pulmonary:      Effort: Pulmonary effort is normal.      Breath sounds: Normal breath sounds.      Comments: Breath sounds greater R>L but no significant wheeze or rhonchi appreciated  Abdominal:      Palpations: Abdomen is soft.   Musculoskeletal:         General: Normal range of motion.      Cervical back: Normal range of motion.   Skin:     General: Skin is warm and dry.      Capillary Refill: Capillary refill takes less than 2 seconds.   Neurological:      General: No focal deficit present.      Mental Status: She is alert and oriented to person, place, and time.   Psychiatric:         Mood and Affect: Mood normal.         Behavior: Behavior normal.         Thought Content: Thought content normal.       Labs:   Recent Labs   Lab 02/28/24  1404 02/29/24  0415 03/01/24  0415    140 141   K 4.5 4.5 4.5    109 108   CO2 18* 21* 22*   BUN 21 20 18   CREATININE 1.3 1.1 1.1   * 99 87   CALCIUM 9.1 8.2* 8.6*   MG 1.8  --   --      Recent Labs   Lab 02/28/24  1012 02/28/24  1404   ALKPHOS 92 91   ALT 34 35   AST 34 37    ALBUMIN 3.2* 3.1*   PROT 7.7 7.4   BILITOT 0.2 0.2     Recent Labs   Lab 02/28/24  1404 02/29/24  0415 03/01/24  0415   WBC 7.36 7.10 6.12   HGB 9.6* 9.3* 9.5*   HCT 30.3* 29.1* 30.6*    357 401       Diagnostic Results:  No new imaging within the past 24 hours    Scheduled Meds:   albuterol-ipratropium  3 mL Nebulization Q6H WAKE    cetirizine  5 mg Oral Daily    enoxparin  40 mg Subcutaneous Q24H (prophylaxis, 1700)    eszopiclone  3 mg Oral QHS    FLUoxetine  20 mg Oral Daily    levoFLOXacin  750 mg Intravenous Q48H    levothyroxine  88 mcg Oral QHS    OLANZapine  5 mg Oral QHS    pantoprazole  40 mg Oral QAM    predniSONE  10 mg Oral Daily     Continuous Infusions:  PRN Meds:acetaminophen, albuterol-ipratropium, dextrose 10%, dextrose 10%, hydrALAZINE, ondansetron, promethazine-codeine 6.25-10 mg/5 ml, traMADoL    ASSESSMENT/PLAN:   Ms. JACOBO COLEMAN is a 63 y.o. female with a relevant medical history of  metastatic lung cancer, MDD, GERD, hypothyroidism who is being followed up for CAP (community acquired pneumonia).    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  ICD-10 Transition        Resolved Hospital Problems   No resolved problems to display.     1.CAP (community acquired pneumonia)  Gradually resolving symptoms, satting well on room air  -home on PO levofloxacin  -nebuliser albuterol-ipratropium for home  -recommend outpt f/u with PCP, pt requesting to find new PCP care that's within [facility] system to streamline care between providers    2. Malignant  neoplasm of upper lobe of L lung  -advised to continue chemotherapy once antibiotic treatment complete  -f/u with Dr Cheney as needed  -repeat CXR in 2-3 weeks    3. Hypothyroidism  -continue levothyroxine    4. Hypercholesterolemia  -continue statin    5. T2DM without complication or long term use of insulin  A1C 6.3  -monitor as usual on discharge according to PCP regular care    Discharge planning:   Dispo home after scheduled dose of levofloxacin

## 2024-03-01 NOTE — PLAN OF CARE
[id number] RADHA COLEMAN is a 63 y.o.female admitted on 2/28/2024 for CAP (community acquired pneumonia)   Code Status: DNR MRN: [id number]   Review of patient's allergies indicates:   Allergen Reactions    Xylocaine with epinephrine [lidocaine-epinephrine] Anaphylaxis    Lipitor [atorvastatin] Other (See Comments)     Body aches      Methocarbamol-aspirin     Zetia [ezetimibe] Other (See Comments)     Muscle spasms      Augmentin [amoxicillin-pot clavulanate] Rash    Macrodantin [nitrofurantoin macrocrystal] Rash    Omnicef [cefdinir] Rash and Other (See Comments)     GI upset    Pravastatin Rash    Sulfa (sulfonamide antibiotics) Rash     Past Medical History:   Diagnosis Date    Malignant neoplasm of upper lobe of left lung 1/19/2023    Thyroid disease       PRN meds    acetaminophen, 650 mg, Q6H PRN  albuterol-ipratropium, 3 mL, Q6H PRN  dextrose 10%, 12.5 g, PRN  dextrose 10%, 25 g, PRN  hydrALAZINE, 10 mg, Q6H PRN  ondansetron, 4 mg, Q6H PRN  promethazine-codeine 6.25-10 mg/5 ml, 5 mL, Q6H PRN  traMADoL, 50 mg, Q6H PRN      Chart check completed. Will continue plan of care.      Orientation: oriented x 4  [place] Coma Scale Score: 15     Lead Monitored: Lead II Rhythm: normal sinus rhythm    Cardiac/Telemetry Box Number: [id number]  VTE Required Core Measure: Pharmacological prophylaxis initiated/maintained Last Bowel Movement: 02/27/24  Diet diabetic 2000 Calorie     Reginaldo Score: 21  Fall Risk Score: 6  Accucheck []   Freq?      Lines/Drains/Airways       Peripheral Intravenous Line  Duration                  Peripheral IV - Single Lumen 02/28/24 1405 20 G Left Antecubital 1 day                       Problem: Adult Inpatient Plan of Care  Goal: Plan of Care Review  Outcome: Ongoing, Progressing  Goal: Patient-Specific Goal (Individualized)  Outcome: Ongoing, Progressing  Goal: Absence of Hospital-Acquired Illness or Injury  Outcome: Ongoing, Progressing  Goal: Optimal Comfort and Wellbeing  Outcome: Ongoing,  Progressing  Goal: Readiness for Transition of Care  Outcome: Ongoing, Progressing

## 2024-03-01 NOTE — PLAN OF CARE
[id number] RADHA COLEMAN is a 63 y.o.female admitted on 2/28/2024 for CAP (community acquired pneumonia)   Code Status: DNR MRN: [id number]   Review of patient's allergies indicates:   Allergen Reactions    Xylocaine with epinephrine [lidocaine-epinephrine] Anaphylaxis    Lipitor [atorvastatin] Other (See Comments)     Body aches      Methocarbamol-aspirin     Zetia [ezetimibe] Other (See Comments)     Muscle spasms      Augmentin [amoxicillin-pot clavulanate] Rash    Macrodantin [nitrofurantoin macrocrystal] Rash    Omnicef [cefdinir] Rash and Other (See Comments)     GI upset    Pravastatin Rash    Sulfa (sulfonamide antibiotics) Rash     Past Medical History:   Diagnosis Date    Malignant neoplasm of upper lobe of left lung 1/19/2023    Thyroid disease       PRN meds    acetaminophen, 650 mg, Q6H PRN  albuterol-ipratropium, 3 mL, Q6H PRN  dextrose 10%, 12.5 g, PRN  dextrose 10%, 25 g, PRN  hydrALAZINE, 10 mg, Q6H PRN  ondansetron, 4 mg, Q6H PRN  promethazine-codeine 6.25-10 mg/5 ml, 5 mL, Q6H PRN  traMADoL, 50 mg, Q6H PRN      Chart check completed. Will continue plan of care.      Orientation: oriented x 4  [place] Coma Scale Score: 15     Lead Monitored: Lead II Rhythm: normal sinus rhythm    Cardiac/Telemetry Box Number: [id number]  VTE Required Core Measure: Pharmacological prophylaxis initiated/maintained Last Bowel Movement: 02/27/24  Diet diabetic 2000 Calorie     Reginaldo Score: 21  Fall Risk Score: 6  Accucheck []   Freq?      Lines/Drains/Airways       Peripheral Intravenous Line  Duration                  Peripheral IV - Single Lumen 02/28/24 1405 20 G Left Antecubital 1 day

## 2024-03-02 LAB
BACTERIA SPEC AEROBE CULT: NORMAL
BACTERIA SPEC AEROBE CULT: NORMAL
GRAM STN SPEC: NORMAL

## 2024-03-03 ENCOUNTER — PATIENT MESSAGE (OUTPATIENT)
Dept: PULMONOLOGY | Facility: CLINIC | Age: 64
End: 2024-03-03
Payer: COMMERCIAL

## 2024-03-04 LAB
BACTERIA BLD CULT: NORMAL
BACTERIA BLD CULT: NORMAL

## 2024-03-04 NOTE — PROGRESS NOTES
Subjective:      Patient ID: JACOBO COLEMAN is a 63 y.o. female.    Chief Complaint: Dyspnea, hypoxemia    HPI 3/5/2024:    63 year old female with history of HLD, DM2, metastatic EGFR +NSCLC (Dr. Cheney, Osimertinib 80mg daily) who presents to Pulmonary clinic for follow up dyspnea and fatigue.     Chart reviewed- Pt was seen at  2/24 and treated with Doxy and prednisone taper. She was seen by Dr. Cheney 2/28/24- CTA chest ordered to rule out PE as cause of shortness of breath. CTA was negative for PE- but with bilateral GGO, new from 12/2023. She was admitted to the hospital- treated with Levaquin and breathing treatments. Discharged 3/1 with po levaqin.     Negative infectious work up:  - procal 0.06  - No leukocytosis  - flu/covid negative  - sputum cx- normal elvira  - Resp infeciton panel- none detected    CTA chest-  multifocal ggo with right predominance    Today:  Mrs. Coleman reports that she feels about the same as she has been, she has not noticed an improvement in symptoms since being admitted (or the doxy/levaquin for that matter). She reports not having much appetite, but that has been ongoing with the chemo.     SpO1 91% while at rest in clinic- Recorded SpO2 of 88% after one lap around clinic- home oxygen ordered.     Review of Systems   Constitutional:  Positive for fatigue. Negative for fever.   Respiratory:  Positive for cough and dyspnea on extertion. Negative for sputum production, shortness of breath and wheezing.      Objective:     Physical Exam   Constitutional: She is oriented to person, place, and time. She appears well-developed and well-nourished. She is not obese.   Cardiovascular: Normal rate and regular rhythm.   Pulmonary/Chest: Normal expansion, effort normal and breath sounds normal.   Normal effort at rest on room air, slight breathlessness with extended conversation   Musculoskeletal:         General: No edema.   Neurological: She is alert and oriented to person, place, and  time.   Skin: Skin is warm and dry.   Psychiatric: She has a normal mood and affect.     Personal Diagnostic Review  As Above      3/1/2024     1:23 PM 3/1/2024     1:00 PM 3/1/2024    11:55 AM 3/1/2024     8:49 AM 3/1/2024     7:25 AM 3/1/2024     4:35 AM 2024    11:46 PM   Pulmonary Function Tests   SpO2 93 % 94 % 90 % 92 % 93 % 91 % 91 %   Weight       82 kg (180 lb 12.4 oz)   BMI (Calculated)       25.9        Assessment:     No diagnosis found.     Outpatient Encounter Medications as of 3/5/2024   Medication Sig Dispense Refill    albuterol (VENTOLIN HFA) 90 mcg/actuation inhaler Inhale 2 puffs into the lungs every 6 (six) hours as needed for Wheezing. Rescue 18 g 0    azelastine (ASTELIN) 137 mcg (0.1 %) nasal spray 1 spray by Nasal route 2 (two) times daily.      cetirizine (ZYRTEC) 10 MG tablet Take 5 mg by mouth once daily.      cycloSPORINE (RESTASIS) 0.05 % ophthalmic emulsion Place 1 drop into both eyes 2 (two) times a day.      eszopiclone (LUNESTA) 3 mg Tab Take 3 mg by mouth every evening.      FLUoxetine 20 MG capsule Take 20 mg by mouth once daily.      fluticasone propionate (FLONASE) 50 mcg/actuation nasal spray 1 spray by Each Nostril route 2 (two) times daily.      hydrOXYzine HCL (ATARAX) 25 MG tablet Take 25 mg by mouth once daily.      ibuprofen (ADVIL,MOTRIN) 200 MG tablet Take by mouth every 6 (six) hours as needed.      ipratropium (ATROVENT) 21 mcg (0.03 %) nasal spray SMARTSI Spray(s) Both Nares 2-3 Times Daily      levoFLOXacin (LEVAQUIN) 750 MG tablet Take 1 tablet (750 mg total) by mouth once daily. for 5 days 5 tablet 0    levothyroxine (SYNTHROID) 88 MCG tablet Take 88 mcg by mouth every evening.      multivitamin capsule Take 1 capsule by mouth once daily.      mupirocin (BACTROBAN) 2 % ointment Apply topically 3 (three) times daily.      OLANZapine (ZYPREXA) 5 MG tablet Take 5 mg by mouth every evening.      ondansetron (ZOFRAN-ODT) 4 MG TbDL Take 2 mg by mouth every 6  (six) hours as needed.      osimertinib (TAGRISSO) 80 mg Tab Take 1 tab (80 mg) by mouth once daily. 30 tablet 11    pantoprazole (PROTONIX) 40 MG tablet Take 1 tablet by mouth every morning.      promethazine (PHENERGAN) 25 MG tablet Take 25 mg by mouth every 6 (six) hours as needed.      promethazine-dextromethorphan (PROMETHAZINE-DM) 6.25-15 mg/5 mL Syrp Take 5 mLs by mouth every 4 (four) hours as needed. 120 mL 0    varenicline 0.03 mg/spray sprm 1 spray by Each Nostril route 2 (two) times daily.       No facility-administered encounter medications on file as of 3/5/2024.     No orders of the defined types were placed in this encounter.      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 Cheney- I will update on current status          Other Visit Diagnoses       Chronic bronchitis, mucopurulent    -  Primary    Relevant Medications    levoFLOXacin (LEVAQUIN) 750 MG tablet    albuterol (VENTOLIN HFA) 90 mcg/actuation inhaler    fluticasone-salmeterol diskus inhaler 250-50 mcg    Other Relevant Orders    Stress test, pulmonary    X-Ray Chest PA And Lateral (Completed)    OXYGEN FOR HOME USE          Plan discussed with pt and she expressed understanding- concern for pulmonary toxicity, to start Prednisone. Additionally, I  reviewed her case and imaging with Dr. Catalan who is in agreement with plan. Close follow up in 2 weeks.

## 2024-03-05 ENCOUNTER — PATIENT MESSAGE (OUTPATIENT)
Dept: PULMONOLOGY | Facility: CLINIC | Age: 64
End: 2024-03-05

## 2024-03-05 ENCOUNTER — OFFICE VISIT (OUTPATIENT)
Dept: PULMONOLOGY | Facility: CLINIC | Age: 64
End: 2024-03-05
Payer: COMMERCIAL

## 2024-03-05 ENCOUNTER — HOSPITAL ENCOUNTER (OUTPATIENT)
Dept: RADIOLOGY | Facility: HOSPITAL | Age: 64
Discharge: HOME | End: 2024-03-05
Attending: HOSPITALIST
Payer: COMMERCIAL

## 2024-03-05 ENCOUNTER — CLINICAL SUPPORT (OUTPATIENT)
Dept: PULMONOLOGY | Facility: CLINIC | Age: 64
End: 2024-03-05
Payer: COMMERCIAL

## 2024-03-05 VITALS
BODY MASS INDEX: 24.14 KG/M2 | HEART RATE: 115 BPM | WEIGHT: 168.63 LBS | DIASTOLIC BLOOD PRESSURE: 76 MMHG | RESPIRATION RATE: 17 BRPM | SYSTOLIC BLOOD PRESSURE: 110 MMHG | HEIGHT: 70 IN | OXYGEN SATURATION: 91 %

## 2024-03-05 DIAGNOSIS — J41.1 CHRONIC BRONCHITIS, MUCOPURULENT: ICD-10-CM

## 2024-03-05 DIAGNOSIS — C34.12 MALIGNANT NEOPLASM OF UPPER LOBE OF LEFT LUNG: ICD-10-CM

## 2024-03-05 DIAGNOSIS — T45.1X5A CHEMOTHERAPY INDUCED PULMONARY TOXICITY: ICD-10-CM

## 2024-03-05 DIAGNOSIS — J41.1 CHRONIC BRONCHITIS, MUCOPURULENT: Primary | ICD-10-CM

## 2024-03-05 DIAGNOSIS — J98.4 CHEMOTHERAPY INDUCED PULMONARY TOXICITY: ICD-10-CM

## 2024-03-05 PROCEDURE — 4010F ACE/ARB THERAPY RXD/TAKEN: CPT | Mod: CPTII,S$GLB,, | Performed by: HOSPITALIST

## 2024-03-05 PROCEDURE — 1111F DSCHRG MED/CURRENT MED MERGE: CPT | Mod: CPTII,S$GLB,, | Performed by: HOSPITALIST

## 2024-03-05 PROCEDURE — 3074F SYST BP LT 130 MM HG: CPT | Mod: CPTII,S$GLB,, | Performed by: HOSPITALIST

## 2024-03-05 PROCEDURE — 3008F BODY MASS INDEX DOCD: CPT | Mod: CPTII,S$GLB,, | Performed by: HOSPITALIST

## 2024-03-05 PROCEDURE — 99215 OFFICE O/P EST HI 40 MIN: CPT | Mod: S$GLB,,, | Performed by: HOSPITALIST

## 2024-03-05 PROCEDURE — 71046 X-RAY EXAM CHEST 2 VIEWS: CPT | Mod: 26,,, | Performed by: RADIOLOGY

## 2024-03-05 PROCEDURE — 3078F DIAST BP <80 MM HG: CPT | Mod: CPTII,S$GLB,, | Performed by: HOSPITALIST

## 2024-03-05 PROCEDURE — 71046 X-RAY EXAM CHEST 2 VIEWS: CPT | Mod: TC

## 2024-03-05 PROCEDURE — 1160F RVW MEDS BY RX/DR IN RCRD: CPT | Mod: CPTII,S$GLB,, | Performed by: HOSPITALIST

## 2024-03-05 PROCEDURE — 1159F MED LIST DOCD IN RCRD: CPT | Mod: CPTII,S$GLB,, | Performed by: HOSPITALIST

## 2024-03-05 PROCEDURE — 99999 PR PBB SHADOW E&M-EST. PATIENT-LVL V: CPT | Mod: PBBFAC,,, | Performed by: HOSPITALIST

## 2024-03-05 PROCEDURE — 3044F HG A1C LEVEL LT 7.0%: CPT | Mod: CPTII,S$GLB,, | Performed by: HOSPITALIST

## 2024-03-05 RX ORDER — ALBUTEROL SULFATE 90 UG/1
2 AEROSOL, METERED RESPIRATORY (INHALATION) EVERY 6 HOURS PRN
Qty: 18 G | Refills: 3 | Status: SHIPPED | OUTPATIENT
Start: 2024-03-05 | End: 2024-04-10 | Stop reason: SDUPTHER

## 2024-03-05 RX ORDER — PREDNISONE 20 MG/1
40 TABLET ORAL DAILY
Qty: 28 TABLET | Refills: 0 | Status: SHIPPED | OUTPATIENT
Start: 2024-03-05 | End: 2024-03-19

## 2024-03-05 RX ORDER — LEVOFLOXACIN 750 MG/1
750 TABLET ORAL DAILY
Qty: 3 TABLET | Refills: 0 | Status: SHIPPED | OUTPATIENT
Start: 2024-03-05 | End: 2024-04-15

## 2024-03-05 RX ORDER — PREDNISONE 20 MG/1
20 TABLET ORAL DAILY
Qty: 14 TABLET | Refills: 0 | Status: SHIPPED | OUTPATIENT
Start: 2024-03-20 | End: 2024-03-25

## 2024-03-05 RX ORDER — METHYLPREDNISOLONE 4 MG/1
TABLET ORAL
Qty: 21 EACH | Refills: 0 | Status: SHIPPED | OUTPATIENT
Start: 2024-03-05 | End: 2024-03-05

## 2024-03-05 RX ORDER — FLUTICASONE PROPIONATE AND SALMETEROL 250; 50 UG/1; UG/1
1 POWDER RESPIRATORY (INHALATION) 2 TIMES DAILY
Qty: 60 EACH | Refills: 3 | Status: SHIPPED | OUTPATIENT
Start: 2024-03-05 | End: 2024-03-11 | Stop reason: ALTCHOICE

## 2024-03-05 NOTE — ASSESSMENT & PLAN NOTE
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

## 2024-03-06 ENCOUNTER — PATIENT MESSAGE (OUTPATIENT)
Dept: INFUSION THERAPY | Facility: HOSPITAL | Age: 64
End: 2024-03-06
Payer: COMMERCIAL

## 2024-03-08 DIAGNOSIS — J98.4 CHEMOTHERAPY INDUCED PULMONARY TOXICITY: Primary | ICD-10-CM

## 2024-03-08 DIAGNOSIS — T45.1X5A CHEMOTHERAPY INDUCED PULMONARY TOXICITY: Primary | ICD-10-CM

## 2024-03-11 ENCOUNTER — OFFICE VISIT (OUTPATIENT)
Dept: PULMONOLOGY | Facility: CLINIC | Age: 64
End: 2024-03-11
Payer: COMMERCIAL

## 2024-03-11 ENCOUNTER — CLINICAL SUPPORT (OUTPATIENT)
Dept: PULMONOLOGY | Facility: CLINIC | Age: 64
End: 2024-03-11
Payer: COMMERCIAL

## 2024-03-11 VITALS
HEART RATE: 102 BPM | SYSTOLIC BLOOD PRESSURE: 130 MMHG | HEIGHT: 70 IN | DIASTOLIC BLOOD PRESSURE: 76 MMHG | BODY MASS INDEX: 24.3 KG/M2 | WEIGHT: 169.75 LBS | RESPIRATION RATE: 18 BRPM | OXYGEN SATURATION: 95 %

## 2024-03-11 VITALS — WEIGHT: 169.75 LBS | HEIGHT: 70 IN | BODY MASS INDEX: 24.3 KG/M2

## 2024-03-11 DIAGNOSIS — J41.1 CHRONIC BRONCHITIS, MUCOPURULENT: ICD-10-CM

## 2024-03-11 DIAGNOSIS — J41.0 SIMPLE CHRONIC BRONCHITIS: ICD-10-CM

## 2024-03-11 DIAGNOSIS — J98.4 CHEMOTHERAPY INDUCED PULMONARY TOXICITY: ICD-10-CM

## 2024-03-11 DIAGNOSIS — J18.9 PNEUMONIA OF BOTH LUNGS DUE TO INFECTIOUS ORGANISM, UNSPECIFIED PART OF LUNG: ICD-10-CM

## 2024-03-11 DIAGNOSIS — T45.1X5A CHEMOTHERAPY INDUCED PULMONARY TOXICITY: ICD-10-CM

## 2024-03-11 DIAGNOSIS — C34.90 MALIGNANT NEOPLASM OF LUNG, UNSPECIFIED LATERALITY, UNSPECIFIED PART OF LUNG: ICD-10-CM

## 2024-03-11 DIAGNOSIS — R09.02 EXERCISE HYPOXEMIA: Primary | ICD-10-CM

## 2024-03-11 PROCEDURE — 1111F DSCHRG MED/CURRENT MED MERGE: CPT | Mod: CPTII,S$GLB,, | Performed by: INTERNAL MEDICINE

## 2024-03-11 PROCEDURE — 3078F DIAST BP <80 MM HG: CPT | Mod: CPTII,S$GLB,, | Performed by: INTERNAL MEDICINE

## 2024-03-11 PROCEDURE — 1159F MED LIST DOCD IN RCRD: CPT | Mod: CPTII,S$GLB,, | Performed by: INTERNAL MEDICINE

## 2024-03-11 PROCEDURE — 3075F SYST BP GE 130 - 139MM HG: CPT | Mod: CPTII,S$GLB,, | Performed by: INTERNAL MEDICINE

## 2024-03-11 PROCEDURE — 94618 PULMONARY STRESS TESTING: CPT | Mod: S$GLB,,, | Performed by: INTERNAL MEDICINE

## 2024-03-11 PROCEDURE — 4010F ACE/ARB THERAPY RXD/TAKEN: CPT | Mod: CPTII,S$GLB,, | Performed by: INTERNAL MEDICINE

## 2024-03-11 PROCEDURE — 99999 PR PBB SHADOW E&M-EST. PATIENT-LVL V: CPT | Mod: PBBFAC,,, | Performed by: INTERNAL MEDICINE

## 2024-03-11 PROCEDURE — 3008F BODY MASS INDEX DOCD: CPT | Mod: CPTII,S$GLB,, | Performed by: INTERNAL MEDICINE

## 2024-03-11 PROCEDURE — 99215 OFFICE O/P EST HI 40 MIN: CPT | Mod: 25,S$GLB,, | Performed by: INTERNAL MEDICINE

## 2024-03-11 PROCEDURE — 3044F HG A1C LEVEL LT 7.0%: CPT | Mod: CPTII,S$GLB,, | Performed by: INTERNAL MEDICINE

## 2024-03-11 PROCEDURE — 99999 PR PBB SHADOW E&M-EST. PATIENT-LVL I: CPT | Mod: PBBFAC,,,

## 2024-03-11 RX ORDER — IPRATROPIUM BROMIDE AND ALBUTEROL SULFATE 2.5; .5 MG/3ML; MG/3ML
3 SOLUTION RESPIRATORY (INHALATION) EVERY 6 HOURS PRN
Qty: 300 ML | Refills: 11 | Status: SHIPPED | OUTPATIENT
Start: 2024-03-11 | End: 2024-03-12 | Stop reason: SDUPTHER

## 2024-03-11 NOTE — PROGRESS NOTES
Subjective:     Patient ID: JACOBO COLEMAN is a 63 y.o. female.    Chief Complaint:  shortness of breath and oxygen dependancy    HPI    Hx of metastaic cancer  Started oral chemo  2 weeks ago  More shortness of breath     History of pneumonia - treated in hospital for 2 days.  Restarted on high dose prednisone and duonebs      Dyspnea  Patient complains of shortness of breath. Symptoms occur while getting dressed, with one block walking. Symptoms began 1 month ago, gradually worsening since. Associated symptoms include  dyspnea on exertion, productive cough, shortness of breath, and clear thick mucous . She denies chest pain, located left chest. She does not have had recent travel. Weight has decreased 10 pounds over last 6 months . Symptoms are exacerbated by any exercise. Symptoms are alleviated by rest and oxygen.     Past Medical History:   Diagnosis Date    Malignant neoplasm of upper lobe of left lung 1/19/2023    Thyroid disease      Past Surgical History:   Procedure Laterality Date    CHOLECYSTECTOMY      ENDOBRONCHIAL ULTRASOUND Left 1/5/2023    Procedure: ENDOBRONCHIAL ULTRASOUND (EBUS);  Surgeon: Bam Catalan MD;  Location: [facility];  Service: Pulmonary;  Laterality: Left;    HYSTERECTOMY      NAVIGATIONAL BRONCHOSCOPY Left 1/5/2023    Procedure: BRONCHOSCOPY, NAVIGATIONAL;  Surgeon: Bam Catalan MD;  Location: [facility];  Service: Pulmonary;  Laterality: Left;     Review of patient's allergies indicates:   Allergen Reactions    Xylocaine with epinephrine [lidocaine-epinephrine] Anaphylaxis    Lipitor [atorvastatin] Other (See Comments)     Body aches      Methocarbamol-aspirin     Zetia [ezetimibe] Other (See Comments)     Muscle spasms      Augmentin [amoxicillin-pot clavulanate] Rash    Macrodantin [nitrofurantoin macrocrystal] Rash    Omnicef [cefdinir] Rash and Other (See Comments)     GI upset    Pravastatin Rash    Sulfa (sulfonamide antibiotics) Rash     Current Outpatient Medications  on File Prior to Visit   Medication Sig Dispense Refill    albuterol (VENTOLIN HFA) 90 mcg/actuation inhaler Inhale 2 puffs into the lungs every 6 (six) hours as needed for Wheezing. Rescue 18 g 3    azelastine (ASTELIN) 137 mcg (0.1 %) nasal spray 1 spray by Nasal route 2 (two) times daily.      cetirizine (ZYRTEC) 10 MG tablet Take 5 mg by mouth once daily.      cycloSPORINE (RESTASIS) 0.05 % ophthalmic emulsion Place 1 drop into both eyes 2 (two) times a day.      eszopiclone (LUNESTA) 3 mg Tab Take 3 mg by mouth every evening.      FLUoxetine 20 MG capsule Take 20 mg by mouth once daily.      fluticasone propionate (FLONASE) 50 mcg/actuation nasal spray 1 spray by Each Nostril route 2 (two) times daily.      hydrOXYzine HCL (ATARAX) 25 MG tablet Take 25 mg by mouth once daily.      ibuprofen (ADVIL,MOTRIN) 200 MG tablet Take by mouth every 6 (six) hours as needed.      ipratropium (ATROVENT) 21 mcg (0.03 %) nasal spray SMARTSI Spray(s) Both Nares 2-3 Times Daily      levothyroxine (SYNTHROID) 88 MCG tablet Take 88 mcg by mouth every evening.      multivitamin capsule Take 1 capsule by mouth once daily.      mupirocin (BACTROBAN) 2 % ointment Apply topically 3 (three) times daily.      ondansetron (ZOFRAN-ODT) 4 MG TbDL Take 2 mg by mouth every 6 (six) hours as needed.      pantoprazole (PROTONIX) 40 MG tablet Take 1 tablet by mouth every morning.      predniSONE (DELTASONE) 20 MG tablet Take 2 tablets (40 mg total) by mouth once daily. for 14 days 28 tablet 0    promethazine (PHENERGAN) 25 MG tablet Take 25 mg by mouth every 6 (six) hours as needed.      promethazine-dextromethorphan (PROMETHAZINE-DM) 6.25-15 mg/5 mL Syrp Take 5 mLs by mouth every 4 (four) hours as needed. 120 mL 0    [DISCONTINUED] fluticasone-salmeterol diskus inhaler 250-50 mcg Inhale 1 puff into the lungs 2 (two) times daily. Controller 60 each 3    levoFLOXacin (LEVAQUIN) 750 MG tablet Take 1 tablet (750 mg total) by mouth once daily.  (Patient not taking: Reported on 3/11/2024) 3 tablet 0    OLANZapine (ZYPREXA) 5 MG tablet Take 5 mg by mouth every evening.      osimertinib (TAGRISSO) 80 mg Tab Take 1 tab (80 mg) by mouth once daily. (Patient not taking: Reported on 3/11/2024.) 30 tablet 11    [START ON 3/20/2024] predniSONE (DELTASONE) 20 MG tablet Take 1 tablet (20 mg total) by mouth once daily. (Patient not taking: Reported on 3/11/2024) 14 tablet 0    varenicline 0.03 mg/spray sprm 1 spray by Each Nostril route 2 (two) times daily.       No current facility-administered medications on file prior to visit.     Social History     Socioeconomic History    Marital status:    Tobacco Use    Smoking status: Former     Current packs/day: 0.00     Types: Cigarettes     Quit date:      Years since quittin.2     Passive exposure: Never    Smokeless tobacco: Never   Substance and Sexual Activity    Alcohol use: Yes     Alcohol/week: 2.0 standard drinks of alcohol     Types: 2 Glasses of wine per week     Comment: socially    Drug use: Not Currently    Sexual activity: Not Currently   Social History Narrative    ** Merged History Encounter **          Social Determinants of Health     Financial Resource Strain: Low Risk  (2023)    Overall Financial Resource Strain (CARDIA)     Difficulty of Paying Living Expenses: Not hard at all   Food Insecurity: No Food Insecurity (2023)    Hunger Vital Sign     Worried About Running Out of Food in the Last Year: Never true     Ran Out of Food in the Last Year: Never true   Transportation Needs: No Transportation Needs (2023)    PRAPARE - Transportation     Lack of Transportation (Medical): No     Lack of Transportation (Non-Medical): No   Physical Activity: Sufficiently Active (2023)    Exercise Vital Sign     Days of Exercise per Week: 5 days     Minutes of Exercise per Session: 30 min   Stress: No Stress Concern Present (2023)    [employer] [place]  "Health - Occupational Stress Questionnaire     Feeling of Stress : Not at all   Social Connections: Unknown (12/25/2023)    Social Connection and Isolation Panel [NHANES]     Frequency of Communication with Friends and Family: More than three times a week     Frequency of Social Gatherings with Friends and Family: Once a week     Active Member of Clubs or Organizations: Yes     Attends Club or Organization Meetings: More than 4 times per year     Marital Status: Living with partner   Housing Stability: Low Risk  (12/25/2023)    Housing Stability Vital Sign     Unable to Pay for Housing in the Last Year: No     Number of Places Lived in the Last Year: 1     Unstable Housing in the Last Year: No     Family History   Problem Relation Age of Onset    Heart failure Mother     Diabetes Mother     Rheum arthritis Father     Diabetes Brother        Review of Systems    Objective:      /76   Pulse 102   Resp 18   Ht 5' 10" (1.778 m)   Wt 77 kg (169 lb 12.1 oz)   SpO2 95% Comment: on 2L O2  BMI 24.36 kg/m²   Physical Exam  Personal Diagnostic Review  Chest x-ray: bilateral hilar infiltrates        3/11/2024     1:17 PM   Pulmonary Studies Review   SpO2 95 %   Height 5' 10" (1.778 m)   Weight 77 kg (169 lb 12.1 oz)   BMI (Calculated) 24.4   Predicted Distance 352.62   Predicted Distance Meters (Calculated) 495.91 meters       X-Ray Chest PA And Lateral  Narrative: EXAMINATION:  XR CHEST PA AND LATERAL    CLINICAL HISTORY:  Mucopurulent chronic bronchitis    TECHNIQUE:  PA and lateral views of the chest were performed.    COMPARISON:  02/28/2024    FINDINGS:  The cardiac and mediastinal silhouettes appear within normal limits.   There are persistent ill-defined patchy parenchymal opacity seen throughout the lungs bilaterally, remaining most severe in the right mid lung.  Appearance remains concerning for multifocal infectious/inflammatory process.  Findings may be slightly worsened from prior chest x-ray allowing " "for some technical differences.  No definite pleural effusion demonstrated.  No acute osseous findings demonstrated.  Impression: Possible slight interval worsening as above    This report was flagged in Epic as abnormal.    Electronically signed by: Kapil Whitt MD  Date:    03/05/2024  Time:    09:32      Office Spirometry Results:  [facility] Function 3rdFl  Six Minute Walk      SUMMARY      Ordering Provider: Jami Smith PA-C      Interpreting Provider: Nicolas Ponce MD  Performing nurse/tech/RT: VT, RT  Diagnosis:  (Jami Smith PA-C)  Height: 5' 10" (177.8 cm)  Weight: 77 kg (169 lb 12.1 oz)  BMI (Calculated): 24.4              Patient Race:                                                                Phase Oxygen Assessment Supplemental O2 Heart   Rate Blood Pressure Gladys Dyspnea Scale Rating   Resting 95 % Room Air 102 bpm 118/66 3   Exercise             Minute             1 90 % Room Air 114 bpm       2 90 % Room Air 119 bpm       3 90 % Room Air 121 bpm       4 89 % Room Air 124 bpm       5 89 % Room Air 127 bpm       6  88 % Room Air 125 bpm 151/70 7-8   Recovery             Minute             1 94 % 2 L/M 110 bpm       2 95 % 2 L/M 103 bpm       3 95 % 2 L/M 103 bpm       4 95 % 2 L/M 102 bpm 130/76 3      Six Minute Walk Summary  6MWT Status: completed without stopping  Patient Reported: Dyspnea         Interpretation:  Did the patient stop or pause?: No  Total Time Walked (Calculated): 360 seconds  Final Partial Lap Distance (feet): 175 feet  Total Distance Meters (Calculated): 419.1 meters  Predicted Distance Meters (Calculated): 495.91 meters  Percentage of Predicted (Calculated): 84.51  Peak VO2 (Calculated): 16.55  Mets: 4.73  Has The Patient Had a Previous Six Minute Walk Test?: No     Previous 6MWT Results  Has The Patient Had a Previous Six Minute Walk Test?: No     Interpretation:  Total distance walked in six minutes is normal indicating normal functional " "capacity. There was significant severe oxygen desaturation to 88 % at rest on room air prior to exercise (oxygen saturation less than 89%).The patient was exercised with supplemental oxygen as noted above. Clinical correlation suggested.  Patient met criteria for oxygen prescription. Clinical correlation suggested.    [] Mild exercise-induced hypoxemia described as an arterial oxygen saturation of 93-95% (or 3-4% less than at rest),  [] Moderate exercise-induced hypoxemia as 89-93%  [x] Severe exercise induced hypoxemia as < 89% O2 saturation.  Medicare Criteria for oxygen prescription comments:   When arterial oxygen saturation is at or below 88% during exercise on room air (severe exercise induced hypoxemia) then the patient falls under Medicare Group 1 criteria for supplemental oxygen prescription.  Details about Medicare Group Criteria coverage can be found at http://www.cms.[facility].gov/manuals/downloads/     Nicolas Ponce MD               3/11/2024     1:17 PM 3/11/2024    11:38 AM 3/5/2024     8:24 AM 3/1/2024     1:23 PM 3/1/2024     1:00 PM 3/1/2024    11:55 AM 3/1/2024     8:49 AM   Pulmonary Function Tests   SpO2 95 %  91 % 93 % 94 % 90 % 92 %   Ordering Provider  Jami Smith PA-C        Performing nurse/tech/RT  VT, RT        Height 5' 10" (1.778 m) 5' 10" (1.778 m) 5' 10" (1.778 m)       Weight 77 kg (169 lb 12.1 oz) 77 kg (169 lb 12.1 oz) 76.5 kg (168 lb 10.4 oz)       BMI (Calculated) 24.4 24.4 24.2       Patient Race          6MWT Status  completed without stopping        Patient Reported  Dyspnea        Was O2 used?  No        6MW Distance walked (feet)  1375 feet        Distance walked (meters)  419.1 meters        Did patient stop?  No        Type of assistive device(s) used?  no assistive devices        Oxygen Saturation  95 %        Supplemental Oxygen  Room Air        Heart Rate  102 bpm        Blood Pressure  118/66        Gladys Dyspnea Rating   moderate        Oxygen Saturation  88 %   " "     Supplemental Oxygen  Room Air        Heart Rate  125 bpm        Blood Pressure  151/70        Gladys Dyspnea Rating   very heavy        Recovery Time (seconds)  240 seconds        Oxygen Saturation  95 %        Supplemental Oxygen  2 L/M        Heart Rate  102 bpm        Blood Pressure  130/76        Gladys Dyspnea Rating   moderate        Is procedure ready for interpretation?  Yes        Oxygen Qualification?  Yes        Oxygen Saturation  95 %        Supplemental Oxygen  Room Air        Heart Rate  102 bpm        Blood Pressure  118/63        Gladys Dyspnea Rating   moderate        Oxygen Saturation  88 %        Supplemental Oxygen  Room Air        Heart Rate  125 bpm        Blood Pressure  151/70        Gladys Dyspnea Rating   very heavy        Recovery Time (seconds)  240 seconds        Oxygen Saturation  95 %        Supplemental Oxygen  2 L/M        Heart Rate  102 bpm        Blood Pressure  130/76        Gladys Dyspnea Rating   moderate              3/11/2024     1:17 PM   Pulmonary Studies Review   SpO2 95 %   Height 5' 10" (1.778 m)   Weight 77 kg (169 lb 12.1 oz)   BMI (Calculated) 24.4   Predicted Distance 352.62   Predicted Distance Meters (Calculated) 495.91 meters           Recent Results (from the past 336 hour(s))   CBC Auto Differential    Collection Time: 03/01/24  4:15 AM   Result Value Ref Range    WBC 6.12 3.90 - 12.70 K/uL    Hemoglobin 9.5 (L) 12.0 - 16.0 g/dL    Hematocrit 30.6 (L) 37.0 - 48.5 %    Platelets 401 150 - 450 K/uL   CBC Auto Differential    Collection Time: 02/29/24  4:15 AM   Result Value Ref Range    WBC 7.10 3.90 - 12.70 K/uL    Hemoglobin 9.3 (L) 12.0 - 16.0 g/dL    Hematocrit 29.1 (L) 37.0 - 48.5 %    Platelets 357 150 - 450 K/uL   CBC auto differential    Collection Time: 02/28/24  2:04 PM   Result Value Ref Range    WBC 7.36 3.90 - 12.70 K/uL    Hemoglobin 9.6 (L) 12.0 - 16.0 g/dL    Hematocrit 30.3 (L) 37.0 - 48.5 %    Platelets 411 150 - 450 K/uL       Assessment:        "     Simple chronic bronchitis  -     albuterol-ipratropium (DUO-NEB) 2.5 mg-0.5 mg/3 mL nebulizer solution; Take 3 mLs by nebulization every 6 (six) hours as needed for Wheezing or Shortness of Breath. Rescue  Dispense: 300 mL; Refill: 11  -     budesonide-glycopyr-formoterol 160-9-4.8 mcg/actuation HFAA; Inhale 2 puffs into the lungs 2 (two) times daily. Wash out mouth after use  Dispense: 10.7 g; Refill: 11  -     OXYGEN FOR HOME USE  -     X-Ray Chest 4 Or More View; Future; Expected date: 03/11/2024    Chronic bronchitis, mucopurulent  -     albuterol-ipratropium (DUO-NEB) 2.5 mg-0.5 mg/3 mL nebulizer solution; Take 3 mLs by nebulization every 6 (six) hours as needed for Wheezing or Shortness of Breath. Rescue  Dispense: 300 mL; Refill: 11  -     budesonide-glycopyr-formoterol 160-9-4.8 mcg/actuation HFAA; Inhale 2 puffs into the lungs 2 (two) times daily. Wash out mouth after use  Dispense: 10.7 g; Refill: 11    Exercise hypoxemia  -     OXYGEN FOR HOME USE    Malignant neoplasm of lung, unspecified laterality, unspecified part of lung  -     OXYGEN FOR HOME USE    Pneumonia of both lungs due to infectious organism, unspecified part of lung  -     X-Ray Chest 4 Or More View; Future; Expected date: 03/11/2024    Chemotherapy induced pulmonary toxicity          Outpatient Encounter Medications as of 3/11/2024   Medication Sig Dispense Refill    albuterol (VENTOLIN HFA) 90 mcg/actuation inhaler Inhale 2 puffs into the lungs every 6 (six) hours as needed for Wheezing. Rescue 18 g 3    azelastine (ASTELIN) 137 mcg (0.1 %) nasal spray 1 spray by Nasal route 2 (two) times daily.      cetirizine (ZYRTEC) 10 MG tablet Take 5 mg by mouth once daily.      cycloSPORINE (RESTASIS) 0.05 % ophthalmic emulsion Place 1 drop into both eyes 2 (two) times a day.      eszopiclone (LUNESTA) 3 mg Tab Take 3 mg by mouth every evening.      FLUoxetine 20 MG capsule Take 20 mg by mouth once daily.      fluticasone propionate (FLONASE)  50 mcg/actuation nasal spray 1 spray by Each Nostril route 2 (two) times daily.      hydrOXYzine HCL (ATARAX) 25 MG tablet Take 25 mg by mouth once daily.      ibuprofen (ADVIL,MOTRIN) 200 MG tablet Take by mouth every 6 (six) hours as needed.      ipratropium (ATROVENT) 21 mcg (0.03 %) nasal spray SMARTSI Spray(s) Both Nares 2-3 Times Daily      levothyroxine (SYNTHROID) 88 MCG tablet Take 88 mcg by mouth every evening.      multivitamin capsule Take 1 capsule by mouth once daily.      mupirocin (BACTROBAN) 2 % ointment Apply topically 3 (three) times daily.      ondansetron (ZOFRAN-ODT) 4 MG TbDL Take 2 mg by mouth every 6 (six) hours as needed.      pantoprazole (PROTONIX) 40 MG tablet Take 1 tablet by mouth every morning.      predniSONE (DELTASONE) 20 MG tablet Take 2 tablets (40 mg total) by mouth once daily. for 14 days 28 tablet 0    promethazine (PHENERGAN) 25 MG tablet Take 25 mg by mouth every 6 (six) hours as needed.      promethazine-dextromethorphan (PROMETHAZINE-DM) 6.25-15 mg/5 mL Syrp Take 5 mLs by mouth every 4 (four) hours as needed. 120 mL 0    [DISCONTINUED] fluticasone-salmeterol diskus inhaler 250-50 mcg Inhale 1 puff into the lungs 2 (two) times daily. Controller 60 each 3    albuterol-ipratropium (DUO-NEB) 2.5 mg-0.5 mg/3 mL nebulizer solution Take 3 mLs by nebulization every 6 (six) hours as needed for Wheezing or Shortness of Breath. Rescue 300 mL 11    budesonide-glycopyr-formoterol 160-9-4.8 mcg/actuation HFAA Inhale 2 puffs into the lungs 2 (two) times daily. Wash out mouth after use 10.7 g 11    levoFLOXacin (LEVAQUIN) 750 MG tablet Take 1 tablet (750 mg total) by mouth once daily. (Patient not taking: Reported on 3/11/2024) 3 tablet 0    OLANZapine (ZYPREXA) 5 MG tablet Take 5 mg by mouth every evening.      osimertinib (TAGRISSO) 80 mg Tab Take 1 tab (80 mg) by mouth once daily. (Patient not taking: Reported on 3/11/2024.) 30 tablet 11    [START ON 3/20/2024] predniSONE  (DELTASONE) 20 MG tablet Take 1 tablet (20 mg total) by mouth once daily. (Patient not taking: Reported on 3/11/2024) 14 tablet 0    varenicline 0.03 mg/spray sprm 1 spray by Each Nostril route 2 (two) times daily.       No facility-administered encounter medications on file as of 3/11/2024.     Plan:       Requested Prescriptions     Signed Prescriptions Disp Refills    albuterol-ipratropium (DUO-NEB) 2.5 mg-0.5 mg/3 mL nebulizer solution 300 mL 11     Sig: Take 3 mLs by nebulization every 6 (six) hours as needed for Wheezing or Shortness of Breath. Rescue    budesonide-glycopyr-formoterol 160-9-4.8 mcg/actuation HFAA 10.7 g 11     Sig: Inhale 2 puffs into the lungs 2 (two) times daily. Wash out mouth after use     Problem List Items Addressed This Visit       Chemotherapy induced pulmonary toxicity     Other Visit Diagnoses       Simple chronic bronchitis    -  Primary    Relevant Medications    albuterol-ipratropium (DUO-NEB) 2.5 mg-0.5 mg/3 mL nebulizer solution    budesonide-glycopyr-formoterol 160-9-4.8 mcg/actuation HFAA    Other Relevant Orders    OXYGEN FOR HOME USE    X-Ray Chest 4 Or More View    Chronic bronchitis, mucopurulent        Relevant Medications    albuterol-ipratropium (DUO-NEB) 2.5 mg-0.5 mg/3 mL nebulizer solution    budesonide-glycopyr-formoterol 160-9-4.8 mcg/actuation HFAA    Exercise hypoxemia        Relevant Orders    OXYGEN FOR HOME USE    Malignant neoplasm of lung, unspecified laterality, unspecified part of lung        Relevant Orders    OXYGEN FOR HOME USE    Pneumonia of both lungs due to infectious organism, unspecified part of lung        Relevant Orders    X-Ray Chest 4 Or More View               Follow up in about 30 days (around 4/10/2024) for CXR on return.    MEDICAL DECISION MAKING: Moderate to high complexity.  Overall, the multiple problems listed are of moderate to high severity that may impact quality of life and activities of daily living. Side effects of medications,  treatment plan as well as options and alternatives reviewed and discussed with patient. There was counseling of patient concerning these issues.    Total time spent in counseling and coordination of care - 40  minutes of total time spent on the encounter, which includes face to face time and non-face to face time preparing to see the patient (eg, review of tests), Obtaining and/or reviewing separately obtained history, Documenting clinical information in the electronic or other health record, Independently interpreting results (not separately reported) and communicating results to the patient/family/caregiver, or Care coordination (not separately reported).    Time was used in discussion of prognosis, risks, benefits of treatment, instructions and compliance with regimen . Discussion with other physicians and/or health care providers - home health or for use of durable medical equipment (oxygen, nebulizers, CPAP, BiPAP) occurred.

## 2024-03-11 NOTE — PROCEDURES
"[facility] 3rdFl  Six Minute Walk     SUMMARY     Ordering Provider: Jami Smith PA-C   Interpreting Provider: Nicolas Ponce MD  Performing nurse/tech/RT: VT, RT  Diagnosis:  (Jami Smith PA-C)  Height: 5' 10" (177.8 cm)  Weight: 77 kg (169 lb 12.1 oz)  BMI (Calculated): 24.4   Patient Race:             Phase Oxygen Assessment Supplemental O2 Heart   Rate Blood Pressure Gladys Dyspnea Scale Rating   Resting 95 % Room Air 102 bpm 118/66 3   Exercise        Minute        1 90 % Room Air 114 bpm     2 90 % Room Air 119 bpm     3 90 % Room Air 121 bpm     4 89 % Room Air 124 bpm     5 89 % Room Air 127 bpm     6  88 % Room Air 125 bpm 151/70 7-8   Recovery        Minute        1 94 % 2 L/M 110 bpm     2 95 % 2 L/M 103 bpm     3 95 % 2 L/M 103 bpm     4 95 % 2 L/M 102 bpm 130/76 3     Six Minute Walk Summary  6MWT Status: completed without stopping  Patient Reported: Dyspnea     Interpretation:  Did the patient stop or pause?: No                                         Total Time Walked (Calculated): 360 seconds  Final Partial Lap Distance (feet): 175 feet  Total Distance Meters (Calculated): 419.1 meters  Predicted Distance Meters (Calculated): 495.91 meters  Percentage of Predicted (Calculated): 84.51  Peak VO2 (Calculated): 16.55  Mets: 4.73  Has The Patient Had a Previous Six Minute Walk Test?: No       Previous 6MWT Results  Has The Patient Had a Previous Six Minute Walk Test?: No    Patient is scheduled to see Dr. Ponce for qualifying O2 at this time; Pt does not have home O2.    Interpretation:  Total distance walked in six minutes is normal indicating normal functional capacity. There was significant severe oxygen desaturation to 88 % at rest on room air prior to exercise (oxygen saturation less than 89%).The patient was exercised with supplemental oxygen as noted above. Clinical correlation suggested.  Patient met criteria for oxygen prescription. Clinical correlation " suggested.    [] Mild exercise-induced hypoxemia described as an arterial oxygen saturation of 93-95% (or 3-4% less than at rest),  [] Moderate exercise-induced hypoxemia as 89-93%  [x] Severe exercise induced hypoxemia as < 89% O2 saturation.  Medicare Criteria for oxygen prescription comments:   When arterial oxygen saturation is at or below 88% during exercise on room air (severe exercise induced hypoxemia) then the patient falls under Medicare Group 1 criteria for supplemental oxygen prescription.  Details about Medicare Group Criteria coverage can be found at http://www.cms.[facility].gov/manuals/downloads/     Nicolas Ponce MD

## 2024-03-12 DIAGNOSIS — J41.0 SIMPLE CHRONIC BRONCHITIS: ICD-10-CM

## 2024-03-12 DIAGNOSIS — J41.1 CHRONIC BRONCHITIS, MUCOPURULENT: ICD-10-CM

## 2024-03-12 RX ORDER — IPRATROPIUM BROMIDE AND ALBUTEROL SULFATE 2.5; .5 MG/3ML; MG/3ML
3 SOLUTION RESPIRATORY (INHALATION) EVERY 6 HOURS PRN
Qty: 300 ML | Refills: 11 | Status: SHIPPED | OUTPATIENT
Start: 2024-03-12 | End: 2024-04-10 | Stop reason: SDUPTHER

## 2024-03-13 ENCOUNTER — TELEPHONE (OUTPATIENT)
Dept: HEMATOLOGY/ONCOLOGY | Facility: CLINIC | Age: 64
End: 2024-03-13
Payer: COMMERCIAL

## 2024-03-13 ENCOUNTER — OFFICE VISIT (OUTPATIENT)
Dept: HEMATOLOGY/ONCOLOGY | Facility: CLINIC | Age: 64
End: 2024-03-13
Payer: COMMERCIAL

## 2024-03-13 ENCOUNTER — DOCUMENTATION ONLY (OUTPATIENT)
Dept: HEMATOLOGY/ONCOLOGY | Facility: CLINIC | Age: 64
End: 2024-03-13

## 2024-03-13 VITALS
BODY MASS INDEX: 24.18 KG/M2 | DIASTOLIC BLOOD PRESSURE: 77 MMHG | OXYGEN SATURATION: 97 % | TEMPERATURE: 97 F | HEIGHT: 70 IN | SYSTOLIC BLOOD PRESSURE: 130 MMHG | HEART RATE: 101 BPM | WEIGHT: 168.88 LBS | RESPIRATION RATE: 20 BRPM

## 2024-03-13 DIAGNOSIS — C79.51 SECONDARY MALIGNANT NEOPLASM OF BONE: ICD-10-CM

## 2024-03-13 DIAGNOSIS — C34.90 EGFR-RELATED LUNG CANCER: ICD-10-CM

## 2024-03-13 DIAGNOSIS — C78.2 SECONDARY MALIGNANCY OF PARIETAL PLEURA: ICD-10-CM

## 2024-03-13 DIAGNOSIS — T45.1X5A CHEMOTHERAPY INDUCED PULMONARY TOXICITY: ICD-10-CM

## 2024-03-13 DIAGNOSIS — T45.1X5A IMMUNODEFICIENCY DUE TO CHEMOTHERAPY: ICD-10-CM

## 2024-03-13 DIAGNOSIS — J98.4 CHEMOTHERAPY INDUCED PULMONARY TOXICITY: ICD-10-CM

## 2024-03-13 DIAGNOSIS — C34.12 MALIGNANT NEOPLASM OF UPPER LOBE OF LEFT LUNG: Primary | ICD-10-CM

## 2024-03-13 DIAGNOSIS — Z79.899 IMMUNODEFICIENCY DUE TO CHEMOTHERAPY: ICD-10-CM

## 2024-03-13 DIAGNOSIS — D84.821 IMMUNODEFICIENCY DUE TO CHEMOTHERAPY: ICD-10-CM

## 2024-03-13 PROBLEM — J18.9 CAP (COMMUNITY ACQUIRED PNEUMONIA): Status: RESOLVED | Noted: 2024-02-28 | Resolved: 2024-03-13

## 2024-03-13 PROCEDURE — 3044F HG A1C LEVEL LT 7.0%: CPT | Mod: CPTII,S$GLB,, | Performed by: INTERNAL MEDICINE

## 2024-03-13 PROCEDURE — 3008F BODY MASS INDEX DOCD: CPT | Mod: CPTII,S$GLB,, | Performed by: INTERNAL MEDICINE

## 2024-03-13 PROCEDURE — 1159F MED LIST DOCD IN RCRD: CPT | Mod: CPTII,S$GLB,, | Performed by: INTERNAL MEDICINE

## 2024-03-13 PROCEDURE — 1160F RVW MEDS BY RX/DR IN RCRD: CPT | Mod: CPTII,S$GLB,, | Performed by: INTERNAL MEDICINE

## 2024-03-13 PROCEDURE — 4010F ACE/ARB THERAPY RXD/TAKEN: CPT | Mod: CPTII,S$GLB,, | Performed by: INTERNAL MEDICINE

## 2024-03-13 PROCEDURE — 3078F DIAST BP <80 MM HG: CPT | Mod: CPTII,S$GLB,, | Performed by: INTERNAL MEDICINE

## 2024-03-13 PROCEDURE — 99999 PR PBB SHADOW E&M-EST. PATIENT-LVL V: CPT | Mod: PBBFAC,,, | Performed by: INTERNAL MEDICINE

## 2024-03-13 PROCEDURE — 1111F DSCHRG MED/CURRENT MED MERGE: CPT | Mod: CPTII,S$GLB,, | Performed by: INTERNAL MEDICINE

## 2024-03-13 PROCEDURE — 99214 OFFICE O/P EST MOD 30 MIN: CPT | Mod: S$GLB,,, | Performed by: INTERNAL MEDICINE

## 2024-03-13 PROCEDURE — 3075F SYST BP GE 130 - 139MM HG: CPT | Mod: CPTII,S$GLB,, | Performed by: INTERNAL MEDICINE

## 2024-03-13 NOTE — NURSING
1539 pm: Outgoing call to pt regarding [facility] referral per Dr. Cheney for Dr. Adams for lung cancer. Spoke with pt. Pt aware of referral request. Pt scheduled with Dr. Adams on 3/18 at 130 pm at [place]. Pt given location directions. Pt verbalized understanding. Pt plan to report to appt as scheduled.   Oncology Navigation   Intake  Date of Diagnosis: 02/01/23 (at [facility])  Cancer Type: Other  Type of Referral: External (self-referral for 2nd opinion)  Date of Referral: 08/30/23  Initial Nurse Navigator Contact: 09/27/23  Referral to Initial Contact Timeline (days): 28  Date Worked: 03/13/24  Appointment Date: 09/08/23  Schedule to Appointment Timeline (days): -31  Multiple appointments: No  Start of Treatment: 10/23/23 (started oral Tagrisso)     Treatment  Current Status: Active       Medical Oncologist: Dr. Zenon Cheney  Oral Therapy: Planned (Osimertinib (Tagrisso))       Procedures: PET scan  PET Scan Schedule Date: 10/12/23                 Acuity      Follow Up  No follow-ups on file.

## 2024-03-13 NOTE — PROGRESS NOTES
ZORAIDAR was consulted to meet with patient on today in clinic. Pt provided SWER a letter from her insurance denying her Tempus test. SWER will send to appropriate party to assist. Pt also asked about sending her claims to [employer] and [employer]. SWER will send to financial counselor to assist. SWER will remain available.

## 2024-03-13 NOTE — PROGRESS NOTES
Subjective:       Patient ID: JACOBO COLEMAN is a 63 y.o. female.    Chief Complaint: Results, Lung Cancer, and Chemotherapy    HPI:  63-year-old female returns recent hospitalization for presumptive diagnosis of pulmonary toxicity secondary to OP OSIMERTINIB DAILY patient is currently on 40 mg of prednisone with 20 mg tapered weekly.  Followed by Pulmonary Medicine is seen today for further evaluation and treatment recommendations ECOG status 1    Past Medical History:   Diagnosis Date    Malignant neoplasm of upper lobe of left lung 2023    Thyroid disease      Family History   Problem Relation Age of Onset    Heart failure Mother     Diabetes Mother     Rheum arthritis Father     Diabetes Brother      Social History     Socioeconomic History    Marital status:    Tobacco Use    Smoking status: Former     Current packs/day: 0.00     Types: Cigarettes     Quit date:      Years since quittin.2     Passive exposure: Never    Smokeless tobacco: Never   Substance and Sexual Activity    Alcohol use: Yes     Alcohol/week: 2.0 standard drinks of alcohol     Types: 2 Glasses of wine per week     Comment: socially    Drug use: Not Currently    Sexual activity: Not Currently   Social History Narrative    ** Merged History Encounter **          Social Determinants of Health     Financial Resource Strain: Low Risk  (2023)    Overall Financial Resource Strain (CARDIA)     Difficulty of Paying Living Expenses: Not hard at all   Food Insecurity: No Food Insecurity (2023)    Hunger Vital Sign     Worried About Running Out of Food in the Last Year: Never true     Ran Out of Food in the Last Year: Never true   Transportation Needs: No Transportation Needs (2023)    PRAPARE - Transportation     Lack of Transportation (Medical): No     Lack of Transportation (Non-Medical): No   Physical Activity: Sufficiently Active (2023)    Exercise Vital Sign     Days of Exercise per Week: 5 days      "Minutes of Exercise per Session: 30 min   Stress: No Stress Concern Present (12/25/2023)    [employer] [place] [employer] - Occupational Stress Questionnaire     Feeling of Stress : Not at all   Social Connections: Unknown (12/25/2023)    Social Connection and Isolation Panel [NHANES]     Frequency of Communication with Friends and Family: More than three times a week     Frequency of Social Gatherings with Friends and Family: Once a week     Active Member of Clubs or Organizations: Yes     Attends Club or Organization Meetings: More than 4 times per year     Marital Status: Living with partner   Housing Stability: Low Risk  (12/25/2023)    Housing Stability Vital Sign     Unable to Pay for Housing in the Last Year: No     Number of Places Lived in the Last Year: 1     Unstable Housing in the Last Year: No     Past Surgical History:   Procedure Laterality Date    CHOLECYSTECTOMY      ENDOBRONCHIAL ULTRASOUND Left 1/5/2023    Procedure: ENDOBRONCHIAL ULTRASOUND (EBUS);  Surgeon: Bam Catalan MD;  Location: [facility];  Service: Pulmonary;  Laterality: Left;    HYSTERECTOMY      NAVIGATIONAL BRONCHOSCOPY Left 1/5/2023    Procedure: BRONCHOSCOPY, NAVIGATIONAL;  Surgeon: Bam Catalan MD;  Location: [facility] ENDO;  Service: Pulmonary;  Laterality: Left;       Labs:  Lab Results   Component Value Date    WBC 6.12 03/01/2024    HGB 9.5 (L) 03/01/2024    HCT 30.6 (L) 03/01/2024    MCV 84 03/01/2024     03/01/2024     BMP  Lab Results   Component Value Date     03/01/2024    K 4.5 03/01/2024     03/01/2024    CO2 22 (L) 03/01/2024    BUN 18 03/01/2024    CREATININE 1.1 03/01/2024    CALCIUM 8.6 (L) 03/01/2024    ANIONGAP 11 03/01/2024     Lab Results   Component Value Date    ALT 35 02/28/2024    AST 37 02/28/2024    ALKPHOS 91 02/28/2024    BILITOT 0.2 02/28/2024       No results found for: "IRON", "TIBC", "FERRITIN", "SATURATEDIRO"  No results found for: "EDSDJVJP92"  No results found for: " ""FOLATE"  Lab Results   Component Value Date    TSH 0.264 (L) 02/28/2024         Review of Systems   Constitutional:  Negative for activity change, appetite change, chills, diaphoresis, fatigue, fever and unexpected weight change.   HENT:  Negative for congestion, dental problem, drooling, ear discharge, ear pain, facial swelling, hearing loss, mouth sores, nosebleeds, postnasal drip, rhinorrhea, sinus pressure, sneezing, sore throat, tinnitus, trouble swallowing and voice change.    Eyes:  Negative for photophobia, pain, discharge, redness, itching and visual disturbance.   Respiratory:  Negative for cough, choking, chest tightness, shortness of breath, wheezing and stridor.    Cardiovascular:  Negative for chest pain, palpitations and leg swelling.   Gastrointestinal:  Negative for abdominal distention, abdominal pain, anal bleeding, blood in stool, constipation, diarrhea, nausea, rectal pain and vomiting.   Endocrine: Negative for cold intolerance, heat intolerance, polydipsia, polyphagia and polyuria.   Genitourinary:  Negative for decreased urine volume, difficulty urinating, dyspareunia, dysuria, enuresis, flank pain, frequency, genital sores, hematuria, menstrual problem, pelvic pain, urgency, vaginal bleeding, vaginal discharge and vaginal pain.   Musculoskeletal:  Negative for arthralgias, back pain, gait problem, joint swelling, myalgias, neck pain and neck stiffness.   Skin:  Negative for color change, pallor and rash.   Allergic/Immunologic: Negative for environmental allergies, food allergies and immunocompromised state.   Neurological:  Negative for dizziness, tremors, seizures, syncope, facial asymmetry, speech difficulty, weakness, light-headedness, numbness and headaches.   Hematological:  Negative for adenopathy. Does not bruise/bleed easily.   Psychiatric/Behavioral:  Negative for agitation, behavioral problems, confusion, decreased concentration, dysphoric mood, hallucinations, self-injury, sleep " disturbance and suicidal ideas. The patient is nervous/anxious. The patient is not hyperactive.        Objective:      Physical Exam  Vitals reviewed.   Constitutional:       General: She is not in acute distress.     Appearance: She is well-developed. She is not diaphoretic.   HENT:      Head: Normocephalic and atraumatic.      Right Ear: External ear normal.      Left Ear: External ear normal.      Nose: Nose normal.      Right Sinus: No maxillary sinus tenderness or frontal sinus tenderness.      Left Sinus: No maxillary sinus tenderness or frontal sinus tenderness.      Mouth/Throat:      Pharynx: No oropharyngeal exudate.   Eyes:      General: Lids are normal. No scleral icterus.        Right eye: No discharge.         Left eye: No discharge.      Conjunctiva/sclera: Conjunctivae normal.      Right eye: Right conjunctiva is not injected. No hemorrhage.     Left eye: Left conjunctiva is not injected. No hemorrhage.     Pupils: Pupils are equal, round, and reactive to light.   Neck:      Thyroid: No thyromegaly.      Vascular: No JVD.      Trachea: No tracheal deviation.   Cardiovascular:      Rate and Rhythm: Normal rate.   Pulmonary:      Effort: Pulmonary effort is normal. No respiratory distress.      Breath sounds: No stridor.   Chest:      Chest wall: No tenderness.   Abdominal:      General: Bowel sounds are normal. There is no distension.      Palpations: Abdomen is soft. There is no hepatomegaly, splenomegaly or mass.      Tenderness: There is no abdominal tenderness. There is no rebound.   Musculoskeletal:         General: No tenderness. Normal range of motion.      Cervical back: Normal range of motion and neck supple.   Lymphadenopathy:      Cervical: No cervical adenopathy.      Upper Body:      Right upper body: No supraclavicular adenopathy.      Left upper body: No supraclavicular adenopathy.   Skin:     General: Skin is dry.      Findings: No erythema or rash.   Neurological:      Mental Status:  She is alert and oriented to person, place, and time.      Cranial Nerves: No cranial nerve deficit.      Coordination: Coordination normal.   Psychiatric:         Behavior: Behavior normal.         Thought Content: Thought content normal.         Judgment: Judgment normal.             Assessment:      1. Malignant neoplasm of upper lobe of left lung    2. EGFR-related lung cancer    3. Chemotherapy induced pulmonary toxicity    4. Immunodeficiency due to chemotherapy    5. Secondary malignancy of parietal pleura    6. Secondary malignant neoplasm of bone           Med Onc Chart Routing      Follow up with physician    Follow up with [facility]    Infusion scheduling note    Injection scheduling note    Labs    Imaging    Pharmacy appointment    Other referrals         Would like to be seen by Dr. GALLEGOS next week for review              Plan:     Reviewed information I spoke with Dr. Bam Catalan as well.  At this time continuing tapering doses of prednisone I have sent the patient article on pulmonary toxicity sent secondary to OP OSIMERTINIB DAILY .  At this time the patient understands this is a rare complication of the medication is very highly efficacious EGD RF therapeutic options.  That we temp but I would like case reviewed by lung specialist Dr. Gallegos let to make recommendations.  Discussed above        Zenon Cheney Jr, MD FACP

## 2024-03-18 ENCOUNTER — OFFICE VISIT (OUTPATIENT)
Dept: HEMATOLOGY/ONCOLOGY | Facility: CLINIC | Age: 64
End: 2024-03-18
Payer: COMMERCIAL

## 2024-03-18 VITALS
RESPIRATION RATE: 20 BRPM | WEIGHT: 170.19 LBS | HEART RATE: 84 BPM | OXYGEN SATURATION: 96 % | BODY MASS INDEX: 24.37 KG/M2 | SYSTOLIC BLOOD PRESSURE: 135 MMHG | TEMPERATURE: 98 F | HEIGHT: 70 IN | DIASTOLIC BLOOD PRESSURE: 85 MMHG

## 2024-03-18 DIAGNOSIS — J98.4 CHEMOTHERAPY INDUCED PULMONARY TOXICITY: Primary | ICD-10-CM

## 2024-03-18 DIAGNOSIS — C34.90 EGFR-RELATED LUNG CANCER: ICD-10-CM

## 2024-03-18 DIAGNOSIS — C34.12 MALIGNANT NEOPLASM OF UPPER LOBE OF LEFT LUNG: ICD-10-CM

## 2024-03-18 DIAGNOSIS — T45.1X5A CHEMOTHERAPY INDUCED PULMONARY TOXICITY: Primary | ICD-10-CM

## 2024-03-18 PROCEDURE — 99215 OFFICE O/P EST HI 40 MIN: CPT | Mod: S$GLB,,, | Performed by: HOSPITALIST

## 2024-03-18 PROCEDURE — 99999 PR PBB SHADOW E&M-EST. PATIENT-LVL V: CPT | Mod: PBBFAC,,, | Performed by: HOSPITALIST

## 2024-03-18 PROCEDURE — 3044F HG A1C LEVEL LT 7.0%: CPT | Mod: CPTII,S$GLB,, | Performed by: HOSPITALIST

## 2024-03-18 PROCEDURE — 3075F SYST BP GE 130 - 139MM HG: CPT | Mod: CPTII,S$GLB,, | Performed by: HOSPITALIST

## 2024-03-18 PROCEDURE — 3008F BODY MASS INDEX DOCD: CPT | Mod: CPTII,S$GLB,, | Performed by: HOSPITALIST

## 2024-03-18 PROCEDURE — 3079F DIAST BP 80-89 MM HG: CPT | Mod: CPTII,S$GLB,, | Performed by: HOSPITALIST

## 2024-03-18 PROCEDURE — 1159F MED LIST DOCD IN RCRD: CPT | Mod: CPTII,S$GLB,, | Performed by: HOSPITALIST

## 2024-03-18 PROCEDURE — 1111F DSCHRG MED/CURRENT MED MERGE: CPT | Mod: CPTII,S$GLB,, | Performed by: HOSPITALIST

## 2024-03-18 PROCEDURE — 4010F ACE/ARB THERAPY RXD/TAKEN: CPT | Mod: CPTII,S$GLB,, | Performed by: HOSPITALIST

## 2024-03-18 NOTE — PROGRESS NOTES
The formerly [facility] and [facility] MEDICAL ONCOLOGY - NEW PATIENT VISIT    Reason for visit: Second opinion for stage IV adenocarcinoma of the lung      Oncology History   Adenocarcinoma of upper lobe of left lung   1/5/2023 Procedure    Bronchoscopy  JAZMYNE Biopsy  - Adenocarcinoma, (TTF-1 positive, p40 negative)    [facility] N[facility]  - EGFR L858R mutation positive, VAF 34.8%  - PD-L1 5%     2/1/2023 Procedure    Attempted JAZMYNE Resection, Aborted for Pleural / Diaphragmatic Involvement  1. Diaphragm nodules, biopsy:                                               - Moderately differentiated adenocarcinoma.                           2. Parietal pleura, biopsies:                                               - Moderately differentiated adenocarcinoma.                           3. Pericardial nodule, biopsy:                                              - Moderately differentiated adenocarcinoma, see Comment.                 Comment; Immunohistochemistry was performed on tissue block 3A.         The adenocarcinoma is positive for TTF-1, Austin-EP4, cytokeratin 7        and Napsin-A. Calretinin, cytokeratin 20 and cytokeratin 5/6 are        negative. This immunophenotype supports pulmonary origin.              4. Diaphragm nodule, biopsy:                                                - Atypical TTF-1 positive cells, cannot exclude malignancy.           5. Left pleural fluid for cytology (ThinPrep, two cytospins, and           cell block):                                                             - Positive for malignant cells, consistent with metastatic             adenocarcinoma, pulmonary origin.                                         Comment; Immunohistochemistry was performed on cell block 5A.           The malignant cells are positive for Austin-EP4 and TTF-1.           [facility] NGS (sent 9/23/23)  - EGFR L858R (VAF 46%), CTNNB1, CKS1B     9/8/2023 Cancer Staged    Staging form: Lung, AJCC 8th Edition  - Clinical  "stage from 9/8/2023: Stage IV (ycT2, cN1, cM1)     2/28/2024 Imaging Significant Findings    CT C, Non-Con  - Interval development of extensive multifocal groundglass infiltrates in b/l upper lobes, lower lobes and right middle lobe   - 2.6 x 1.4 cm spiculated nodule in the left upper lobe with surrounding scarring, previously 3.0 x 1.7 cm   - 2 nodular infiltrates in the right lung base  - Stable sclerotic bone lesions in the thoracic spine consistent with osteoblastic metastasis      2/28/2024 - 3/1/2024 Hospital Admission    Admitted for SOB. Had not improved on doxycycline and prednisone. Started of levofloxacin.      EGFR-related lung cancer   10/5/2023 Initial Diagnosis    EGFR-related lung cancer     10/6/2023 -  Chemotherapy    Treatment Summary   Plan Name: OP OSIMERTINIB DAILY  Treatment Goal: Control  Status: Active  Start Date: 10/6/2023  End Date: 10/6/2023  Provider: Zenon Cheney MD  Chemotherapy: osimertinib (TAGRISSO) 80 mg Tab, 80 mg, Oral, Daily, 1 of 1 cycle, Start date: 10/6/2023, End date: --        Per Dr. Fields, 8/28/23:   "Mrs. Hancock is a 62-year-old lady with a history that dates to late 2022 when she had upper respiratory infection. This prompted medical attention that resulted in a chest x-ray revealing a left upper lobe mass. This was followed by CT of the chest that confirmed a left upper lobe mass with additional satellite nodules of the lobe as well as nodules of the left pleura. She also had a nodule noted at the right base. She was sent for PET CT scan and ultimately had bronchoscopic biopsy. The left upper lobe was confirmed to be consistent with adenocarcinoma of lung primary.    She was sent to me for recommendations in preparation for an attempt at definitive surgery. Unfortunately, during her attempt for surgical resection, she was found to have metastatic disease to the pleural space with malignant involvement of pleural effusion.     She went on to complete 4 cycles of " palliative chemotherapy with carboplatin, pemetrexed, and pembrolizumab with mixed CT findings resulting in recommendations for continuation of chemotherapy with pemetrexed and pembrolizumab. She has remained on pemetrexed and pembrolizumab for ongoing palliative therapy.     Patient presents to clinic in f/u for lung cancer prior to continuation of systemic therapy. She continues maintenance pemetrexed plus pembrolizumab. Since her previous appointment she did have dermatology evaluation of a rash who performed punch biopsy. She was initiated on steroid topical by dermatology with improvement. In the interim she also reported recent increase in frontal headaches that resulted in ordering of MRI of the brain. Other than above noted rash and headache she has continued to tolerate palliative Pembro plus pemetrexed well. She currently denies any pain. She denies any shortness of breath. She denies any nausea or vomiting. She denies any diarrhea or constipation.    1. Metastatic left lung adenocarcinoma: She has completed 4 cycles of palliative chemotherapy with carboplatin, pemetrexed, and pembrolizumab with mixed CT findings resulting in recommendations for continuation of chemotherapy with pemetrexed and pembrolizumab. She has continued pemetrexed plus pembrolizumab for maintenance palliative therapy with positive radiographic response. CT scan results discussed in detail with patient. Multiple questions were asked by patient during discussion of CT scan results, all of which were answered in detail to her apparent satisfaction. With continued positive radiographic response and without dermatologic evaluation suggest immune mediated skin reaction, recommendations made for patient to continue pemetrexed plus pembrolizumab for ongoing palliative maintenance therapy. Patient verbalized understanding and agrees with recommendations as made.    Once again discussed the need to refrain from the use of systemic steroid  "therapy with immunotherapy given the theoretical risk that systemic steroid could augment the T-cell response.     2. Rash - s/p punch biopsy of left distal pretibial region on 8/3/2023 pathologic consistent with spongiotic dermatitis with eosinophils; negative for medication reaction per dermatology. She will continue topical management per dermatology recommendations. "     HPI:     JACOBO COLEMAN is a 63 y.o. 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 Pt is also s/p palliative XRT to T6-T9 vertebrae and adacent paravertebral L rib lesions (22.5 Gy/% Fx, EOT 1/18/24) Pt was previously treated by Dr. Hossein Fields ([facility]) and Dr. Cheney.    Last note, Dr. Cheney, 3/13/24: Currently on 40 mg of prednisone, tapering. Discussed with pulmonology. Favor symptoms are 2/2 osimertinib.     Pt states that she is feeling significantly better over the past 1 week, noting improved appetite and SOB. She continues to do two breathing treatments (morning and evening). She is able to clean her house without dyspnea which is a new improvement, and her friend confirms that she looks much better than over recent weeks. She has not been using oxygen, though this was ordered for her. She also notes an improving cough, which is worst in the morning and at night (after her treatments). She notes that he pain in her back has improved significantly since her palliative radiation therapy, though notes that she continues to have discomfort in her LUQ.    Today is her last day of prednisone 20 mg BID, " then she will transition to 20 mg daily for 2 weeks.      Living Situation: She lives in [place] with her partner and her grandchild. She does not have a flight of stairs in her house.     Tobacco Use: She smoked from age 13 until 29, maybe 1/2 pack at her heaviest.     EtOH Use: Social.     Employment / Exposure History: She is employed as a hospice nurse, and was previously employed as an ER and ICU nurse.     Family Cancer History: Her maternal grandmother may have had brain cancer, but she otherwise denies any known history of cancer in her family.     History has been obtained by chart review and discussion with the patient.    Past Medical History:   Past Medical History:   Diagnosis Date    Malignant neoplasm of upper lobe of left lung 2023    Thyroid disease         Past Surgical History:   Past Surgical History:   Procedure Laterality Date    CHOLECYSTECTOMY      ENDOBRONCHIAL ULTRASOUND Left 2023    Procedure: ENDOBRONCHIAL ULTRASOUND (EBUS);  Surgeon: Bam Catalan MD;  Location: [facility];  Service: Pulmonary;  Laterality: Left;    HYSTERECTOMY      NAVIGATIONAL BRONCHOSCOPY Left 2023    Procedure: BRONCHOSCOPY, NAVIGATIONAL;  Surgeon: Bam Catalan MD;  Location: [facility];  Service: Pulmonary;  Laterality: Left;        Family History:   Family History   Problem Relation Age of Onset    Heart failure Mother     Diabetes Mother     Rheum arthritis Father     Diabetes Brother         Social History:   Social History     Tobacco Use    Smoking status: Former     Current packs/day: 0.00     Types: Cigarettes     Quit date:      Years since quittin.2     Passive exposure: Never    Smokeless tobacco: Never   Substance Use Topics    Alcohol use: Yes     Alcohol/week: 2.0 standard drinks of alcohol     Types: 2 Glasses of wine per week     Comment: socially        I have reviewed and updated the patient's past medical, surgical, family and social histories.      ROS:   As per HPI.      Allergies:   Review of patient's allergies indicates:   Allergen Reactions    Xylocaine with epinephrine [lidocaine-epinephrine] Anaphylaxis    Lipitor [atorvastatin] Other (See Comments)     Body aches      Methocarbamol-aspirin     Zetia [ezetimibe] Other (See Comments)     Muscle spasms      Augmentin [amoxicillin-pot clavulanate] Rash    Macrodantin [nitrofurantoin macrocrystal] Rash    Omnicef [cefdinir] Rash and Other (See Comments)     GI upset    Pravastatin Rash    Sulfa (sulfonamide antibiotics) Rash        Medications:   Current Outpatient Medications   Medication Sig Dispense Refill    albuterol (VENTOLIN HFA) 90 mcg/actuation inhaler Inhale 2 puffs into the lungs every 6 (six) hours as needed for Wheezing. Rescue 18 g 3    albuterol-ipratropium (DUO-NEB) 2.5 mg-0.5 mg/3 mL nebulizer solution Take 3 mLs by nebulization every 6 (six) hours as needed for Wheezing or Shortness of Breath. Rescue 300 mL 11    azelastine (ASTELIN) 137 mcg (0.1 %) nasal spray 1 spray by Nasal route 2 (two) times daily.      budesonide-glycopyr-formoterol 160-9-4.8 mcg/actuation HFAA Inhale 2 puffs into the lungs 2 (two) times daily. Wash out mouth after use 10.7 g 11    cetirizine (ZYRTEC) 10 MG tablet Take 5 mg by mouth once daily.      cycloSPORINE (RESTASIS) 0.05 % ophthalmic emulsion Place 1 drop into both eyes 2 (two) times a day.      eszopiclone (LUNESTA) 3 mg Tab Take 3 mg by mouth every evening.      FLUoxetine 20 MG capsule Take 20 mg by mouth once daily.      fluticasone propionate (FLONASE) 50 mcg/actuation nasal spray 1 spray by Each Nostril route 2 (two) times daily.      ibuprofen (ADVIL,MOTRIN) 200 MG tablet Take by mouth every 6 (six) hours as needed.      ipratropium (ATROVENT) 21 mcg (0.03 %) nasal spray SMARTSI Spray(s) Both Nares 2-3 Times Daily      levothyroxine (SYNTHROID) 88 MCG tablet Take 88 mcg by mouth every evening.      multivitamin capsule Take 1 capsule by mouth once daily.      mupirocin  "(BACTROBAN) 2 % ointment Apply topically 3 (three) times daily.      ondansetron (ZOFRAN-ODT) 4 MG TbDL Take 2 mg by mouth every 6 (six) hours as needed.      pantoprazole (PROTONIX) 40 MG tablet Take 1 tablet by mouth every morning.      [START ON 3/20/2024] predniSONE (DELTASONE) 20 MG tablet Take 1 tablet (20 mg total) by mouth once daily. 14 tablet 0    promethazine (PHENERGAN) 25 MG tablet Take 25 mg by mouth every 6 (six) hours as needed.      hydrOXYzine HCL (ATARAX) 25 MG tablet Take 25 mg by mouth once daily.      levoFLOXacin (LEVAQUIN) 750 MG tablet Take 1 tablet (750 mg total) by mouth once daily. 3 tablet 0    OLANZapine (ZYPREXA) 5 MG tablet Take 5 mg by mouth every evening.      osimertinib (TAGRISSO) 80 mg Tab Take 1 tab (80 mg) by mouth once daily. 30 tablet 11    predniSONE (DELTASONE) 20 MG tablet Take 2 tablets (40 mg total) by mouth once daily. for 14 days 28 tablet 0    varenicline 0.03 mg/spray sprm 1 spray by Each Nostril route 2 (two) times daily.       No current facility-administered medications for this visit.          Physical Exam:       /85   Pulse 84   Temp 97.6 °F (36.4 °C) (Temporal)   Resp 20   Ht 5' 10" (1.778 m)   Wt 77.2 kg (170 lb 3.1 oz)   SpO2 96%   BMI 24.42 kg/m²                Physical Exam  Constitutional:       Appearance: Normal appearance.   HENT:      Head: Normocephalic and atraumatic.   Eyes:      Extraocular Movements: Extraocular movements intact.      Conjunctiva/sclera: Conjunctivae normal.      Pupils: Pupils are equal, round, and reactive to light.   Cardiovascular:      Rate and Rhythm: Normal rate and regular rhythm.      Heart sounds: No murmur heard.     No friction rub. No gallop.   Pulmonary:      Effort: Pulmonary effort is normal.      Breath sounds: No wheezing, rhonchi or rales.   Musculoskeletal:         General: Normal range of motion.   Skin:     General: Skin is warm and dry.   Neurological:      Mental Status: She is alert and " oriented to person, place, and time.   Psychiatric:         Mood and Affect: Mood normal.         Thought Content: Thought content normal.         Judgment: Judgment normal.           Labs:   No results found for this or any previous visit (from the past 48 hour(s)).     Imaging:    See oncologic history above.     Path:  See oncologic history above.      Assessment and Plan:     JACOBO COLEMAN is a 63 y.o. 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 Pt is also s/p palliative XRT to T6-T9 vertebrae and adacent paravertebral L rib lesions (22.5 Gy/% Fx, EOT 1/18/24) Pt was previously treated by Dr. Hossein Fields ([facility]) and Dr. Cheney.    Stage IV Adenocarcinoma of the JAZMYNE, EGFR L858R Mutated, PD-L1 5%  ECOG PS 1 (due to dyspnea, improving).  Patient presents today as a second opinion.  Oncologic history as above.  Most recently, patient was admitted with worsening shortness of breath and found to have ground-glass opacities in her lungs bilaterally.  While admitted (2/28/24-3/1/24), the patient was treated for a pneumonia but her symptoms did not improve.  She saw Dr. 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  Etiology of her dyspnea and radiographic findings is unclear at this time.  They may be related to osimertinib which can lead to drug-related pneumonitis in up to 18% of patients, most often within the first 3 months of treatment, and can present with a variety  of radiographic findings (https://pubmed.ncbi.nlm.nih.gov/99734911/).  That said, the patient did also recently receive radiation therapy to T6 -T9 as well as adjacent paravertebral left rib and on review of imaging, the changes appear adjacent to these treatment areas; notably, her symptoms began around 4 weeks after treatment completion.  Both osimertinib-induced pneumonitis and radiation-induced pneumonitis should improve with steroids, as has been the case for this patient.  Will discuss with Radiation Oncology regarding distribution and likelihood of radiation induced pneumonitis (may also discuss at TB).  If deemed most likely that pneumonitis secondary to radiation treatment, may attempt careful rechallenge with osimertinib with close interval imaging.  More likely, if it remains unclear what caused pneumonitis, may instead consider transitioning to a second-generation TKI like afatinib which carries a reduced risk of pneumonitis (especially given recommendation to permanently discontinue osimertinib for ILD/pneumonitis per the package insert; https://www.sciencedirect.com/science/article/pii/B363487755037975V#:~:text=In%20relation%20to%20this%2C%20successful,patients%20with%20osimertinib%2Dinduced%20ILD.).  Will also obtain repeat imaging now. This was discussed with the patient and her friend who understand and agree with the plan.    PLAN:   -- Continue to hold osimertinib at this time  -- Repeat CT C in 1 week (last on 2/28/24)  -- Message sent to radiation oncology  -- RTC after repeat imaging    The above information has been reviewed with the patient and all questions have been answered to their apparent satisfaction.  They understand that they can call the clinic with any questions.    Marin Adams MD  Hematology/Oncology  [facility]      Route Chart for Scheduling    Med Onc Chart Routing      Follow up with physician . CT C on 2/25/24, RTC w/ me on 2/26/24.   Follow up with  REBEL    Infusion scheduling note    Injection scheduling note    Labs    Imaging    Pharmacy appointment    Other referrals                    Disclaimer: This note was prepared using voice recognition system and is likely to have sound alike errors and is not proof read.  Please call me with any questions.

## 2024-03-18 NOTE — Clinical Note
Afternoon all!  Dr. Caceres, this patient recently developed dyspnea and b/l ground glass opacities and has been on a course of steroids with good improvement. This is concerning for osimertinib-induced pnuemonitis, but the distribution of changes seems to me adjacent to her recent areas of radiation treatment and started around 4 weeks after the fact. Wondering your thoughts about the chances that these symptoms / radiographic findings are related to her radiation therapy? Considering possible rechallenge w/ osimertinib vs more likely transitioning to afatinib. Also planning for repeat CT C now.   Thanks! Jignesh

## 2024-03-19 ENCOUNTER — DOCUMENTATION ONLY (OUTPATIENT)
Dept: RADIATION ONCOLOGY | Facility: CLINIC | Age: 64
End: 2024-03-19
Payer: COMMERCIAL

## 2024-03-19 NOTE — PROGRESS NOTES
I have reviewed the case with Dr. Gallegos who reports development of symptomatic pneumonitis consistent with CT findings showing ground-glass changes.  On my review, there is congruence between the pattern of pneumonitis and the 50% line of her radiation treatment plan, representing 11.25 Gy total dose over 5 fractions.  She has responded to oral steroids.    Her case will be presented in tumor board Friday regarding a review of these findings and discussion regarding resumption of immunotherapy, which also carries risk of pneumonitis

## 2024-03-22 ENCOUNTER — TUMOR BOARD CONFERENCE (OUTPATIENT)
Dept: HEMATOLOGY/ONCOLOGY | Facility: CLINIC | Age: 64
End: 2024-03-22
Payer: COMMERCIAL

## 2024-03-22 ENCOUNTER — HOSPITAL ENCOUNTER (OUTPATIENT)
Dept: RADIOLOGY | Facility: HOSPITAL | Age: 64
Discharge: HOME | End: 2024-03-22
Attending: HOSPITALIST
Payer: COMMERCIAL

## 2024-03-22 DIAGNOSIS — T45.1X5A CHEMOTHERAPY INDUCED PULMONARY TOXICITY: ICD-10-CM

## 2024-03-22 DIAGNOSIS — J98.4 CHEMOTHERAPY INDUCED PULMONARY TOXICITY: ICD-10-CM

## 2024-03-22 DIAGNOSIS — C34.12 MALIGNANT NEOPLASM OF UPPER LOBE OF LEFT LUNG: ICD-10-CM

## 2024-03-22 PROCEDURE — 71260 CT THORAX DX C+: CPT | Mod: TC

## 2024-03-22 PROCEDURE — 71260 CT THORAX DX C+: CPT | Mod: 26,,, | Performed by: RADIOLOGY

## 2024-03-22 PROCEDURE — 25500020 PHARM REV CODE 255: Performed by: HOSPITALIST

## 2024-03-22 RX ADMIN — IOHEXOL 100 ML: 350 INJECTION, SOLUTION INTRAVENOUS at 08:03

## 2024-03-22 NOTE — PROGRESS NOTES
Tumor Board Documentation      JACOBO COLEMAN was presented by Marin Adams Iv, MD at our Tumor Board on 3/22/2024, which included representatives from (P) Medical Oncology, Navigation, Radiology, Hematology, Radiation Oncology, Surgical Oncology, Pathology, Genetics, Pulmonology, Urology, Gastrointestinal.    JACOBO currently presents as (P) a current patient with (P) Lung cancer, with history of the following treatments: (P) Adjuvant Radiation, Adjuvant Chemotherapy.    Additionally, we reviewed previous medical and familial history, history of present illness, and recent lab results along with all available histopathologic and imaging studies. The tumor board considered available treatment options and made the following recommendations:      Recommendations for Radiation Oncologists to do peer to peer with MD Rojas and for pt to follow up with Dr. Adams to discuss treatment options.     The following procedures/referrals were also placed: No orders of the defined types were placed in this encounter.      Clinical Trial Status: (P) Not discussed     National site-specific guidelines were discussed with respect to the case.    Tumor board is a meeting of clinicians from various specialty areas who evaluate and discuss patients for whom a multidisciplinary approach is being considered. Final determinations in the plan of care are those of the provider(s). The responsibility for follow up of recommendations given during tumor board is that of the provider.     Marin Adams Iv, MD

## 2024-03-25 ENCOUNTER — OFFICE VISIT (OUTPATIENT)
Dept: HEMATOLOGY/ONCOLOGY | Facility: CLINIC | Age: 64
End: 2024-03-25
Payer: COMMERCIAL

## 2024-03-25 VITALS
HEART RATE: 98 BPM | TEMPERATURE: 97 F | DIASTOLIC BLOOD PRESSURE: 72 MMHG | HEIGHT: 70 IN | BODY MASS INDEX: 24.68 KG/M2 | SYSTOLIC BLOOD PRESSURE: 118 MMHG | OXYGEN SATURATION: 90 % | RESPIRATION RATE: 20 BRPM | WEIGHT: 172.38 LBS

## 2024-03-25 DIAGNOSIS — T45.1X5A CHEMOTHERAPY INDUCED PULMONARY TOXICITY: ICD-10-CM

## 2024-03-25 DIAGNOSIS — J98.4 CHEMOTHERAPY INDUCED PULMONARY TOXICITY: ICD-10-CM

## 2024-03-25 DIAGNOSIS — C78.2 SECONDARY MALIGNANCY OF PARIETAL PLEURA: ICD-10-CM

## 2024-03-25 DIAGNOSIS — J70.0 RADIATION PNEUMONITIS: ICD-10-CM

## 2024-03-25 DIAGNOSIS — C34.12 ADENOCARCINOMA OF UPPER LOBE OF LEFT LUNG: Primary | ICD-10-CM

## 2024-03-25 DIAGNOSIS — C79.51 SECONDARY MALIGNANT NEOPLASM OF BONE: ICD-10-CM

## 2024-03-25 PROCEDURE — 1159F MED LIST DOCD IN RCRD: CPT | Mod: CPTII,S$GLB,, | Performed by: HOSPITALIST

## 2024-03-25 PROCEDURE — 3008F BODY MASS INDEX DOCD: CPT | Mod: CPTII,S$GLB,, | Performed by: HOSPITALIST

## 2024-03-25 PROCEDURE — 99215 OFFICE O/P EST HI 40 MIN: CPT | Mod: S$GLB,,, | Performed by: HOSPITALIST

## 2024-03-25 PROCEDURE — 99999 PR PBB SHADOW E&M-EST. PATIENT-LVL IV: CPT | Mod: PBBFAC,,, | Performed by: HOSPITALIST

## 2024-03-25 PROCEDURE — 4010F ACE/ARB THERAPY RXD/TAKEN: CPT | Mod: CPTII,S$GLB,, | Performed by: HOSPITALIST

## 2024-03-25 PROCEDURE — 3074F SYST BP LT 130 MM HG: CPT | Mod: CPTII,S$GLB,, | Performed by: HOSPITALIST

## 2024-03-25 PROCEDURE — 3078F DIAST BP <80 MM HG: CPT | Mod: CPTII,S$GLB,, | Performed by: HOSPITALIST

## 2024-03-25 PROCEDURE — 1111F DSCHRG MED/CURRENT MED MERGE: CPT | Mod: CPTII,S$GLB,, | Performed by: HOSPITALIST

## 2024-03-25 PROCEDURE — 3044F HG A1C LEVEL LT 7.0%: CPT | Mod: CPTII,S$GLB,, | Performed by: HOSPITALIST

## 2024-03-25 RX ORDER — PREDNISONE 10 MG/1
TABLET ORAL
Qty: 70 TABLET | Refills: 0 | Status: SHIPPED | OUTPATIENT
Start: 2024-03-25

## 2024-03-25 NOTE — PROGRESS NOTES
[facility] MEDICAL ONCOLOGY - NEW PATIENT VISIT    Reason for visit: Second opinion for stage IV adenocarcinoma of the lung      Oncology History   Adenocarcinoma of upper lobe of left lung   1/5/2023 Procedure    Bronchoscopy  JAZMYNE Biopsy  - Adenocarcinoma, (TTF-1 positive, p40 negative)    [facility] NGS  - EGFR L858R mutation positive, VAF 34.8%  - PD-L1 5%     2/1/2023 Procedure    Attempted JAZMYNE Resection, Aborted for Pleural / Diaphragmatic Involvement  1. Diaphragm nodules, biopsy:                                               - Moderately differentiated adenocarcinoma.                           2. Parietal pleura, biopsies:                                               - Moderately differentiated adenocarcinoma.                           3. Pericardial nodule, biopsy:                                              - Moderately differentiated adenocarcinoma, see Comment.                 Comment; Immunohistochemistry was performed on tissue block 3A.         The adenocarcinoma is positive for TTF-1, Austin-EP4, cytokeratin 7        and Napsin-A. Calretinin, cytokeratin 20 and cytokeratin 5/6 are        negative. This immunophenotype supports pulmonary origin.              4. Diaphragm nodule, biopsy:                                                - Atypical TTF-1 positive cells, cannot exclude malignancy.           5. Left pleural fluid for cytology (ThinPrep, two cytospins, and           cell block):                                                             - Positive for malignant cells, consistent with metastatic             adenocarcinoma, pulmonary origin.                                         Comment; Immunohistochemistry was performed on cell block 5A.           The malignant cells are positive for Austin-EP4 and TTF-1.           [facility] (sent 9/23/23)  - EGFR L858R (VAF 46%), CTNNB1, CKS1B     9/8/2023 Cancer Staged    Staging form: Lung, AJCC 8th Edition  - Clinical  stage from 9/8/2023: Stage IV (ycT2, cN1, cM1)     2/28/2024 Imaging Significant Findings    CT C, Non-Con  - Interval development of extensive multifocal groundglass infiltrates in b/l upper lobes, lower lobes and right middle lobe   - 2.6 x 1.4 cm spiculated nodule in the left upper lobe with surrounding scarring, previously 3.0 x 1.7 cm   - 2 nodular infiltrates in the right lung base  - Stable sclerotic bone lesions in the thoracic spine consistent with osteoblastic metastasis      2/28/2024 - 3/1/2024 Hospital Admission    Admitted for SOB. Had not improved on doxycycline and prednisone. Started of levofloxacin.      3/22/2024 Imaging Significant Findings    CT C  - Unchanged 1.7 cm JAZMYNE lesion  - Interval reduction in ground-glass opacities, w/ residual architectural distortion and pleuroparenchymal scarring most compatible w/ sequela of prior viral pneumonitis  - Mild consolidation in the superior segment LLL  -      3/22/2024 Tumor Conference    [facility] Tumor Board  Difficult to determine extent of radiation vs drug-induced pneumonitis. There appear to be ground glass opacities outside of the treatment field, which would argue somewhat against a pure radiation pneumonitis.     Recommendations for Radiation Oncologists to do peer to peer with MD Rojas and for pt to follow up with Dr. Adams to discuss treatment options.        EGFR-related lung cancer   10/5/2023 Initial Diagnosis    EGFR-related lung cancer     10/6/2023 -  Chemotherapy    Treatment Summary   Plan Name: OP OSIMERTINIB DAILY  Treatment Goal: Control  Status: Active  Start Date: 10/6/2023  End Date: 10/6/2023  Provider: Zenon Cheney MD  Chemotherapy: osimertinib (TAGRISSO) 80 mg Tab, 80 mg, Oral, Daily, 1 of 1 cycle, Start date: 10/6/2023, End date: --     3/22/2024 Tumor Conference    [facility] Tumor Board  Difficult to determine extent of radiation vs drug-induced pneumonitis. There appear to be ground glass opacities outside of the treatment  "field, which would argue somewhat against a pure radiation pneumonitis.     Recommendations for Radiation Oncologists to do peer to peer with MD Rojas and for pt to follow up with Dr. Adams to discuss treatment options.           Per Dr. Fields, 8/28/23:   "Mrs. Hancock is a 62-year-old lady with a history that dates to late 2022 when she had upper respiratory infection. This prompted medical attention that resulted in a chest x-ray revealing a left upper lobe mass. This was followed by CT of the chest that confirmed a left upper lobe mass with additional satellite nodules of the lobe as well as nodules of the left pleura. She also had a nodule noted at the right base. She was sent for PET CT scan and ultimately had bronchoscopic biopsy. The left upper lobe was confirmed to be consistent with adenocarcinoma of lung primary.    She was sent to me for recommendations in preparation for an attempt at definitive surgery. Unfortunately, during her attempt for surgical resection, she was found to have metastatic disease to the pleural space with malignant involvement of pleural effusion.     She went on to complete 4 cycles of palliative chemotherapy with carboplatin, pemetrexed, and pembrolizumab with mixed CT findings resulting in recommendations for continuation of chemotherapy with pemetrexed and pembrolizumab. She has remained on pemetrexed and pembrolizumab for ongoing palliative therapy.     Patient presents to clinic in f/u for lung cancer prior to continuation of systemic therapy. She continues maintenance pemetrexed plus pembrolizumab. Since her previous appointment she did have dermatology evaluation of a rash who performed punch biopsy. She was initiated on steroid topical by dermatology with improvement. In the interim she also reported recent increase in frontal headaches that resulted in ordering of MRI of the brain. Other than above noted rash and headache she has continued to tolerate palliative " "Pembro plus pemetrexed well. She currently denies any pain. She denies any shortness of breath. She denies any nausea or vomiting. She denies any diarrhea or constipation.    1. Metastatic left lung adenocarcinoma: She has completed 4 cycles of palliative chemotherapy with carboplatin, pemetrexed, and pembrolizumab with mixed CT findings resulting in recommendations for continuation of chemotherapy with pemetrexed and pembrolizumab. She has continued pemetrexed plus pembrolizumab for maintenance palliative therapy with positive radiographic response. CT scan results discussed in detail with patient. Multiple questions were asked by patient during discussion of CT scan results, all of which were answered in detail to her apparent satisfaction. With continued positive radiographic response and without dermatologic evaluation suggest immune mediated skin reaction, recommendations made for patient to continue pemetrexed plus pembrolizumab for ongoing palliative maintenance therapy. Patient verbalized understanding and agrees with recommendations as made.    Once again discussed the need to refrain from the use of systemic steroid therapy with immunotherapy given the theoretical risk that systemic steroid could augment the T-cell response.     2. Rash - s/p punch biopsy of left distal pretibial region on 8/3/2023 pathologic consistent with spongiotic dermatitis with eosinophils; negative for medication reaction per dermatology. She will continue topical management per dermatology recommendations. "     HPI:     JACOBO COLEMAN is a 63 y.o. 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 Pt is also s/p palliative XRT to T6-T9 vertebrae and adacent paravertebral L rib lesions (22.5 Gy/% Fx, EOT 1/18/24) Pt was previously treated by Dr. Hossein Fields ([facility]) and Dr. Cheney.    Last note 3/18/24    Interval History:  - 3/22/24: TB, plan to discuss w/ MDA but favor that this was mixed radiation and drug induced pneumonitis  - 3/22/24: CT C, improving pnuemonitis    Patient states that, since tapering her prednisone from 20 b.i.d. to 20 daily, her shortness of breath has worsened.  She describes episodes of chest tightness and shortness of breath.  During these episodes, she has a cough productive of liquid sputum, sometimes described as frothy .  These episodes can occur either with exertion or at rest.  She notes intermittent desaturations, saying that she was in the mid 70s this morning.  She denies any chest pain.  She confirms that she is taking nebulizers as scheduled and also p.r.n..  She is also taking Mucinex 800 b.i.d..  She says that her appetite has been good.      History has been obtained by chart review and discussion with the patient.    Past Medical History:   Past Medical History:   Diagnosis Date    Malignant neoplasm of upper lobe of left lung 1/19/2023    Thyroid disease         Past Surgical History:   Past Surgical History:   Procedure Laterality Date    CHOLECYSTECTOMY      ENDOBRONCHIAL ULTRASOUND Left 1/5/2023    Procedure: ENDOBRONCHIAL ULTRASOUND (EBUS);  Surgeon: Bam Catalan MD;  Location: [facility];  Service: Pulmonary;  Laterality: Left;    HYSTERECTOMY      NAVIGATIONAL BRONCHOSCOPY Left 1/5/2023    Procedure: BRONCHOSCOPY, NAVIGATIONAL;  Surgeon: Bam Catalan MD;  Location: [facility];  Service: Pulmonary;  Laterality: Left;        Family History:   Family History   Problem Relation Age of Onset    Heart failure Mother     Diabetes Mother     Rheum  arthritis Father     Diabetes Brother         Social History:   Social History     Tobacco Use    Smoking status: Former     Current packs/day: 0.00     Types: Cigarettes     Quit date:      Years since quittin.2     Passive exposure: Never    Smokeless tobacco: Never   Substance Use Topics    Alcohol use: Yes     Alcohol/week: 2.0 standard drinks of alcohol     Types: 2 Glasses of wine per week     Comment: socially        I have reviewed and updated the patient's past medical, surgical, family and social histories.      ROS:   As per HPI.     Allergies:   Review of patient's allergies indicates:   Allergen Reactions    Xylocaine with epinephrine [lidocaine-epinephrine] Anaphylaxis    Lipitor [atorvastatin] Other (See Comments)     Body aches      Methocarbamol-aspirin     Zetia [ezetimibe] Other (See Comments)     Muscle spasms      Augmentin [amoxicillin-pot clavulanate] Rash    Macrodantin [nitrofurantoin macrocrystal] Rash    Omnicef [cefdinir] Rash and Other (See Comments)     GI upset    Pravastatin Rash    Sulfa (sulfonamide antibiotics) Rash        Medications:   Current Outpatient Medications   Medication Sig Dispense Refill    albuterol (VENTOLIN HFA) 90 mcg/actuation inhaler Inhale 2 puffs into the lungs every 6 (six) hours as needed for Wheezing. Rescue 18 g 3    albuterol-ipratropium (DUO-NEB) 2.5 mg-0.5 mg/3 mL nebulizer solution Take 3 mLs by nebulization every 6 (six) hours as needed for Wheezing or Shortness of Breath. Rescue 300 mL 11    azelastine (ASTELIN) 137 mcg (0.1 %) nasal spray 1 spray by Nasal route 2 (two) times daily.      budesonide-glycopyr-formoterol 160-9-4.8 mcg/actuation HFAA Inhale 2 puffs into the lungs 2 (two) times daily. Wash out mouth after use 10.7 g 11    cetirizine (ZYRTEC) 10 MG tablet Take 5 mg by mouth once daily.      cycloSPORINE (RESTASIS) 0.05 % ophthalmic emulsion Place 1 drop into both eyes 2 (two) times a day.      eszopiclone (LUNESTA) 3 mg Tab Take 3  mg by mouth every evening.      FLUoxetine 20 MG capsule Take 20 mg by mouth once daily.      fluticasone propionate (FLONASE) 50 mcg/actuation nasal spray 1 spray by Each Nostril route 2 (two) times daily.      hydrOXYzine HCL (ATARAX) 25 MG tablet Take 25 mg by mouth once daily.      ibuprofen (ADVIL,MOTRIN) 200 MG tablet Take by mouth every 6 (six) hours as needed.      ipratropium (ATROVENT) 21 mcg (0.03 %) nasal spray SMARTSI Spray(s) Both Nares 2-3 Times Daily      levoFLOXacin (LEVAQUIN) 750 MG tablet Take 1 tablet (750 mg total) by mouth once daily. 3 tablet 0    levothyroxine (SYNTHROID) 88 MCG tablet Take 88 mcg by mouth every evening.      multivitamin capsule Take 1 capsule by mouth once daily.      mupirocin (BACTROBAN) 2 % ointment Apply topically 3 (three) times daily.      OLANZapine (ZYPREXA) 5 MG tablet Take 5 mg by mouth every evening.      ondansetron (ZOFRAN-ODT) 4 MG TbDL Take 2 mg by mouth every 6 (six) hours as needed.      osimertinib (TAGRISSO) 80 mg Tab Take 1 tab (80 mg) by mouth once daily. 30 tablet 11    pantoprazole (PROTONIX) 40 MG tablet Take 1 tablet by mouth every morning.      predniSONE (DELTASONE) 20 MG tablet Take 1 tablet (20 mg total) by mouth once daily. 14 tablet 0    promethazine (PHENERGAN) 25 MG tablet Take 25 mg by mouth every 6 (six) hours as needed.      varenicline 0.03 mg/spray sprm 1 spray by Each Nostril route 2 (two) times daily.       No current facility-administered medications for this visit.          Physical Exam:       There were no vitals taken for this visit.               Physical Exam  Constitutional:       Appearance: Normal appearance.   HENT:      Head: Normocephalic and atraumatic.   Eyes:      Extraocular Movements: Extraocular movements intact.      Conjunctiva/sclera: Conjunctivae normal.      Pupils: Pupils are equal, round, and reactive to light.   Cardiovascular:      Rate and Rhythm: Normal rate and regular rhythm.      Heart sounds: No  murmur heard.     No friction rub. No gallop.   Pulmonary:      Effort: Pulmonary effort is normal.      Breath sounds: Rales (Scant bibasilar rales in posterior lung field) present. No wheezing or rhonchi.   Musculoskeletal:         General: Normal range of motion.   Skin:     General: Skin is warm and dry.   Neurological:      Mental Status: She is alert and oriented to person, place, and time.   Psychiatric:         Mood and Affect: Mood normal.         Thought Content: Thought content normal.         Judgment: Judgment normal.           Labs:   No results found for this or any previous visit (from the past 48 hour(s)).     Imaging:    See oncologic history above.     Path:  See oncologic history above.      Assessment and Plan:     JACOBO COLEMAN is a 63 y.o. 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 Pt is also s/p palliative XRT to T6-T9 vertebrae and adacent paravertebral L rib lesions (22.5 Gy/% Fx, EOT 1/18/24) Pt was previously treated by Dr. Hossein Fields ([facility]) and Dr. Cheney.    Stage IV Adenocarcinoma of the JAZMYNE, EGFR L858R Mutated, PD-L1 5%  ECOG PS 1 (due to dyspnea, improving).  Patient presents today for follow-up.  Since last visit, patient was discussed at tumor board and reviewed by radiation oncology, with a determination that while some of the changes on her CT scan may represent radiation pneumonitis, it could not definitively be ruled out there was also component of drug-induced pneumonitis (see radiation oncology note from 03/19/2024).  Due to this,  will not rechallenge with osimertinib despite disease response given recommendations in the package insert (https://www.sciencedirect.com/science/article/pii/N500031479355706H#:~:text=In%20relation%20to%20this%2C%20successful,patients%20with%20osimertinib%2Dinduced%20ILD).  Instead, will utilize erlotinib given that patient did not fail EGFR inhibition but rather had a reaction to the initial therapeutic agent.  Discussed with the patient that, while there is evidence that this maneuver it safe and effective (https://www.jtocrr.org/article/-6689(74)83901-3/fulltext), the risk for recurrent/relapsing pneumonitis/ILD remains and this could be a fatal event.  Patient stated that she understood and would like to proceed with erlotinib.  Favor waiting until respiratory symptoms improve and steroids have been appropriately tapered so as to avoid potential worsening of pneumonitis at this time (see below).  Will bring patient back towards the end of her taper for re-evaluation prior to beginning erlotinib.    PLAN:   -- Erlotinib 150 mg daily, will not start until steroid taper complete / nearly complete  -- Doxycycline 100 mg BID for skin ppx  -- Hydrocortisone 1% cream to face, hands, feet, neck, back, and chest in the evenings for skin ppx (30g tube)  -- Will stop protonix prior to erlotinib start  -- RTC in 3 weeks to discuss the above, pt will sign consent at that time      Pneumonitis  Dyspnea  Likely combination of radiation-induced pneumonitis impossible osimertinib-induced pneumonitis.  Patient initially improving while on high-dose steroids, however symptoms have worsened since starting taper from 20 mg of prednisone b.i.d. to 20 mg daily.  She notes desaturations into the mid 70s and attacks (see HPI).  Favor that the steroid taper was too fast (though there is improvement on the CT C from 3/22/24), so will resume 20 mg b.i.d. and taper as below.  Patient has pulmonology follow up on 04/10/2024 with   Hossein, will send message.  Patient aware to reach out with any new or worsening symptoms with low threshold to return to the emergency department.  She does have an oxygen concentrator available.    -- Increase back to prednisone 20 mg BID x 7 days, then 20 mg qAM and 10 mg qPM x 7 days, then 10 mg BID x 7 days, then 10 mg daily x7 days, then off. Message sent to Dr. Catalan as well.   -- Continue pantoprazole      The above information has been reviewed with the patient and all questions have been answered to their apparent satisfaction.  They understand that they can call the clinic with any questions.    Marin Adams MD  Hematology/Oncology  [facility]      Route Chart for Scheduling    Med Onc Chart Routing      Follow up with physician . RTC on 4/15/24.   Follow up with REBEL    Infusion scheduling note    Injection scheduling note    Labs    Imaging    Pharmacy appointment    Other referrals                    Disclaimer: This note was prepared using voice recognition system and is likely to have sound alike errors and is not proof read.  Please call me with any questions.

## 2024-03-25 NOTE — Clinical Note
Afternoon!  Dr. Catalan, this patient will be establishing care with you on 4/10/24. She is currently being treated for pneumonitis (radiation induced, w/ possible medication induced pnuemonitis). When I met her, she was on a taper from prednisone 20 mg BID x 2 weeks to 20 mg daily x 2 weeks. Since stepping down to 20 mg daily, her respiratory status has worsened. I was thinking about tapering as follows:  Increase back to prednisone 20 mg BID x 7 days, then 20 mg qAM and 10 mg qPM x 7 days, then 10 mg BID x 7 days, then 10 mg daily x7 days, then off.  Does this seem like an appropriate rate to you?   Carroll,  Jignesh

## 2024-04-10 ENCOUNTER — OFFICE VISIT (OUTPATIENT)
Dept: PULMONOLOGY | Facility: CLINIC | Age: 64
End: 2024-04-10
Payer: COMMERCIAL

## 2024-04-10 ENCOUNTER — HOSPITAL ENCOUNTER (OUTPATIENT)
Dept: RADIOLOGY | Facility: HOSPITAL | Age: 64
Discharge: HOME | End: 2024-04-10
Attending: INTERNAL MEDICINE
Payer: COMMERCIAL

## 2024-04-10 VITALS
BODY MASS INDEX: 25.13 KG/M2 | HEIGHT: 70 IN | OXYGEN SATURATION: 93 % | DIASTOLIC BLOOD PRESSURE: 94 MMHG | SYSTOLIC BLOOD PRESSURE: 142 MMHG | HEART RATE: 100 BPM | RESPIRATION RATE: 17 BRPM | WEIGHT: 175.5 LBS

## 2024-04-10 DIAGNOSIS — J18.9 PNEUMONIA OF BOTH LUNGS DUE TO INFECTIOUS ORGANISM, UNSPECIFIED PART OF LUNG: ICD-10-CM

## 2024-04-10 DIAGNOSIS — J41.1 CHRONIC BRONCHITIS, MUCOPURULENT: ICD-10-CM

## 2024-04-10 DIAGNOSIS — J98.4 CHEMOTHERAPY INDUCED PULMONARY TOXICITY: ICD-10-CM

## 2024-04-10 DIAGNOSIS — J41.0 SIMPLE CHRONIC BRONCHITIS: ICD-10-CM

## 2024-04-10 DIAGNOSIS — J70.0 RADIATION PNEUMONITIS: ICD-10-CM

## 2024-04-10 DIAGNOSIS — T45.1X5A CHEMOTHERAPY INDUCED PULMONARY TOXICITY: ICD-10-CM

## 2024-04-10 PROCEDURE — 99999 PR PBB SHADOW E&M-EST. PATIENT-LVL III: CPT | Mod: PBBFAC,,, | Performed by: INTERNAL MEDICINE

## 2024-04-10 PROCEDURE — 3080F DIAST BP >= 90 MM HG: CPT | Mod: CPTII,S$GLB,, | Performed by: INTERNAL MEDICINE

## 2024-04-10 PROCEDURE — 71048 X-RAY EXAM CHEST 4+ VIEWS: CPT | Mod: TC

## 2024-04-10 PROCEDURE — 99214 OFFICE O/P EST MOD 30 MIN: CPT | Mod: S$GLB,,, | Performed by: INTERNAL MEDICINE

## 2024-04-10 PROCEDURE — 1159F MED LIST DOCD IN RCRD: CPT | Mod: CPTII,S$GLB,, | Performed by: INTERNAL MEDICINE

## 2024-04-10 PROCEDURE — 3008F BODY MASS INDEX DOCD: CPT | Mod: CPTII,S$GLB,, | Performed by: INTERNAL MEDICINE

## 2024-04-10 PROCEDURE — 3077F SYST BP >= 140 MM HG: CPT | Mod: CPTII,S$GLB,, | Performed by: INTERNAL MEDICINE

## 2024-04-10 PROCEDURE — 71048 X-RAY EXAM CHEST 4+ VIEWS: CPT | Mod: 26,,, | Performed by: RADIOLOGY

## 2024-04-10 PROCEDURE — 1160F RVW MEDS BY RX/DR IN RCRD: CPT | Mod: CPTII,S$GLB,, | Performed by: INTERNAL MEDICINE

## 2024-04-10 PROCEDURE — 3044F HG A1C LEVEL LT 7.0%: CPT | Mod: CPTII,S$GLB,, | Performed by: INTERNAL MEDICINE

## 2024-04-10 PROCEDURE — 4010F ACE/ARB THERAPY RXD/TAKEN: CPT | Mod: CPTII,S$GLB,, | Performed by: INTERNAL MEDICINE

## 2024-04-10 RX ORDER — IPRATROPIUM BROMIDE AND ALBUTEROL SULFATE 2.5; .5 MG/3ML; MG/3ML
3 SOLUTION RESPIRATORY (INHALATION) EVERY 6 HOURS PRN
Qty: 300 ML | Refills: 11 | Status: SHIPPED | OUTPATIENT
Start: 2024-04-10 | End: 2025-04-10

## 2024-04-10 RX ORDER — PREDNISONE 10 MG/1
10 TABLET ORAL DAILY
Qty: 30 TABLET | Refills: 1 | Status: SHIPPED | OUTPATIENT
Start: 2024-04-10

## 2024-04-10 RX ORDER — ALBUTEROL SULFATE 90 UG/1
2 AEROSOL, METERED RESPIRATORY (INHALATION) EVERY 6 HOURS PRN
Qty: 18 G | Refills: 3 | Status: SHIPPED | OUTPATIENT
Start: 2024-04-10

## 2024-04-10 NOTE — PROGRESS NOTES
Subjective:      Patient ID: JACOBO COLEMAN is a 63 y.o. female.    Chief Complaint: Shortness of Breath    Shortness of Breath        63 y.o. 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 Pt is also s/p palliative XRT to T6-T9 vertebrae and adacent paravertebral L rib lesions (22.5 Gy/% Fx, EOT 1/18/24) Pt was previously treated by Dr. Hossein Fields ([facility]) and Dr. Cheney.      2/24 and treated with Doxy and prednisone taper. She was seen by Dr. Cheney 2/28/24- CTA chest ordered to rule out PE as cause of shortness of breath. CTA was negative for PE- but with bilateral GGO, new from 12/2023. She was admitted to the hospital- treated with Levaquin and breathing treatments. Discharged 3/1 with po levaqin.     3/25/2024:  Patient states that, since tapering her prednisone from 20 b.i.d. to 20 daily, her shortness of breath has worsened.  She describes episodes of chest tightness and shortness of breath.  During these episodes, she has a cough productive of liquid sputum, sometimes described as frothy .  These episodes can occur either with exertion or at rest.  She notes intermittent desaturations, saying that she was in the mid 70s this morning.  She denies any chest pain.  She confirms that she is taking nebulizers as scheduled and also p.r.n..  She is also taking Mucinex 800 b.i.d..  She says that her appetite has been good.     April 2024  Feeling better overall while on her prednisone taper.  Just occasional dry cough no sputum production.  Denies fevers, chills and chest  "pain.  She does have episodes of bronchospasm which he treats with DuoNeb.  Previous visit she had a 6 minute walk test done that showed desaturation to 88% and she does have portable oxygen that she uses occasionally.  Otherwise no new complaints.  She is scheduled to start Erlotinib in the near future per [facility]e-Onc notes.    Current inhaler regimen is Breztri twice a day, duo neb every 6 hours as needed and albuterol via rescue inhaler p.r.n. also taking Mucinex 600 mg b.i.d.    Review of Systems   Respiratory:  Positive for shortness of breath.     as per HPI otherwise negative    Objective:     Physical Exam   Constitutional: She is oriented to person, place, and time. She appears well-developed. No distress.   HENT:   Head: Normocephalic.   Cardiovascular: Normal rate and regular rhythm.   Pulmonary/Chest: Normal expansion, symmetric chest wall expansion, effort normal and breath sounds normal.   Abdominal: Soft.   Musculoskeletal:      Cervical back: Neck supple.   Neurological: She is alert and oriented to person, place, and time.   Psychiatric: She has a normal mood and affect.   Nursing note and vitals reviewed.          4/10/2024    11:02 AM 3/25/2024     1:22 PM 3/18/2024     1:15 PM 3/13/2024     9:11 AM 3/11/2024     1:17 PM 3/11/2024    11:38 AM 3/5/2024     8:24 AM   Pulmonary Function Tests   SpO2 93 % 90 % 96 % 97 % 95 %  91 %   Ordering Provider      Jami Smith PA-C    Performing nurse/tech/RT      VT, RT    Height 5' 10" (1.778 m) 5' 10" (1.778 m) 5' 10" (1.778 m) 5' 10" (1.778 m) 5' 10" (1.778 m) 5' 10" (1.778 m) 5' 10" (1.778 m)   Weight 79.6 kg (175 lb 7.8 oz) 78.2 kg (172 lb 6.4 oz) 77.2 kg (170 lb 3.1 oz) 76.6 kg (168 lb 14 oz) 77 kg (169 lb 12.1 oz) 77 kg (169 lb 12.1 oz) 76.5 kg (168 lb 10.4 oz)   BMI (Calculated) 25.2 24.7 24.4 24.2 24.4 24.4 24.2   Patient Race          6MWT Status      completed without stopping    Patient Reported      Dyspnea    Was O2 used?      No  "   6MW Distance walked (feet)      1375 feet    Distance walked (meters)      419.1 meters    Did patient stop?      No    Type of assistive device(s) used?      no assistive devices    Oxygen Saturation      95 %    Supplemental Oxygen      Room Air    Heart Rate      102 bpm    Blood Pressure      118/66    Gladys Dyspnea Rating       moderate    Oxygen Saturation      88 %    Supplemental Oxygen      Room Air    Heart Rate      125 bpm    Blood Pressure      151/70    Gladys Dyspnea Rating       very heavy    Recovery Time (seconds)      240 seconds    Oxygen Saturation      95 %    Supplemental Oxygen      2 L/M    Heart Rate      102 bpm    Blood Pressure      130/76    Gladys Dyspnea Rating       moderate    Is procedure ready for interpretation?      Yes    Oxygen Qualification?      Yes    Oxygen Saturation      95 %    Supplemental Oxygen      Room Air    Heart Rate      102 bpm    Blood Pressure      118/63    Gladys Dyspnea Rating       moderate    Oxygen Saturation      88 %    Supplemental Oxygen      Room Air    Heart Rate      125 bpm    Blood Pressure      151/70    Gladys Dyspnea Rating       very heavy    Recovery Time (seconds)      240 seconds    Oxygen Saturation      95 %    Supplemental Oxygen      2 L/M    Heart Rate      102 bpm    Blood Pressure      130/76    Gladys Dyspnea Rating       moderate         Assessment:     1. Chronic bronchitis, mucopurulent    2. Simple chronic bronchitis    3. Chemotherapy induced pulmonary toxicity    4. Radiation pneumonitis      FINDINGS:  There are some scarring changes seen projecting over either midlung zone.  No obvious infiltrates or effusions are identified.  The heart is not enlarged.     Impression:     1.  As above    Plan:     Overall stable from a respiratory standpoint  Continue current inhaler regimen  No worrisome findings on chest x-ray today  Continue prednisone taper  I did send a new prescription for prednisone to keep on hand  Refilled inhaled  medications as well today  Continue to follow up with Hematology Oncology  Return in 3 months, sooner if needed

## 2024-04-15 ENCOUNTER — LAB VISIT (OUTPATIENT)
Dept: LAB | Facility: HOSPITAL | Age: 64
End: 2024-04-15
Attending: HOSPITALIST
Payer: COMMERCIAL

## 2024-04-15 ENCOUNTER — PATIENT MESSAGE (OUTPATIENT)
Dept: HEMATOLOGY/ONCOLOGY | Facility: CLINIC | Age: 64
End: 2024-04-15

## 2024-04-15 ENCOUNTER — OFFICE VISIT (OUTPATIENT)
Dept: HEMATOLOGY/ONCOLOGY | Facility: CLINIC | Age: 64
End: 2024-04-15
Payer: COMMERCIAL

## 2024-04-15 VITALS
SYSTOLIC BLOOD PRESSURE: 137 MMHG | HEART RATE: 103 BPM | BODY MASS INDEX: 25.38 KG/M2 | DIASTOLIC BLOOD PRESSURE: 81 MMHG | WEIGHT: 177.25 LBS | OXYGEN SATURATION: 94 % | HEIGHT: 70 IN | TEMPERATURE: 97 F

## 2024-04-15 DIAGNOSIS — C34.12 ADENOCARCINOMA OF UPPER LOBE OF LEFT LUNG: ICD-10-CM

## 2024-04-15 DIAGNOSIS — J98.4 CHEMOTHERAPY INDUCED PULMONARY TOXICITY: ICD-10-CM

## 2024-04-15 DIAGNOSIS — R25.2 LEG CRAMPS: ICD-10-CM

## 2024-04-15 DIAGNOSIS — C78.2 SECONDARY MALIGNANCY OF PARIETAL PLEURA: ICD-10-CM

## 2024-04-15 DIAGNOSIS — C34.12 ADENOCARCINOMA OF UPPER LOBE OF LEFT LUNG: Primary | ICD-10-CM

## 2024-04-15 DIAGNOSIS — T45.1X5A CHEMOTHERAPY INDUCED PULMONARY TOXICITY: ICD-10-CM

## 2024-04-15 DIAGNOSIS — C79.51 SECONDARY MALIGNANT NEOPLASM OF BONE: ICD-10-CM

## 2024-04-15 DIAGNOSIS — J70.0 RADIATION PNEUMONITIS: ICD-10-CM

## 2024-04-15 LAB
ALBUMIN SERPL BCP-MCNC: 3.2 G/DL (ref 3.5–5.2)
ALP SERPL-CCNC: 101 U/L (ref 55–135)
ALT SERPL W/O P-5'-P-CCNC: 44 U/L (ref 10–44)
ANION GAP SERPL CALC-SCNC: 10 MMOL/L (ref 8–16)
AST SERPL-CCNC: 23 U/L (ref 10–40)
BILIRUB SERPL-MCNC: 0.3 MG/DL (ref 0.1–1)
BUN SERPL-MCNC: 23 MG/DL (ref 8–23)
CALCIUM SERPL-MCNC: 9.2 MG/DL (ref 8.7–10.5)
CHLORIDE SERPL-SCNC: 106 MMOL/L (ref 95–110)
CO2 SERPL-SCNC: 22 MMOL/L (ref 23–29)
CREAT SERPL-MCNC: 1.4 MG/DL (ref 0.5–1.4)
ERYTHROCYTE [DISTWIDTH] IN BLOOD BY AUTOMATED COUNT: 16.4 % (ref 11.5–14.5)
EST. GFR  (NO RACE VARIABLE): 42 ML/MIN/1.73 M^2
GLUCOSE SERPL-MCNC: 340 MG/DL (ref 70–110)
HCT VFR BLD AUTO: 36.6 % (ref 37–48.5)
HGB BLD-MCNC: 11.7 G/DL (ref 12–16)
IMM GRANULOCYTES # BLD AUTO: 0.25 K/UL (ref 0–0.04)
MAGNESIUM SERPL-MCNC: 1.8 MG/DL (ref 1.6–2.6)
MCH RBC QN AUTO: 26.9 PG (ref 27–31)
MCHC RBC AUTO-ENTMCNC: 32 G/DL (ref 32–36)
MCV RBC AUTO: 84 FL (ref 82–98)
NEUTROPHILS # BLD AUTO: 8.7 K/UL (ref 1.8–7.7)
PLATELET # BLD AUTO: 275 K/UL (ref 150–450)
PMV BLD AUTO: 8.6 FL (ref 9.2–12.9)
POTASSIUM SERPL-SCNC: 4.7 MMOL/L (ref 3.5–5.1)
PROT SERPL-MCNC: 6.7 G/DL (ref 6–8.4)
RBC # BLD AUTO: 4.35 M/UL (ref 4–5.4)
SODIUM SERPL-SCNC: 138 MMOL/L (ref 136–145)
WBC # BLD AUTO: 10.27 K/UL (ref 3.9–12.7)

## 2024-04-15 PROCEDURE — 36415 COLL VENOUS BLD VENIPUNCTURE: CPT | Performed by: HOSPITALIST

## 2024-04-15 PROCEDURE — 83735 ASSAY OF MAGNESIUM: CPT | Performed by: HOSPITALIST

## 2024-04-15 PROCEDURE — 3079F DIAST BP 80-89 MM HG: CPT | Mod: CPTII,S$GLB,, | Performed by: HOSPITALIST

## 2024-04-15 PROCEDURE — 3008F BODY MASS INDEX DOCD: CPT | Mod: CPTII,S$GLB,, | Performed by: HOSPITALIST

## 2024-04-15 PROCEDURE — 80053 COMPREHEN METABOLIC PANEL: CPT | Performed by: HOSPITALIST

## 2024-04-15 PROCEDURE — 4010F ACE/ARB THERAPY RXD/TAKEN: CPT | Mod: CPTII,S$GLB,, | Performed by: HOSPITALIST

## 2024-04-15 PROCEDURE — 3075F SYST BP GE 130 - 139MM HG: CPT | Mod: CPTII,S$GLB,, | Performed by: HOSPITALIST

## 2024-04-15 PROCEDURE — 99215 OFFICE O/P EST HI 40 MIN: CPT | Mod: S$GLB,,, | Performed by: HOSPITALIST

## 2024-04-15 PROCEDURE — 85027 COMPLETE CBC AUTOMATED: CPT | Performed by: HOSPITALIST

## 2024-04-15 PROCEDURE — 3044F HG A1C LEVEL LT 7.0%: CPT | Mod: CPTII,S$GLB,, | Performed by: HOSPITALIST

## 2024-04-15 PROCEDURE — 1159F MED LIST DOCD IN RCRD: CPT | Mod: CPTII,S$GLB,, | Performed by: HOSPITALIST

## 2024-04-15 PROCEDURE — 99999 PR PBB SHADOW E&M-EST. PATIENT-LVL V: CPT | Mod: PBBFAC,,, | Performed by: HOSPITALIST

## 2024-04-15 RX ORDER — DOXYCYCLINE 100 MG/1
100 CAPSULE ORAL 2 TIMES DAILY
Qty: 60 CAPSULE | Refills: 1 | Status: SHIPPED | OUTPATIENT
Start: 2024-04-15

## 2024-04-15 RX ORDER — ERLOTINIB HYDROCHLORIDE 150 MG/1
150 TABLET, FILM COATED ORAL DAILY
Qty: 30 TABLET | Refills: 11 | Status: ACTIVE | OUTPATIENT
Start: 2024-04-15 | End: 2025-04-15

## 2024-04-15 RX ORDER — HYDROCORTISONE 1 %
CREAM (GRAM) TOPICAL NIGHTLY
Qty: 30 G | Refills: 1 | Status: SHIPPED | OUTPATIENT
Start: 2024-04-15 | End: 2024-05-20

## 2024-04-15 NOTE — PROGRESS NOTES
[facility] MEDICAL ONCOLOGY - FOLLOW UP VISIT    Reason for visit: Stage IV adenocarcinoma of the lung      Oncology History   Adenocarcinoma of upper lobe of left lung   1/5/2023 Procedure    Bronchoscopy  JAZMYNE Biopsy  - Adenocarcinoma, (TTF-1 positive, p40 negative)    [place] NGS  - EGFR L858R mutation positive, VAF 34.8%  - PD-L1 5%     2/1/2023 Procedure    Attempted JAZMYNE Resection, Aborted for Pleural / Diaphragmatic Involvement  1. Diaphragm nodules, biopsy:                                               - Moderately differentiated adenocarcinoma.                           2. Parietal pleura, biopsies:                                               - Moderately differentiated adenocarcinoma.                           3. Pericardial nodule, biopsy:                                              - Moderately differentiated adenocarcinoma, see Comment.                 Comment; Immunohistochemistry was performed on tissue block 3A.         The adenocarcinoma is positive for TTF-1, Austin-EP4, cytokeratin 7        and Napsin-A. Calretinin, cytokeratin 20 and cytokeratin 5/6 are        negative. This immunophenotype supports pulmonary origin.              4. Diaphragm nodule, biopsy:                                                - Atypical TTF-1 positive cells, cannot exclude malignancy.           5. Left pleural fluid for cytology (ThinPrep, two cytospins, and           cell block):                                                             - Positive for malignant cells, consistent with metastatic             adenocarcinoma, pulmonary origin.                                         Comment; Immunohistochemistry was performed on cell block 5A.           The malignant cells are positive for Austin-EP4 and TTF-1.           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 Had not improved on doxycycline and prednisone. Started of levofloxacin.      3/22/2024 Imaging Significant Findings    CT C  - Unchanged 1.7 cm JAZMYNE lesion  - Interval reduction in ground-glass opacities, w/ residual architectural distortion and pleuroparenchymal scarring most compatible w/ sequela of prior viral pneumonitis  - Mild consolidation in the superior segment LLL  -      3/22/2024 Tumor Conference    [facility] Tumor Board  Difficult to determine extent of radiation vs drug-induced pneumonitis. There appear to be ground glass opacities outside of the treatment field, which would argue somewhat against a pure radiation pneumonitis.     Recommendations for Radiation Oncologists to do peer to peer with MD Rojas and for pt to follow up with Dr. Adams to discuss treatment options.        EGFR-related lung cancer   10/5/2023 Initial Diagnosis    EGFR-related lung cancer     10/6/2023 - 10/6/2023 Chemotherapy    Treatment Summary   Plan Name: OP OSIMERTINIB DAILY  Treatment Goal: Control  Status: Inactive  Start Date: 10/6/2023  End Date: 10/6/2023  Provider: Zenon Cheney MD  Chemotherapy: osimertinib (TAGRISSO) 80 mg Tab, 80 mg, Oral, Daily, 1 of 1 cycle, Start date: 10/6/2023, End date: 3/25/2024     3/22/2024 Tumor Conference    [facility] Tumor Board  Difficult to determine extent of radiation vs drug-induced pneumonitis. There appear to be ground glass opacities outside of the treatment field,  "which would argue somewhat against a pure radiation pneumonitis.     Recommendations for Radiation Oncologists to do peer to peer with MD Rojas and for pt to follow up with Dr. Adams to discuss treatment options.           Per Dr. Fields, 8/28/23:   "Mrs. Hancock is a 62-year-old lady with a history that dates to late 2022 when she had upper respiratory infection. This prompted medical attention that resulted in a chest x-ray revealing a left upper lobe mass. This was followed by CT of the chest that confirmed a left upper lobe mass with additional satellite nodules of the lobe as well as nodules of the left pleura. She also had a nodule noted at the right base. She was sent for PET CT scan and ultimately had bronchoscopic biopsy. The left upper lobe was confirmed to be consistent with adenocarcinoma of lung primary.    She was sent to me for recommendations in preparation for an attempt at definitive surgery. Unfortunately, during her attempt for surgical resection, she was found to have metastatic disease to the pleural space with malignant involvement of pleural effusion.     She went on to complete 4 cycles of palliative chemotherapy with carboplatin, pemetrexed, and pembrolizumab with mixed CT findings resulting in recommendations for continuation of chemotherapy with pemetrexed and pembrolizumab. She has remained on pemetrexed and pembrolizumab for ongoing palliative therapy.     Patient presents to clinic in f/u for lung cancer prior to continuation of systemic therapy. She continues maintenance pemetrexed plus pembrolizumab. Since her previous appointment she did have dermatology evaluation of a rash who performed punch biopsy. She was initiated on steroid topical by dermatology with improvement. In the interim she also reported recent increase in frontal headaches that resulted in ordering of MRI of the brain. Other than above noted rash and headache she has continued to tolerate palliative Pembro plus " "pemetrexed well. She currently denies any pain. She denies any shortness of breath. She denies any nausea or vomiting. She denies any diarrhea or constipation.    1. Metastatic left lung adenocarcinoma: She has completed 4 cycles of palliative chemotherapy with carboplatin, pemetrexed, and pembrolizumab with mixed CT findings resulting in recommendations for continuation of chemotherapy with pemetrexed and pembrolizumab. She has continued pemetrexed plus pembrolizumab for maintenance palliative therapy with positive radiographic response. CT scan results discussed in detail with patient. Multiple questions were asked by patient during discussion of CT scan results, all of which were answered in detail to her apparent satisfaction. With continued positive radiographic response and without dermatologic evaluation suggest immune mediated skin reaction, recommendations made for patient to continue pemetrexed plus pembrolizumab for ongoing palliative maintenance therapy. Patient verbalized understanding and agrees with recommendations as made.    Once again discussed the need to refrain from the use of systemic steroid therapy with immunotherapy given the theoretical risk that systemic steroid could augment the T-cell response.     2. Rash - s/p punch biopsy of left distal pretibial region on 8/3/2023 pathologic consistent with spongiotic dermatitis with eosinophils; negative for medication reaction per dermatology. She will continue topical management per dermatology recommendations. "     HPI:     JACOBO COLEMAN is a 63 y.o. 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 Pt is also s/p palliative XRT to T6-T9 vertebrae and adacent paravertebral L rib lesions (22.5 Gy/% Fx, EOT 1/18/24) Pt was previously treated by Dr. Hossein Fields ([facility]) and Dr. Cheney.    Last clinic 3/25/24, plan for erlotinib once completed with steroid taper. Doxycycline and hydrocortisone also prescribed. Discussed stopping protonix. RTC in 3 weeks to sign consent. Increased back to prednisone 20 mg BID x 7 days, then down by 10 mg weekly until off. Message sent to pul.     Interval History:  - 4/10/24: Pulmonology, pt feeling better in general, occasional dry cough. Intermittently using O2. Scheduled to start erlotinib. Overall stable, continue current inhaler regimen, no worrisome findings on CXR, continue prednisone taper. RTC in 3 months.     Pt states that she continues to have "good days and bad days" regarding her breathing. She says that her ease of breathing is variable, based in large part on how well she rests. She continues to use a breathing treatment in the morning and at night. She is currently taking 10 mg of prednisone daily as of today, and was on 10 mg twice a day previously. She feels that she is tolerating the taper well. She notes that she has not been taking extra prednisone. She notes cramps in her feet; she says this is a chronic issue which has worsened since she was diagnosed with cancer. She notes that she walked from 00:00 to 02:00 this morning and generally does not feel well rested. She also describes b/l knee pain which is newer. She has oxycodone at home but is wary to take this. She says that advil has not helped with her pain. She confirms that she stopped her protonix.    Past Medical History:   Past Medical History:   Diagnosis Date    Malignant neoplasm of upper lobe of left lung 1/19/2023    Thyroid disease         Past Surgical History:   Past Surgical History:   Procedure " Laterality Date    CHOLECYSTECTOMY      ENDOBRONCHIAL ULTRASOUND Left 2023    Procedure: ENDOBRONCHIAL ULTRASOUND (EBUS);  Surgeon: Bam Catalan MD;  Location: [facility] ENDO;  Service: Pulmonary;  Laterality: Left;    HYSTERECTOMY      NAVIGATIONAL BRONCHOSCOPY Left 2023    Procedure: BRONCHOSCOPY, NAVIGATIONAL;  Surgeon: Bam Catalan MD;  Location: [facility] ENDO;  Service: Pulmonary;  Laterality: Left;        Family History:   Family History   Problem Relation Name Age of Onset    Heart failure Mother Christi     Diabetes Mother Christi     Rheum arthritis Father Rayray     Diabetes Brother Jamal         Social History:   Social History     Tobacco Use    Smoking status: Former     Current packs/day: 0.00     Types: Cigarettes     Quit date:      Years since quittin.3     Passive exposure: Never    Smokeless tobacco: Never   Substance Use Topics    Alcohol use: Yes     Alcohol/week: 2.0 standard drinks of alcohol     Types: 2 Glasses of wine per week     Comment: socially        I have reviewed and updated the patient's past medical, surgical, family and social histories.      ROS:   As per HPI.     Allergies:   Review of patient's allergies indicates:   Allergen Reactions    Xylocaine with epinephrine [lidocaine-epinephrine] Anaphylaxis    Lipitor [atorvastatin] Other (See Comments)     Body aches      Methocarbamol-aspirin     Zetia [ezetimibe] Other (See Comments)     Muscle spasms      Augmentin [amoxicillin-pot clavulanate] Rash    Macrodantin [nitrofurantoin macrocrystal] Rash    Omnicef [cefdinir] Rash and Other (See Comments)     GI upset    Pravastatin Rash    Sulfa (sulfonamide antibiotics) Rash        Medications:   Current Outpatient Medications   Medication Sig Dispense Refill    albuterol (VENTOLIN HFA) 90 mcg/actuation inhaler Inhale 2 puffs into the lungs every 6 (six) hours as needed for Wheezing. Rescue 18 g 3    albuterol-ipratropium (DUO-NEB) 2.5 mg-0.5 mg/3 mL nebulizer solution  Take 3 mLs by nebulization every 6 (six) hours as needed for Wheezing or Shortness of Breath. Rescue 300 mL 11    azelastine (ASTELIN) 137 mcg (0.1 %) nasal spray 1 spray by Nasal route 2 (two) times daily.      budesonide-glycopyr-formoterol 160-9-4.8 mcg/actuation HFAA Inhale 2 puffs into the lungs 2 (two) times daily. Wash out mouth after use 10.7 g 11    cetirizine (ZYRTEC) 10 MG tablet Take 5 mg by mouth once daily.      cycloSPORINE (RESTASIS) 0.05 % ophthalmic emulsion Place 1 drop into both eyes 2 (two) times a day.      eszopiclone (LUNESTA) 3 mg Tab Take 3 mg by mouth every evening.      FLUoxetine 20 MG capsule Take 20 mg by mouth once daily.      fluticasone propionate (FLONASE) 50 mcg/actuation nasal spray 1 spray by Each Nostril route 2 (two) times daily.      hydrOXYzine HCL (ATARAX) 25 MG tablet Take 25 mg by mouth once daily.      ibuprofen (ADVIL,MOTRIN) 200 MG tablet Take by mouth every 6 (six) hours as needed.      ipratropium (ATROVENT) 21 mcg (0.03 %) nasal spray SMARTSI Spray(s) Both Nares 2-3 Times Daily      levoFLOXacin (LEVAQUIN) 750 MG tablet Take 1 tablet (750 mg total) by mouth once daily. 3 tablet 0    levothyroxine (SYNTHROID) 88 MCG tablet Take 88 mcg by mouth every evening.      multivitamin capsule Take 1 capsule by mouth once daily.      mupirocin (BACTROBAN) 2 % ointment Apply topically 3 (three) times daily.      OLANZapine (ZYPREXA) 5 MG tablet Take 5 mg by mouth every evening.      ondansetron (ZOFRAN-ODT) 4 MG TbDL Take 2 mg by mouth every 6 (six) hours as needed.      pantoprazole (PROTONIX) 40 MG tablet Take 1 tablet by mouth every morning.      predniSONE (DELTASONE) 10 MG tablet Take 2 tabs (20 mg) in the morning and 2 tabs (20 mg) in the evening from 3/25/24-3/31/24; take 2 tabs (20 mg) in the morning and 1 tab (10 mg) in the evening from 24-24; take 1 tab (10 mg) in the morning and 1 tab (10 mg) in the evening from 24-24; take 1 tab (10 mg) in the  morning from 4/15/24-4/21/24). 70 tablet 0    predniSONE (DELTASONE) 10 MG tablet Take 1 tablet (10 mg total) by mouth once daily. 30 tablet 1    promethazine (PHENERGAN) 25 MG tablet Take 25 mg by mouth every 6 (six) hours as needed.      varenicline 0.03 mg/spray sprm 1 spray by Each Nostril route 2 (two) times daily.       No current facility-administered medications for this visit.          Physical Exam:       There were no vitals taken for this visit.               Physical Exam  Constitutional:       Appearance: Normal appearance.   HENT:      Head: Normocephalic and atraumatic.   Eyes:      Extraocular Movements: Extraocular movements intact.      Conjunctiva/sclera: Conjunctivae normal.      Pupils: Pupils are equal, round, and reactive to light.   Cardiovascular:      Rate and Rhythm: Normal rate and regular rhythm.      Heart sounds: No murmur heard.     No friction rub. No gallop.   Pulmonary:      Effort: Pulmonary effort is normal.      Breath sounds: No wheezing, rhonchi or rales.   Musculoskeletal:         General: Normal range of motion.   Skin:     General: Skin is warm and dry.   Neurological:      Mental Status: She is alert and oriented to person, place, and time.   Psychiatric:         Mood and Affect: Mood normal.         Thought Content: Thought content normal.         Judgment: Judgment normal.           Labs:   No results found for this or any previous visit (from the past 48 hour(s)).     Imaging:    See oncologic history above.     Path:  See oncologic history above.      Assessment and Plan:     JACOBO COLEMAN is a 63 y.o. 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 Pt is also s/p palliative XRT to T6-T9 vertebrae and adacent paravertebral L rib lesions (22.5 Gy/% Fx, EOT 1/18/24) Pt was previously treated by Dr. Hossein Fields ([facility]) and Dr. Cheney.    Stage IV Adenocarcinoma of the JAZMYNE, EGFR L858R Mutated, PD-L1 5%  ECOG PS 1 (due to dyspnea, improving).  Patient presents today for follow-up.  She continues on a steroid taper (see below) and appears to be tolerating this well.  Plan remains plan remains to begin erlotinib (see note from 03/25/2024 for rationale and discussion).  We will touch base with the patient early next week at end of taper to confirm respiratory symptoms are stable prior to starting erlotinib.  Erlotinib and supportive medications have been ordered as below.  Plan for monthly labs and in-person evaluations for 3 months followed by repeat imaging; if patient is tolerating well and no evidence of progression at that time, can expand to q3m evaluations.    PLAN:  -- Start erlotinib 150 mg daily after telephone touch-base on 4/22/24 or 4/23/24 to confirm ongoing respiratory stability  -- Doxycycline 100 mg BID for skin ppx  -- Hydrocortisone 1% cream to face, hands, feet, neck, back, and chest in the evenings for skin ppx (30g tube)  -- Baseline labs today  -- Protonix through end of steroid taper, then transition to famtodine. Pt instructed to take the erlotinib either 2 hours before or 10 hours after the H2 blocker  -- RTC with labs in 5 weeks (5/20/24), pt prefers to follow at the [facility]      Pneumonitis  Dyspnea  Likely combination of radiation-induced pneumonitis impossible osimertinib-induced pneumonitis.  Patient initially improving while on high-dose steroids, though temporarily worsened during taper. Taper adjusted to prednisone 20 mg BID x 7 days, then 20 mg qAM and 10 mg qPM x 7 days, then 10 mg BID x 7 days, then 10 mg  daily x7 days, then off. Pt is currently on her final week of prednisone, though may need to add one more at 5 mg weekly depending on response. Patient aware to reach out with any new or worsening symptoms with low threshold to return to the emergency department.  She does have an oxygen concentrator available.    -- Continue prednisone 10 mg daily x 7 days  -- Continue pantoprazole  -- Message sent to Dr. Catalan      Arthralgias  LE Cramps  Pt describes nightly cramps such that she has to awaken and walk for multiple hours (see HPI for description). Also notes significant soreness in her knees. Notably, her current steroid regimen does not seem to be helping.   -- CMP and magnesium today for further evaluation      The above information has been reviewed with the patient and all questions have been answered to their apparent satisfaction.  They understand that they can call the clinic with any questions.    Marin Adams MD  Hematology/Oncology  [facility] [place]      Route Chart for Scheduling    Med Onc Chart Routing      Follow up with physician . RTC on 5/20/24 w/ labs.   Follow up with [facility]    Infusion scheduling note    Injection scheduling note    Labs CBC, CMP and magnesium   Scheduling:  Preferred lab:  Lab interval:     Imaging    Pharmacy appointment    Other referrals                    Disclaimer: This note was prepared using voice recognition system and is likely to have sound alike errors and is not proof read.  Please call me with any questions.

## 2024-04-15 NOTE — Clinical Note
Good afternoon,   Just an FYI that, if she continues to remain relatively stable by next week, she'll start erlotinib for her lung cancer. There is a risk of pneumonitis, though a smaller one than with osimertinib, so I just wanted to keep you in the loop.   Thanks! Jignesh

## 2024-04-17 DIAGNOSIS — K21.9 GASTROESOPHAGEAL REFLUX DISEASE, UNSPECIFIED WHETHER ESOPHAGITIS PRESENT: Primary | ICD-10-CM

## 2024-04-17 RX ORDER — FAMOTIDINE 20 MG/1
20 TABLET, FILM COATED ORAL 2 TIMES DAILY PRN
Qty: 60 TABLET | Refills: 1 | Status: SHIPPED | OUTPATIENT
Start: 2024-04-17 | End: 2025-04-17

## 2024-04-17 NOTE — PROGRESS NOTES
Famotidine prescribed in place of PPI given impending inception of erlotinib. Discussed w/ pt to take erlotinib at least 10 hours after last dose of famotidine and at least 2 hours before next dose.

## 2024-04-22 ENCOUNTER — TELEPHONE (OUTPATIENT)
Dept: HEMATOLOGY/ONCOLOGY | Facility: CLINIC | Age: 64
End: 2024-04-22
Payer: COMMERCIAL

## 2024-04-22 NOTE — TELEPHONE ENCOUNTER
Contacted pt per previous plan, who states that, overall, she feels her NESBITT is slowly improving. She stated that she had gone to every other day steroid dosing, however per the prescription, she should have stopped yesterday after 10 mg daily x7 days. Discussed this with the patient. Will contact her in 2-3 days, if breathing remains stable, can begin erlotinib. Pt confirms receipt of the medication.

## 2024-04-26 ENCOUNTER — TELEPHONE (OUTPATIENT)
Dept: HEMATOLOGY/ONCOLOGY | Facility: CLINIC | Age: 64
End: 2024-04-26
Payer: COMMERCIAL

## 2024-04-26 DIAGNOSIS — M25.50 ARTHRALGIA, UNSPECIFIED JOINT: Primary | ICD-10-CM

## 2024-04-26 RX ORDER — TRAMADOL HYDROCHLORIDE 50 MG/1
50 TABLET ORAL EVERY 6 HOURS PRN
Qty: 45 TABLET | Refills: 0 | Status: SHIPPED | OUTPATIENT
Start: 2024-04-26

## 2024-04-26 NOTE — TELEPHONE ENCOUNTER
Pt states that she continues to have good days and bad days regarding her respiratory status. She feels that this is her new normal, and she says that her breathing is definitely not worsening. She confirms that she has been off of steroids since 5/21/24.     We discussed starting the erlotinib tomorrow. I reiterated the risk for pneumonitis and that she should go to the ED if her breathing worsens in any way. She endorsed understanding.     Pt has f/u scheduled with me on 5/20/24.     Tramadol sent for ongoing arthralgias and LE cramps, not otherwise manageable. Previous workup largely negative.

## 2024-05-01 ENCOUNTER — PATIENT MESSAGE (OUTPATIENT)
Dept: HEMATOLOGY/ONCOLOGY | Facility: CLINIC | Age: 64
End: 2024-05-01
Payer: COMMERCIAL

## 2024-05-08 ENCOUNTER — TELEPHONE (OUTPATIENT)
Dept: HEMATOLOGY/ONCOLOGY | Facility: CLINIC | Age: 64
End: 2024-05-08
Payer: COMMERCIAL

## 2024-05-08 DIAGNOSIS — C34.12 ADENOCARCINOMA OF UPPER LOBE OF LEFT LUNG: ICD-10-CM

## 2024-05-08 DIAGNOSIS — J70.0 RADIATION PNEUMONITIS: Primary | ICD-10-CM

## 2024-05-08 NOTE — TELEPHONE ENCOUNTER
"Contacted the patient regarding her respiratory status. She says that she is "having a really good day today". She is currently on 20 mg of prednisone daily, though says that she may drop this to 10 mg daily. She continues to take breathing treatments.     Per discussion with pulmonology, will obtain CT C within the next week.     CT order placed, message sent for scheduling.     Pt has follow up arranged on 5/20/24.   "

## 2024-05-13 ENCOUNTER — HOSPITAL ENCOUNTER (OUTPATIENT)
Dept: RADIOLOGY | Facility: HOSPITAL | Age: 64
Discharge: HOME | End: 2024-05-13
Attending: HOSPITALIST
Payer: COMMERCIAL

## 2024-05-13 DIAGNOSIS — J70.0 RADIATION PNEUMONITIS: ICD-10-CM

## 2024-05-13 DIAGNOSIS — C34.12 ADENOCARCINOMA OF UPPER LOBE OF LEFT LUNG: ICD-10-CM

## 2024-05-13 PROCEDURE — 71260 CT THORAX DX C+: CPT | Mod: 26,,, | Performed by: RADIOLOGY

## 2024-05-13 PROCEDURE — 71260 CT THORAX DX C+: CPT | Mod: TC

## 2024-05-13 PROCEDURE — 25500020 PHARM REV CODE 255: Performed by: HOSPITALIST

## 2024-05-13 RX ADMIN — IOHEXOL 100 ML: 350 INJECTION, SOLUTION INTRAVENOUS at 02:05

## 2024-05-16 ENCOUNTER — PATIENT MESSAGE (OUTPATIENT)
Dept: HEMATOLOGY/ONCOLOGY | Facility: CLINIC | Age: 64
End: 2024-05-16
Payer: COMMERCIAL

## 2024-05-20 ENCOUNTER — LAB VISIT (OUTPATIENT)
Dept: LAB | Facility: HOSPITAL | Age: 64
End: 2024-05-20
Attending: HOSPITALIST
Payer: COMMERCIAL

## 2024-05-20 ENCOUNTER — PATIENT MESSAGE (OUTPATIENT)
Dept: HEMATOLOGY/ONCOLOGY | Facility: CLINIC | Age: 64
End: 2024-05-20

## 2024-05-20 ENCOUNTER — OFFICE VISIT (OUTPATIENT)
Dept: HEMATOLOGY/ONCOLOGY | Facility: CLINIC | Age: 64
End: 2024-05-20
Payer: COMMERCIAL

## 2024-05-20 VITALS
WEIGHT: 177.94 LBS | TEMPERATURE: 98 F | OXYGEN SATURATION: 90 % | DIASTOLIC BLOOD PRESSURE: 86 MMHG | SYSTOLIC BLOOD PRESSURE: 137 MMHG | BODY MASS INDEX: 25.47 KG/M2 | HEART RATE: 88 BPM | HEIGHT: 70 IN

## 2024-05-20 DIAGNOSIS — J70.0 RADIATION PNEUMONITIS: ICD-10-CM

## 2024-05-20 DIAGNOSIS — C34.12 ADENOCARCINOMA OF UPPER LOBE OF LEFT LUNG: ICD-10-CM

## 2024-05-20 DIAGNOSIS — C34.12 ADENOCARCINOMA OF UPPER LOBE OF LEFT LUNG: Primary | ICD-10-CM

## 2024-05-20 DIAGNOSIS — C79.51 SECONDARY MALIGNANT NEOPLASM OF BONE: ICD-10-CM

## 2024-05-20 DIAGNOSIS — L70.8 ACNEIFORM RASH: ICD-10-CM

## 2024-05-20 DIAGNOSIS — C78.2 SECONDARY MALIGNANCY OF PARIETAL PLEURA: ICD-10-CM

## 2024-05-20 LAB
ALBUMIN SERPL BCP-MCNC: 3.2 G/DL (ref 3.5–5.2)
ALP SERPL-CCNC: 88 U/L (ref 55–135)
ALT SERPL W/O P-5'-P-CCNC: 21 U/L (ref 10–44)
ANION GAP SERPL CALC-SCNC: 8 MMOL/L (ref 8–16)
AST SERPL-CCNC: 22 U/L (ref 10–40)
BILIRUB SERPL-MCNC: 0.4 MG/DL (ref 0.1–1)
BUN SERPL-MCNC: 12 MG/DL (ref 8–23)
CALCIUM SERPL-MCNC: 9.4 MG/DL (ref 8.7–10.5)
CHLORIDE SERPL-SCNC: 111 MMOL/L (ref 95–110)
CO2 SERPL-SCNC: 23 MMOL/L (ref 23–29)
CREAT SERPL-MCNC: 1.1 MG/DL (ref 0.5–1.4)
ERYTHROCYTE [DISTWIDTH] IN BLOOD BY AUTOMATED COUNT: 16.5 % (ref 11.5–14.5)
EST. GFR  (NO RACE VARIABLE): 56 ML/MIN/1.73 M^2
GLUCOSE SERPL-MCNC: 160 MG/DL (ref 70–110)
HCT VFR BLD AUTO: 35.5 % (ref 37–48.5)
HGB BLD-MCNC: 11.2 G/DL (ref 12–16)
IMM GRANULOCYTES # BLD AUTO: 0.06 K/UL (ref 0–0.04)
MAGNESIUM SERPL-MCNC: 1.7 MG/DL (ref 1.6–2.6)
MCH RBC QN AUTO: 27.1 PG (ref 27–31)
MCHC RBC AUTO-ENTMCNC: 31.5 G/DL (ref 32–36)
MCV RBC AUTO: 86 FL (ref 82–98)
NEUTROPHILS # BLD AUTO: 7 K/UL (ref 1.8–7.7)
PLATELET # BLD AUTO: 322 K/UL (ref 150–450)
PMV BLD AUTO: 8.5 FL (ref 9.2–12.9)
POTASSIUM SERPL-SCNC: 4 MMOL/L (ref 3.5–5.1)
PROT SERPL-MCNC: 6.5 G/DL (ref 6–8.4)
RBC # BLD AUTO: 4.14 M/UL (ref 4–5.4)
SODIUM SERPL-SCNC: 142 MMOL/L (ref 136–145)
WBC # BLD AUTO: 9.57 K/UL (ref 3.9–12.7)

## 2024-05-20 PROCEDURE — 85027 COMPLETE CBC AUTOMATED: CPT | Performed by: HOSPITALIST

## 2024-05-20 PROCEDURE — 3008F BODY MASS INDEX DOCD: CPT | Mod: CPTII,S$GLB,, | Performed by: HOSPITALIST

## 2024-05-20 PROCEDURE — 3075F SYST BP GE 130 - 139MM HG: CPT | Mod: CPTII,S$GLB,, | Performed by: HOSPITALIST

## 2024-05-20 PROCEDURE — 99215 OFFICE O/P EST HI 40 MIN: CPT | Mod: S$GLB,,, | Performed by: HOSPITALIST

## 2024-05-20 PROCEDURE — 1159F MED LIST DOCD IN RCRD: CPT | Mod: CPTII,S$GLB,, | Performed by: HOSPITALIST

## 2024-05-20 PROCEDURE — 3079F DIAST BP 80-89 MM HG: CPT | Mod: CPTII,S$GLB,, | Performed by: HOSPITALIST

## 2024-05-20 PROCEDURE — 4010F ACE/ARB THERAPY RXD/TAKEN: CPT | Mod: CPTII,S$GLB,, | Performed by: HOSPITALIST

## 2024-05-20 PROCEDURE — 36415 COLL VENOUS BLD VENIPUNCTURE: CPT | Performed by: HOSPITALIST

## 2024-05-20 PROCEDURE — 83735 ASSAY OF MAGNESIUM: CPT | Performed by: HOSPITALIST

## 2024-05-20 PROCEDURE — G2211 COMPLEX E/M VISIT ADD ON: HCPCS | Mod: S$GLB,,, | Performed by: HOSPITALIST

## 2024-05-20 PROCEDURE — 80053 COMPREHEN METABOLIC PANEL: CPT | Performed by: HOSPITALIST

## 2024-05-20 PROCEDURE — 99999 PR PBB SHADOW E&M-EST. PATIENT-LVL V: CPT | Mod: PBBFAC,,, | Performed by: HOSPITALIST

## 2024-05-20 PROCEDURE — 3044F HG A1C LEVEL LT 7.0%: CPT | Mod: CPTII,S$GLB,, | Performed by: HOSPITALIST

## 2024-05-20 RX ORDER — HYDROCORTISONE 25 MG/G
CREAM TOPICAL 2 TIMES DAILY
Qty: 453.6 G | Refills: 0 | Status: SHIPPED | OUTPATIENT
Start: 2024-05-20

## 2024-05-20 RX ORDER — CLINDAMYCIN PHOSPHATE 10 MG/G
GEL TOPICAL 2 TIMES DAILY
Qty: 60 G | Refills: 0 | Status: SHIPPED | OUTPATIENT
Start: 2024-05-20

## 2024-05-20 NOTE — PROGRESS NOTES
[facility] MEDICAL ONCOLOGY - FOLLOW UP VISIT    Reason for visit: Stage IV adenocarcinoma of the lung      Oncology History   Adenocarcinoma of upper lobe of left lung   1/5/2023 Procedure    Bronchoscopy  JAZMYNE Biopsy  - Adenocarcinoma, (TTF-1 positive, p40 negative)    [place] NGS  - EGFR L858R mutation positive, VAF 34.8%  - PD-L1 5%     2/1/2023 Procedure    Attempted JAZMYNE Resection, Aborted for Pleural / Diaphragmatic Involvement  1. Diaphragm nodules, biopsy:                                               - Moderately differentiated adenocarcinoma.                           2. Parietal pleura, biopsies:                                               - Moderately differentiated adenocarcinoma.                           3. Pericardial nodule, biopsy:                                              - Moderately differentiated adenocarcinoma, see Comment.                 Comment; Immunohistochemistry was performed on tissue block 3A.         The adenocarcinoma is positive for TTF-1, Austin-EP4, cytokeratin 7        and Napsin-A. Calretinin, cytokeratin 20 and cytokeratin 5/6 are        negative. This immunophenotype supports pulmonary origin.              4. Diaphragm nodule, biopsy:                                                - Atypical TTF-1 positive cells, cannot exclude malignancy.           5. Left pleural fluid for cytology (ThinPrep, two cytospins, and           cell block):                                                             - Positive for malignant cells, consistent with metastatic             adenocarcinoma, pulmonary origin.                                         Comment; Immunohistochemistry was performed on cell block 5A.           The malignant cells are positive for Austin-EP4 and TTF-1.           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 Had not improved on doxycycline and prednisone. Started of levofloxacin.      3/22/2024 Imaging Significant Findings    CT C  - Unchanged 1.7 cm JAZMYNE lesion  - Interval reduction in ground-glass opacities, w/ residual architectural distortion and pleuroparenchymal scarring most compatible w/ sequela of prior viral pneumonitis  - Mild consolidation in the superior segment LLL  -      3/22/2024 Tumor Conference    [facility] Tumor Board  Difficult to determine extent of radiation vs drug-induced pneumonitis. There appear to be ground glass opacities outside of the treatment field, which would argue somewhat against a pure radiation pneumonitis.     Recommendations for Radiation Oncologists to do peer to peer with MD Rojas and for pt to follow up with Dr. Adams to discuss treatment options.        5/13/2024 Imaging Significant Findings    CT C  - Stable appearance of JAZMYNE spiculated mass w/ associated scarring anteriorly  - Additional increasing areas of fibrosis and pulmonary scarring including in the infrahilar region b/l extending posteriorly into lower lobes b/l, suggest changes secondary to localized radiation therapy  - No pathologic LN enlargement     EGFR-related lung cancer   10/5/2023 Initial Diagnosis    EGFR-related lung cancer     10/6/2023 - 10/6/2023 Chemotherapy    Treatment Summary   Plan Name: OP OSIMERTINIB DAILY  Treatment Goal: Control  Status: Inactive  Start Date: 10/6/2023  End  "Date: 10/6/2023  Provider: Zenon Cheney MD  Chemotherapy: osimertinib (TAGRISSO) 80 mg Tab, 80 mg, Oral, Daily, 1 of 1 cycle, Start date: 10/6/2023, End date: 3/25/2024     3/22/2024 Tumor Conference    [facility] Tumor Board  Difficult to determine extent of radiation vs drug-induced pneumonitis. There appear to be ground glass opacities outside of the treatment field, which would argue somewhat against a pure radiation pneumonitis.     Recommendations for Radiation Oncologists to do peer to peer with MD Rojas and for pt to follow up with Dr. Adams to discuss treatment options.           Per Dr. Fields, 8/28/23:   "Mrs. Hancock is a 62-year-old lady with a history that dates to late 2022 when she had upper respiratory infection. This prompted medical attention that resulted in a chest x-ray revealing a left upper lobe mass. This was followed by CT of the chest that confirmed a left upper lobe mass with additional satellite nodules of the lobe as well as nodules of the left pleura. She also had a nodule noted at the right base. She was sent for PET CT scan and ultimately had bronchoscopic biopsy. The left upper lobe was confirmed to be consistent with adenocarcinoma of lung primary.    She was sent to me for recommendations in preparation for an attempt at definitive surgery. Unfortunately, during her attempt for surgical resection, she was found to have metastatic disease to the pleural space with malignant involvement of pleural effusion.     She went on to complete 4 cycles of palliative chemotherapy with carboplatin, pemetrexed, and pembrolizumab with mixed CT findings resulting in recommendations for continuation of chemotherapy with pemetrexed and pembrolizumab. She has remained on pemetrexed and pembrolizumab for ongoing palliative therapy.     Patient presents to clinic in f/u for lung cancer prior to continuation of systemic therapy. She continues maintenance pemetrexed plus pembrolizumab. Since her " "previous appointment she did have dermatology evaluation of a rash who performed punch biopsy. She was initiated on steroid topical by dermatology with improvement. In the interim she also reported recent increase in frontal headaches that resulted in ordering of MRI of the brain. Other than above noted rash and headache she has continued to tolerate palliative Pembro plus pemetrexed well. She currently denies any pain. She denies any shortness of breath. She denies any nausea or vomiting. She denies any diarrhea or constipation.    1. Metastatic left lung adenocarcinoma: She has completed 4 cycles of palliative chemotherapy with carboplatin, pemetrexed, and pembrolizumab with mixed CT findings resulting in recommendations for continuation of chemotherapy with pemetrexed and pembrolizumab. She has continued pemetrexed plus pembrolizumab for maintenance palliative therapy with positive radiographic response. CT scan results discussed in detail with patient. Multiple questions were asked by patient during discussion of CT scan results, all of which were answered in detail to her apparent satisfaction. With continued positive radiographic response and without dermatologic evaluation suggest immune mediated skin reaction, recommendations made for patient to continue pemetrexed plus pembrolizumab for ongoing palliative maintenance therapy. Patient verbalized understanding and agrees with recommendations as made.    Once again discussed the need to refrain from the use of systemic steroid therapy with immunotherapy given the theoretical risk that systemic steroid could augment the T-cell response.     2. Rash - s/p punch biopsy of left distal pretibial region on 8/3/2023 pathologic consistent with spongiotic dermatitis with eosinophils; negative for medication reaction per dermatology. She will continue topical management per dermatology recommendations. "     HPI:     JACOBO COLEMAN is a 63 y.o. 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 Pt is also s/p palliative XRT to T6-T9 vertebrae and adacent paravertebral L rib lesions (22.5 Gy/% Fx, EOT 1/18/24) Pt was previously treated by Dr. Hossein Fields ([facility]) and Dr. Cheney.    Last clinic 4/15/24, plan to start erlotinib around 422 pending ongoing respiratory stability, doxycycline hydrocortisone prescribed, Protonix  for the the remainder of steroid taper and then transition to famotidine.     Interval History:  - 4/17/24:  Famotidine prescribed, 10 hours after last dose in 2 hours before next dose of erlotinib  - 4/22/24:  NESBITT slowly improving, plan to start erlotinib and next few days  - 4/26/24:  Start rule out med tomorrow.  Tramadol for ongoing arthralgias and lower extremity cramps.  - 5/8/24:  Patient back on steroids for NESBITT.  Discussed with pulmonology, plan for CT C following week.  RTC on 5/20/24.  - 5/13/24: CT C, as above    Pt states that she has developed an erythematous rash over her face, neck, and scalp over the past few days. She states that she has not been taking the doxycycline or using the hydrocortisone cream. She says that she continues to feel tired, and says this is unchanged since our last visit (so not worse since starting erlotinib). She confirms that she has been off of her steroids for 1 week now. She says that her breathing "varies" and that she still has "periods of shortness of breath". She confirms that she has been doing yard work but has been trying " "to avoid bending over. She is continuing to see 5 patients twice a week. She feels that her breathing has been stable since stopping the steroids, noting that she is infrequently using her duoneb nebulizer. She says that her sats have been staying >90% (previously dropping down into the 70's). She notes diarrhea after eating spinach and mushrooms, but otherwise denies diarrhea since starting the erlotinib. She says that her GERD is worse in the evening, since starting the famotidine; she says that she has only been taking the famotidine "not very often" (only three times) but has not been spacing it 10 hours before and two hours after the erlotinib. She says that her nails are "falling apart". She notes that she has had hangnails, but says this has stabilized since stopping the steroids. She says that her nails are very brittle. She notes daily epistaxis. This is once in the mornings, and she says that it is mild. This has been ongoing since early in 2023. She describes "newer" eye dryness; she has been using her restastis more often.     Past Medical History:   Past Medical History:   Diagnosis Date    Malignant neoplasm of upper lobe of left lung 1/19/2023    Thyroid disease         Past Surgical History:   Past Surgical History:   Procedure Laterality Date    CHOLECYSTECTOMY      ENDOBRONCHIAL ULTRASOUND Left 1/5/2023    Procedure: ENDOBRONCHIAL ULTRASOUND (EBUS);  Surgeon: Bam Catalan MD;  Location: [facility];  Service: Pulmonary;  Laterality: Left;    HYSTERECTOMY      NAVIGATIONAL BRONCHOSCOPY Left 1/5/2023    Procedure: BRONCHOSCOPY, NAVIGATIONAL;  Surgeon: Bam Catalan MD;  Location: [facility];  Service: Pulmonary;  Laterality: Left;        Family History:   Family History   Problem Relation Name Age of Onset    Heart failure Mother Christi     Diabetes Mother Christi     Rheum arthritis Father Rayray     Diabetes Brother Jamal         Social History:   Social History     Tobacco Use    Smoking " status: Former     Current packs/day: 0.00     Types: Cigarettes     Quit date:      Years since quittin.4     Passive exposure: Never    Smokeless tobacco: Never   Substance Use Topics    Alcohol use: Yes     Alcohol/week: 2.0 standard drinks of alcohol     Types: 2 Glasses of wine per week     Comment: socially        I have reviewed and updated the patient's past medical, surgical, family and social histories.      ROS:   As per HPI.     Allergies:   Review of patient's allergies indicates:   Allergen Reactions    Xylocaine with epinephrine [lidocaine-epinephrine] Anaphylaxis    Lipitor [atorvastatin] Other (See Comments)     Body aches      Methocarbamol-aspirin     Zetia [ezetimibe] Other (See Comments)     Muscle spasms      Augmentin [amoxicillin-pot clavulanate] Rash    Macrodantin [nitrofurantoin macrocrystal] Rash    Omnicef [cefdinir] Rash and Other (See Comments)     GI upset    Pravastatin Rash    Sulfa (sulfonamide antibiotics) Rash        Medications:   Current Outpatient Medications   Medication Sig Dispense Refill    albuterol (VENTOLIN HFA) 90 mcg/actuation inhaler Inhale 2 puffs into the lungs every 6 (six) hours as needed for Wheezing. Rescue 18 g 3    albuterol-ipratropium (DUO-NEB) 2.5 mg-0.5 mg/3 mL nebulizer solution Take 3 mLs by nebulization every 6 (six) hours as needed for Wheezing or Shortness of Breath. Rescue 300 mL 11    azelastine (ASTELIN) 137 mcg (0.1 %) nasal spray 1 spray by Nasal route 2 (two) times daily.      budesonide-glycopyr-formoterol 160-9-4.8 mcg/actuation HFAA Inhale 2 puffs into the lungs 2 (two) times daily. Wash out mouth after use 10.7 g 11    cetirizine (ZYRTEC) 10 MG tablet Take 5 mg by mouth once daily.      cycloSPORINE (RESTASIS) 0.05 % ophthalmic emulsion Place 1 drop into both eyes 2 (two) times a day.      doxycycline (VIBRAMYCIN) 100 MG Cap Take 1 capsule (100 mg total) by mouth 2 (two) times daily. 60 capsule 1    erlotinib (TARCEVA) 150 MG  "tablet Take 1 tablet (150 mg total) by mouth once daily. 30 tablet 11    eszopiclone (LUNESTA) 3 mg Tab Take 3 mg by mouth every evening.      famotidine (PEPCID) 20 MG tablet Take 1 tablet (20 mg total) by mouth 2 (two) times daily as needed for Heartburn. 60 tablet 1    FLUoxetine 20 MG capsule Take 20 mg by mouth once daily.      fluticasone propionate (FLONASE) 50 mcg/actuation nasal spray 1 spray by Each Nostril route 2 (two) times daily.      hydrocortisone 1 % cream Apply topically every evening. Apply to face, hands, feet, neck, back, and chest in the evenings 30 g 1    ibuprofen (ADVIL,MOTRIN) 200 MG tablet Take by mouth every 6 (six) hours as needed.      ipratropium (ATROVENT) 21 mcg (0.03 %) nasal spray SMARTSI Spray(s) Both Nares 2-3 Times Daily      levothyroxine (SYNTHROID) 88 MCG tablet Take 88 mcg by mouth every evening.      multivitamin capsule Take 1 capsule by mouth once daily.      mupirocin (BACTROBAN) 2 % ointment Apply topically 3 (three) times daily.      ondansetron (ZOFRAN-ODT) 4 MG TbDL Take 2 mg by mouth every 6 (six) hours as needed.      predniSONE (DELTASONE) 10 MG tablet Take 2 tabs (20 mg) in the morning and 2 tabs (20 mg) in the evening from 3/25/24-3/31/24; take 2 tabs (20 mg) in the morning and 1 tab (10 mg) in the evening from 24-24; take 1 tab (10 mg) in the morning and 1 tab (10 mg) in the evening from 24-24; take 1 tab (10 mg) in the morning from 4/15/24-24). 70 tablet 0    predniSONE (DELTASONE) 10 MG tablet Take 1 tablet (10 mg total) by mouth once daily. 30 tablet 1    promethazine (PHENERGAN) 25 MG tablet Take 25 mg by mouth every 6 (six) hours as needed.      traMADoL (ULTRAM) 50 mg tablet Take 1 tablet (50 mg total) by mouth every 6 (six) hours as needed for Pain. 45 tablet 0     No current facility-administered medications for this visit.          Physical Exam:       /86   Pulse 88   Temp 97.7 °F (36.5 °C) (Temporal)   Ht 5' 10" " (1.778 m)   Wt 80.7 kg (177 lb 14.6 oz)   SpO2 (!) 90%   BMI 25.53 kg/m²                Physical Exam  Constitutional:       Appearance: Normal appearance.   HENT:      Head: Normocephalic and atraumatic.   Eyes:      Extraocular Movements: Extraocular movements intact.      Conjunctiva/sclera: Conjunctivae normal.      Pupils: Pupils are equal, round, and reactive to light.   Cardiovascular:      Rate and Rhythm: Normal rate and regular rhythm.      Heart sounds: No murmur heard.     No friction rub. No gallop.   Pulmonary:      Effort: Pulmonary effort is normal.      Breath sounds: No wheezing, rhonchi or rales.   Musculoskeletal:         General: Normal range of motion.   Skin:     General: Skin is warm and dry.      Comments: Acneiform rash involving face, chest, scalp; pictures in media tab   Neurological:      Mental Status: She is alert and oriented to person, place, and time.   Psychiatric:         Mood and Affect: Mood normal.         Thought Content: Thought content normal.         Judgment: Judgment normal.           Labs:   Recent Results (from the past 48 hour(s))   CBC Oncology    Collection Time: 05/20/24 12:02 PM   Result Value Ref Range    WBC 9.57 3.90 - 12.70 K/uL    RBC 4.14 4.00 - 5.40 M/uL    Hemoglobin 11.2 (L) 12.0 - 16.0 g/dL    Hematocrit 35.5 (L) 37.0 - 48.5 %    MCV 86 82 - 98 fL    MCH 27.1 27.0 - 31.0 pg    MCHC 31.5 (L) 32.0 - 36.0 g/dL    RDW 16.5 (H) 11.5 - 14.5 %    Platelets 322 150 - 450 K/uL    MPV 8.5 (L) 9.2 - 12.9 fL    Gran # (ANC) 7.0 1.8 - 7.7 K/uL    Immature Grans (Abs) 0.06 (H) 0.00 - 0.04 K/uL   Comprehensive Metabolic Panel    Collection Time: 05/20/24 12:02 PM   Result Value Ref Range    Sodium 142 136 - 145 mmol/L    Potassium 4.0 3.5 - 5.1 mmol/L    Chloride 111 (H) 95 - 110 mmol/L    CO2 23 23 - 29 mmol/L    Glucose 160 (H) 70 - 110 mg/dL    BUN 12 8 - 23 mg/dL    Creatinine 1.1 0.5 - 1.4 mg/dL    Calcium 9.4 8.7 - 10.5 mg/dL    Total Protein 6.5 6.0 - 8.4 g/dL     Albumin 3.2 (L) 3.5 - 5.2 g/dL    Total Bilirubin 0.4 0.1 - 1.0 mg/dL    Alkaline Phosphatase 88 55 - 135 U/L    AST 22 10 - 40 U/L    ALT 21 10 - 44 U/L    eGFR 56 (A) >60 mL/min/1.73 m^2    Anion Gap 8 8 - 16 mmol/L   Magnesium    Collection Time: 05/20/24 12:02 PM   Result Value Ref Range    Magnesium 1.7 1.6 - 2.6 mg/dL        Imaging:    See oncologic history above.     Path:  See oncologic history above.      Assessment and Plan:     JACOBO COLEMAN is a 63 y.o. 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 Pt is also s/p palliative XRT to T6-T9 vertebrae and adacent paravertebral L rib lesions (22.5 Gy/% Fx, EOT 1/18/24) Pt was previously treated by Dr. Hossein Fields ([facility]) and Dr. Cheney.    Stage IV Adenocarcinoma of the JAZMYNE, EGFR L858R Mutated, PD-L1 5%  ECOG PS 1 (due to dyspnea, improving).  Patient is currently on erlotinib, tolerating except for acneiform rash (see below).  Most recent imaging (CT C, 5/13/24, after 2.5 month treatment break) demonstrates stable appearance of the JAZMYNE spiculated mass as well as increasing areas of likely radiation / drug induced fibrosis. Plan for monthly labs and in-person evaluations for 3 months followed by repeat imaging; if patient is tolerating well and no evidence of progression at that time, can expand to q3m evaluations.    PLAN:   -- Continue erlotinib 150 mg daily  -- Reiterated to take the erlotinib 10 hours after and 2 hours before famotidine  -- Treatment for acneiform  rash as below  -- Ophthalmology referral for increasingly dry eyes on an EGFR inhibitor  -- Labs and RTC in 1 month, pt prefers to follow at the [facility]      G1 EGFR Inhibitor-Induced Acneiform Rash  Patient has developed an acneiform rash over her face, chest, and scalp over the past 2-3 days.  It is mildly pruritic and tender. It covers ~10% of her body surface area.  Notably, she has not been using doxycycline or hydrocortisone prophylaxis as previously discussed.  Also notably, she was on steroids until 1 week ago which may have alleviated the initial symptoms.  Discussed utilizing hydrocortisone cream to affected areas, will also utilize clindamycin 1% gel; patient states she does not want take doxycycline as this often causes her to have an upset stomach.  -- Start hydrocortisone 2.5% cream   -- Start clindamycin 1% gel  -- Discussed using sun protection (SPF, wide brimmed hat, sun shirts); of note, pt going to [place] next week      Pneumonitis/Radiation Fibrosis  Dyspnea  See previous notes for description.  Most recent imaging demonstrates evolving likely radiation fibrosis.  Symptomatically, patient has improved in his no longer taking steroids.  She says that she has her good days and bad days, but notes that her sats are consistently above 90% much as a new development.  She still has steroids at home to take PRN. per pulmonology.  -- CTM      Arthralgias  LE Cramps  Pt describes nightly cramps such that she has to awaken and walk for multiple hours (see HPI for description). Also notes significant soreness in her knees. Notably, her current steroid regimen does not seem to be helping.   -- CMP and magnesium today for further evaluation      The above information has been reviewed with the patient and all questions have been answered to their apparent satisfaction.  They understand that they can call the clinic with any questions.    Marin Adams MD  Hematology/Oncology  [facility]      Route Chart for Scheduling    Med Onc Chart Routing      Follow up with physician . Labs and RTC in 1 month. Ophthalmology referral placed, please ensure it is ophthalmology and not optometry.   Follow up with [facility]    Infusion scheduling note    Injection scheduling note    Labs    Imaging    Pharmacy appointment    Other referrals         Ophthalmology referral placed, please ensure it is ophthalmology and not optometry.               Disclaimer: This note was prepared using voice recognition system and is likely to have sound alike errors and is not proof read.  Please call me with any questions.

## 2024-06-11 ENCOUNTER — PATIENT MESSAGE (OUTPATIENT)
Dept: HEMATOLOGY/ONCOLOGY | Facility: CLINIC | Age: 64
End: 2024-06-11
Payer: COMMERCIAL

## 2024-06-12 ENCOUNTER — PATIENT MESSAGE (OUTPATIENT)
Dept: ADMINISTRATIVE | Facility: OTHER | Age: 64
End: 2024-06-12
Payer: COMMERCIAL

## 2024-06-12 ENCOUNTER — PATIENT MESSAGE (OUTPATIENT)
Dept: HEMATOLOGY/ONCOLOGY | Facility: CLINIC | Age: 64
End: 2024-06-12
Payer: COMMERCIAL

## 2024-06-12 DIAGNOSIS — L03.012 PARONYCHIA OF FINGER OF LEFT HAND: Primary | ICD-10-CM

## 2024-06-24 ENCOUNTER — OFFICE VISIT (OUTPATIENT)
Dept: HEMATOLOGY/ONCOLOGY | Facility: CLINIC | Age: 64
End: 2024-06-24
Payer: COMMERCIAL

## 2024-06-24 VITALS
DIASTOLIC BLOOD PRESSURE: 81 MMHG | WEIGHT: 177 LBS | OXYGEN SATURATION: 95 % | SYSTOLIC BLOOD PRESSURE: 129 MMHG | TEMPERATURE: 97 F | HEART RATE: 87 BPM | RESPIRATION RATE: 20 BRPM | BODY MASS INDEX: 25.34 KG/M2 | HEIGHT: 70 IN

## 2024-06-24 DIAGNOSIS — C79.51 SECONDARY MALIGNANT NEOPLASM OF BONE: ICD-10-CM

## 2024-06-24 DIAGNOSIS — C34.12 ADENOCARCINOMA OF UPPER LOBE OF LEFT LUNG: Primary | ICD-10-CM

## 2024-06-24 DIAGNOSIS — L70.8 ACNEIFORM RASH: ICD-10-CM

## 2024-06-24 DIAGNOSIS — R25.2 LEG CRAMPS: ICD-10-CM

## 2024-06-24 DIAGNOSIS — M54.10 BACK PAIN WITH RIGHT-SIDED RADICULOPATHY: ICD-10-CM

## 2024-06-24 DIAGNOSIS — R19.7 DIARRHEA, UNSPECIFIED TYPE: ICD-10-CM

## 2024-06-24 DIAGNOSIS — L03.012 PARONYCHIA OF FINGER OF LEFT HAND: ICD-10-CM

## 2024-06-24 DIAGNOSIS — J70.0 RADIATION PNEUMONITIS: ICD-10-CM

## 2024-06-24 PROCEDURE — 3008F BODY MASS INDEX DOCD: CPT | Mod: CPTII,S$GLB,, | Performed by: HOSPITALIST

## 2024-06-24 PROCEDURE — 3079F DIAST BP 80-89 MM HG: CPT | Mod: CPTII,S$GLB,, | Performed by: HOSPITALIST

## 2024-06-24 PROCEDURE — 3074F SYST BP LT 130 MM HG: CPT | Mod: CPTII,S$GLB,, | Performed by: HOSPITALIST

## 2024-06-24 PROCEDURE — 99215 OFFICE O/P EST HI 40 MIN: CPT | Mod: S$GLB,,, | Performed by: HOSPITALIST

## 2024-06-24 PROCEDURE — 1159F MED LIST DOCD IN RCRD: CPT | Mod: CPTII,S$GLB,, | Performed by: HOSPITALIST

## 2024-06-24 PROCEDURE — G2211 COMPLEX E/M VISIT ADD ON: HCPCS | Mod: S$GLB,,, | Performed by: HOSPITALIST

## 2024-06-24 PROCEDURE — 99999 PR PBB SHADOW E&M-EST. PATIENT-LVL V: CPT | Mod: PBBFAC,,, | Performed by: HOSPITALIST

## 2024-06-24 PROCEDURE — 3044F HG A1C LEVEL LT 7.0%: CPT | Mod: CPTII,S$GLB,, | Performed by: HOSPITALIST

## 2024-06-24 PROCEDURE — 4010F ACE/ARB THERAPY RXD/TAKEN: CPT | Mod: CPTII,S$GLB,, | Performed by: HOSPITALIST

## 2024-06-24 RX ORDER — LEVOTHYROXINE SODIUM 88 UG/1
88 TABLET ORAL
COMMUNITY
Start: 2024-06-10

## 2024-06-24 NOTE — PROGRESS NOTES
[facility] MEDICAL ONCOLOGY - FOLLOW UP VISIT    Reason for visit: Stage IV adenocarcinoma of the lung      Oncology History   Adenocarcinoma of upper lobe of left lung   1/5/2023 Procedure    Bronchoscopy  JAZMYNE Biopsy  - Adenocarcinoma, (TTF-1 positive, p40 negative)    [place] NGS  - EGFR L858R mutation positive, VAF 34.8%  - PD-L1 5%     2/1/2023 Procedure    Attempted JAZMYNE Resection, Aborted for Pleural / Diaphragmatic Involvement  1. Diaphragm nodules, biopsy:                                               - Moderately differentiated adenocarcinoma.                           2. Parietal pleura, biopsies:                                               - Moderately differentiated adenocarcinoma.                           3. Pericardial nodule, biopsy:                                              - Moderately differentiated adenocarcinoma, see Comment.                 Comment; Immunohistochemistry was performed on tissue block 3A.         The adenocarcinoma is positive for TTF-1, Austin-EP4, cytokeratin 7        and Napsin-A. Calretinin, cytokeratin 20 and cytokeratin 5/6 are        negative. This immunophenotype supports pulmonary origin.              4. Diaphragm nodule, biopsy:                                                - Atypical TTF-1 positive cells, cannot exclude malignancy.           5. Left pleural fluid for cytology (ThinPrep, two cytospins, and           cell block):                                                             - Positive for malignant cells, consistent with metastatic             adenocarcinoma, pulmonary origin.                                         Comment; Immunohistochemistry was performed on cell block 5A.           The malignant cells are positive for Austin-EP4 and TTF-1.           [facility]  - EGFR L858R (VAF 46%), CTNNB1, CKS1B     9/8/2023 Cancer Staged    Staging form: Lung, AJCC 8th Edition  - Clinical stage from 9/8/2023:  Stage IV (ycT2, cN1, cM1)     2/28/2024 Imaging Significant Findings    CT C, Non-Con  - Interval development of extensive multifocal groundglass infiltrates in b/l upper lobes, lower lobes and right middle lobe   - 2.6 x 1.4 cm spiculated nodule in the left upper lobe with surrounding scarring, previously 3.0 x 1.7 cm   - 2 nodular infiltrates in the right lung base  - Stable sclerotic bone lesions in the thoracic spine consistent with osteoblastic metastasis      2/28/2024 - 3/1/2024 Hospital Admission    Admitted for SOB. Had not improved on doxycycline and prednisone. Started of levofloxacin.      3/22/2024 Imaging Significant Findings    CT C  - Unchanged 1.7 cm JAZMYNE lesion  - Interval reduction in ground-glass opacities, w/ residual architectural distortion and pleuroparenchymal scarring most compatible w/ sequela of prior viral pneumonitis  - Mild consolidation in the superior segment LLL  -      3/22/2024 Tumor Conference    [facility] Tumor Board  Difficult to determine extent of radiation vs drug-induced pneumonitis. There appear to be ground glass opacities outside of the treatment field, which would argue somewhat against a pure radiation pneumonitis.     Recommendations for Radiation Oncologists to do peer to peer with MD Rojas and for pt to follow up with Dr. Adams to discuss treatment options.        5/13/2024 Imaging Significant Findings    CT C  - Stable appearance of JAZMYNE spiculated mass w/ associated scarring anteriorly  - Additional increasing areas of fibrosis and pulmonary scarring including in the infrahilar region b/l extending posteriorly into lower lobes b/l, suggest changes secondary to localized radiation therapy  - No pathologic LN enlargement     EGFR-related lung cancer   10/5/2023 Initial Diagnosis    EGFR-related lung cancer     10/6/2023 - 10/6/2023 Chemotherapy    Treatment Summary   Plan Name: OP OSIMERTINIB DAILY  Treatment Goal: Control  Status: Inactive  Start Date: 10/6/2023  End  "Date: 10/6/2023  Provider: Zenon Cheney MD  Chemotherapy: osimertinib (TAGRISSO) 80 mg Tab, 80 mg, Oral, Daily, 1 of 1 cycle, Start date: 10/6/2023, End date: 3/25/2024     3/22/2024 Tumor Conference    [facility] Tumor Board  Difficult to determine extent of radiation vs drug-induced pneumonitis. There appear to be ground glass opacities outside of the treatment field, which would argue somewhat against a pure radiation pneumonitis.     Recommendations for Radiation Oncologists to do peer to peer with MD Rojas and for pt to follow up with Dr. Adams to discuss treatment options.           Per Dr. Fields, 8/28/23:   "Mrs. Hancock is a 62-year-old lady with a history that dates to late 2022 when she had upper respiratory infection. This prompted medical attention that resulted in a chest x-ray revealing a left upper lobe mass. This was followed by CT of the chest that confirmed a left upper lobe mass with additional satellite nodules of the lobe as well as nodules of the left pleura. She also had a nodule noted at the right base. She was sent for PET CT scan and ultimately had bronchoscopic biopsy. The left upper lobe was confirmed to be consistent with adenocarcinoma of lung primary.    She was sent to me for recommendations in preparation for an attempt at definitive surgery. Unfortunately, during her attempt for surgical resection, she was found to have metastatic disease to the pleural space with malignant involvement of pleural effusion.     She went on to complete 4 cycles of palliative chemotherapy with carboplatin, pemetrexed, and pembrolizumab with mixed CT findings resulting in recommendations for continuation of chemotherapy with pemetrexed and pembrolizumab. She has remained on pemetrexed and pembrolizumab for ongoing palliative therapy.     Patient presents to clinic in f/u for lung cancer prior to continuation of systemic therapy. She continues maintenance pemetrexed plus pembrolizumab. Since her " "previous appointment she did have dermatology evaluation of a rash who performed punch biopsy. She was initiated on steroid topical by dermatology with improvement. In the interim she also reported recent increase in frontal headaches that resulted in ordering of MRI of the brain. Other than above noted rash and headache she has continued to tolerate palliative Pembro plus pemetrexed well. She currently denies any pain. She denies any shortness of breath. She denies any nausea or vomiting. She denies any diarrhea or constipation.    1. Metastatic left lung adenocarcinoma: She has completed 4 cycles of palliative chemotherapy with carboplatin, pemetrexed, and pembrolizumab with mixed CT findings resulting in recommendations for continuation of chemotherapy with pemetrexed and pembrolizumab. She has continued pemetrexed plus pembrolizumab for maintenance palliative therapy with positive radiographic response. CT scan results discussed in detail with patient. Multiple questions were asked by patient during discussion of CT scan results, all of which were answered in detail to her apparent satisfaction. With continued positive radiographic response and without dermatologic evaluation suggest immune mediated skin reaction, recommendations made for patient to continue pemetrexed plus pembrolizumab for ongoing palliative maintenance therapy. Patient verbalized understanding and agrees with recommendations as made.    Once again discussed the need to refrain from the use of systemic steroid therapy with immunotherapy given the theoretical risk that systemic steroid could augment the T-cell response.     2. Rash - s/p punch biopsy of left distal pretibial region on 8/3/2023 pathologic consistent with spongiotic dermatitis with eosinophils; negative for medication reaction per dermatology. She will continue topical management per dermatology recommendations. "     HPI:     JACOBO COLEMAN is a 63 y.o. 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 Pt is also s/p palliative XRT to T6-T9 vertebrae and adacent paravertebral L rib lesions (22.5 Gy/% Fx, EOT 1/18/24) Pt was previously treated by Dr. Hossein Fields ([facility]) and Dr. Cheney.    Last clinic 5/20/24, tolerating are lower negative except for acneiform rash.  Most recent imaging demonstrates stable appearance of JAZMYNE spiculated mass increasing with fibrosis.  Ophthalmology referral for increasingly dry eyes.  Arms and RTC in 1 month.  Plan to start hydrocortisone and clindamycin for rash.    Interval History:  - 5/28/24:  Patient cancelled ophthalmology appointment  - 6/11/24:  Patient with paronychia, recommended to start doxycycline, referral to Orthopedics in place.    Pt states that she developed diarrhea for around 2 weeks, and was having upwards of 10 episodes daily. She says that she attributes this to poor diet choices in the setting of IBS. She says that Mexican food in particular has been difficult to tolerate. She says that this has been improved since Friday. She was taking imodium up until that time, noting that imodium gives her a headache. Her last BM was last night, which was described as soft and formed, but not diarrhea. She says that she is having 2-3 bowel movements for the past few days. She says that she is feeling "gassy". She says this is not dissimilar from her general IBS symptoms. She notes that she had two days of nausea, for which she took pepcid.     Regarding the " "rash, she says that it covers her head and chest. She says that it "kind of comes and goes". She says that it is "not bad at all". She is not using the clindaymcyin gel, noting that she "stopped having puss come out of her face". She says that she is using the hydrocortisone as needed. She is not taking the doxycycline.     She says that she has "continuous pain, but it's tolerable" starting in the middle of her back up to the top of her neck. She says this progresses as the day goes on. She notes that she feels numbness in her R hand as the day goes on. She says this improves with "decompressing" her back.      She says that she has begun losing "a whole wad of hair" every day, which is "more than shedding".      Regarding the paronychia, she says this is on and off bothersome. She notes that she has been expressing puss from this area. She is uninterested in seeing somebody to help manage this.     Of note, she saw her corneal specialist (not at [facility]) who evaluated her and said that her eyes were not worrisome.     Past Medical History:   Past Medical History:   Diagnosis Date    Malignant neoplasm of upper lobe of left lung 1/19/2023    Thyroid disease         Past Surgical History:   Past Surgical History:   Procedure Laterality Date    CHOLECYSTECTOMY      ENDOBRONCHIAL ULTRASOUND Left 1/5/2023    Procedure: ENDOBRONCHIAL ULTRASOUND (EBUS);  Surgeon: Bam Catalan MD;  Location: [facility];  Service: Pulmonary;  Laterality: Left;    HYSTERECTOMY      NAVIGATIONAL BRONCHOSCOPY Left 1/5/2023    Procedure: BRONCHOSCOPY, NAVIGATIONAL;  Surgeon: Bam Catalan MD;  Location: [facility];  Service: Pulmonary;  Laterality: Left;        Family History:   Family History   Problem Relation Name Age of Onset    Heart failure Mother Christi     Diabetes Mother Christi     Rheum arthritis Father Rayray     Diabetes Brother Jamal         Social History:   Social History     Tobacco Use    Smoking status: Former     " Current packs/day: 0.00     Types: Cigarettes     Quit date:      Years since quittin.4     Passive exposure: Never    Smokeless tobacco: Never   Substance Use Topics    Alcohol use: Yes     Alcohol/week: 2.0 standard drinks of alcohol     Types: 2 Glasses of wine per week     Comment: socially        I have reviewed and updated the patient's past medical, surgical, family and social histories.      ROS:   As per HPI.     Allergies:   Review of patient's allergies indicates:   Allergen Reactions    Xylocaine with epinephrine [lidocaine-epinephrine] Anaphylaxis    Lipitor [atorvastatin] Other (See Comments)     Body aches      Methocarbamol-aspirin     Zetia [ezetimibe] Other (See Comments)     Muscle spasms      Augmentin [amoxicillin-pot clavulanate] Rash    Macrodantin [nitrofurantoin macrocrystal] Rash    Omnicef [cefdinir] Rash and Other (See Comments)     GI upset    Pravastatin Rash    Sulfa (sulfonamide antibiotics) Rash        Medications:   Current Outpatient Medications   Medication Sig Dispense Refill    albuterol (VENTOLIN HFA) 90 mcg/actuation inhaler Inhale 2 puffs into the lungs every 6 (six) hours as needed for Wheezing. Rescue 18 g 3    albuterol-ipratropium (DUO-NEB) 2.5 mg-0.5 mg/3 mL nebulizer solution Take 3 mLs by nebulization every 6 (six) hours as needed for Wheezing or Shortness of Breath. Rescue 300 mL 11    azelastine (ASTELIN) 137 mcg (0.1 %) nasal spray 1 spray by Nasal route 2 (two) times daily.      budesonide-glycopyr-formoterol 160-9-4.8 mcg/actuation HFAA Inhale 2 puffs into the lungs 2 (two) times daily. Wash out mouth after use 10.7 g 11    cetirizine (ZYRTEC) 10 MG tablet Take 5 mg by mouth once daily.      clindamycin phosphate 1% (CLINDAGEL) 1 % gel Apply topically 2 (two) times daily. 60 g 0    cycloSPORINE (RESTASIS) 0.05 % ophthalmic emulsion Place 1 drop into both eyes 2 (two) times a day.      doxycycline (VIBRAMYCIN) 100 MG Cap Take 1 capsule (100 mg total) by  mouth 2 (two) times daily. 60 capsule 1    erlotinib (TARCEVA) 150 MG tablet Take 1 tablet (150 mg total) by mouth once daily. 30 tablet 11    eszopiclone (LUNESTA) 3 mg Tab Take 3 mg by mouth every evening.      famotidine (PEPCID) 20 MG tablet Take 1 tablet (20 mg total) by mouth 2 (two) times daily as needed for Heartburn. 60 tablet 1    FLUoxetine 20 MG capsule Take 20 mg by mouth once daily.      fluticasone propionate (FLONASE) 50 mcg/actuation nasal spray 1 spray by Each Nostril route 2 (two) times daily.      hydrocortisone 2.5 % cream Apply topically 2 (two) times daily. 453.6 g 0    ibuprofen (ADVIL,MOTRIN) 200 MG tablet Take by mouth every 6 (six) hours as needed.      ipratropium (ATROVENT) 21 mcg (0.03 %) nasal spray SMARTSI Spray(s) Both Nares 2-3 Times Daily      levothyroxine (SYNTHROID) 88 MCG tablet Take 88 mcg by mouth every evening.      multivitamin capsule Take 1 capsule by mouth once daily.      mupirocin (BACTROBAN) 2 % ointment Apply topically 3 (three) times daily.      ondansetron (ZOFRAN-ODT) 4 MG TbDL Take 2 mg by mouth every 6 (six) hours as needed.      predniSONE (DELTASONE) 10 MG tablet Take 2 tabs (20 mg) in the morning and 2 tabs (20 mg) in the evening from 3/25/24-3/31/24; take 2 tabs (20 mg) in the morning and 1 tab (10 mg) in the evening from 24-24; take 1 tab (10 mg) in the morning and 1 tab (10 mg) in the evening from 24-24; take 1 tab (10 mg) in the morning from 4/15/24-24). 70 tablet 0    predniSONE (DELTASONE) 10 MG tablet Take 1 tablet (10 mg total) by mouth once daily. 30 tablet 1    promethazine (PHENERGAN) 25 MG tablet Take 25 mg by mouth every 6 (six) hours as needed.      traMADoL (ULTRAM) 50 mg tablet Take 1 tablet (50 mg total) by mouth every 6 (six) hours as needed for Pain. 45 tablet 0     No current facility-administered medications for this visit.          Physical Exam:       There were no vitals taken for this visit.                Physical Exam  Constitutional:       Appearance: Normal appearance.   HENT:      Head: Normocephalic and atraumatic.   Eyes:      Extraocular Movements: Extraocular movements intact.      Conjunctiva/sclera: Conjunctivae normal.      Pupils: Pupils are equal, round, and reactive to light.   Cardiovascular:      Rate and Rhythm: Normal rate and regular rhythm.      Heart sounds: No murmur heard.     No friction rub. No gallop.   Pulmonary:      Effort: Pulmonary effort is normal.      Breath sounds: No wheezing, rhonchi or rales.   Musculoskeletal:         General: Normal range of motion.   Skin:     General: Skin is warm and dry.      Comments: Acneiform rash involving face, chest, scalp; pictures in media tab   Neurological:      Mental Status: She is alert and oriented to person, place, and time.   Psychiatric:         Mood and Affect: Mood normal.         Thought Content: Thought content normal.         Judgment: Judgment normal.           Labs:   No results found for this or any previous visit (from the past 48 hour(s)).       Imaging:    See oncologic history above.     Path:  See oncologic history above.      Assessment and Plan:     JACOBO COLEMAN is a 63 y.o. 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 Pt is also s/p palliative XRT to T6-T9 vertebrae and adacent paravertebral L rib lesions (22.5 Gy/% Fx, EOT 1/18/24) Pt was previously treated by Dr. Hossein Fields ([facility]) and Dr. Cheney.    Stage IV Adenocarcinoma of  the JAZMYNE, EGFR L858R Mutated, PD-L1 5%  ECOG PS 1 (due to dyspnea, improving).  Patient is currently on erlotinib, treatment complicated by acneiform rash, diarrhea, paronychia, hair loss.  Discuss these symptoms at length as below, patient states she has not interested in a dose reduction or treatment holiday at this time (states that she feels all of her symptoms are slowly improving).  Most recent imaging (CT C, 5/13/24, after 2.5 month treatment break) demonstrates stable appearance of the JAZMYNE spiculated mass as well as increasing areas of likely radiation / drug induced fibrosis. Plan for monthly labs and in-person evaluations for 3 months followed by repeat imaging; if patient is tolerating well and no evidence of progression at that time, can expand to q3m evaluations.    PLAN:   -- Continue erlotinib 150 mg daily  -- Reiterated to take the erlotinib 10 hours after and 2 hours before famotidine  -- Labs and RTC in 1 month, pt prefers to follow at the [facility]  -- Repeat CT due around 8/5/24      Diarrhea  See HPI for description.  Patient feels there was a significant component of her IBS at play, as well as her diet.  Markedly improved over the past few days.  Discussed that, should this occur again in the future, she should reach out so that I can help with antidiarrheal control.  -- Continue imodium PRN  -- Consider lomotil should symptoms recur      Mid-Back Pain  Patient with worsening mid back pain, tender to palpation over spinous processes as well as bilateral paraspinous muscles (L>R).  She notes pain worsening throughout the day, and numbness in her R middle finger alone (not in her first or second fingers) which also worsened throughout the day and is improved with decompression .  Based on tenderness paraspinous muscles, may be muscular in origin, however notably patient did receive palliative radiation to metastatic disease in her T6-T9 vertebra.  In concert with potential radiculopathic symptoms,  will obtain MRI T-spine for further evaluation.  -- MRI T-spine ordered      Bony Mets  -- Discuss zometa at next visit      G1 EGFR Inhibitor-Induced Acneiform Rash  Patient initially developed an acneiform rash over her face, chest, and scalp.  It was mildly pruritic and tender. It covered ~10% of her body surface area.  Notably, she had not been using doxycycline or hydrocortisone prophylaxis as previously discussed.  Also notably, she was on steroids until 1 week prior which may have alleviated the initial symptoms.  Patient has since received clindamycin gel) used for a short period) and hydrocortisone 2.5%, used to good effect.  She continues to have some erythema but no further pustular lesions.  Patient states she does not want take doxycycline as this often causes her to have an upset stomach.  -- Continue hydrocortisone 1% cream prophylaxis  -- Continue clindamycin 1% gel  -- Discussed using sun protection (SPF, wide brimmed hat, sun shirts); of note, pt going to [place] next week      Paronychia  Paronychia on multiple fingers currently worse on left third finger.  Patient has been expressing pus from these.  Has been using hydrocortisone on these areas as well as bacitracin ointment.  Denies interest in further evaluation by specialist.  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  -- Continue hydrocortisone cream  -- CTM      Pneumonitis/Radiation Fibrosis  Dyspnea  See previous notes for description.  Most recent imaging demonstrates evolving likely radiation fibrosis.  Symptomatically, patient has improved in his no longer taking steroids.  She says that she has her good days and bad days, but notes that her sats are consistently above 90%.  She still has some difficulty when bending over.   -- CTM      Arthralgias  LE Cramps  Pt describes nightly cramps such that she has to awaken and walk for multiple hours (see HPI for description). Also  notes significant soreness in her knees. Notably, her current steroid regimen does not seem to be helping.   -- CTM      The above information has been reviewed with the patient and all questions have been answered to their apparent satisfaction.  They understand that they can call the clinic with any questions.    Marin Adams MD  Hematology/Oncology  [facility]      Route Chart for Scheduling    Med Onc Chart Routing      Follow up with physician . Labs and RTC in 4 weeks, [facility]   Follow up with [facility]    Infusion scheduling note    Injection scheduling note    Labs    Imaging   MRI T spine ASAP   Pharmacy appointment    Other referrals                    Disclaimer: This note was prepared using voice recognition system and is likely to have sound alike errors and is not proof read.  Please call me with any questions.

## 2024-07-09 ENCOUNTER — PATIENT MESSAGE (OUTPATIENT)
Dept: ADMINISTRATIVE | Facility: OTHER | Age: 64
End: 2024-07-09
Payer: COMMERCIAL

## 2024-07-10 ENCOUNTER — OFFICE VISIT (OUTPATIENT)
Dept: OPHTHALMOLOGY | Facility: CLINIC | Age: 64
End: 2024-07-10
Payer: COMMERCIAL

## 2024-07-10 ENCOUNTER — HOSPITAL ENCOUNTER (OUTPATIENT)
Dept: RADIOLOGY | Facility: HOSPITAL | Age: 64
Discharge: HOME | End: 2024-07-10
Attending: FAMILY MEDICINE
Payer: COMMERCIAL

## 2024-07-10 ENCOUNTER — OFFICE VISIT (OUTPATIENT)
Dept: PULMONOLOGY | Facility: CLINIC | Age: 64
End: 2024-07-10
Payer: COMMERCIAL

## 2024-07-10 VITALS
DIASTOLIC BLOOD PRESSURE: 62 MMHG | RESPIRATION RATE: 19 BRPM | SYSTOLIC BLOOD PRESSURE: 108 MMHG | HEART RATE: 80 BPM | BODY MASS INDEX: 24.87 KG/M2 | HEIGHT: 70 IN | WEIGHT: 173.75 LBS | OXYGEN SATURATION: 97 %

## 2024-07-10 DIAGNOSIS — H04.123 DRY EYES, BILATERAL: Primary | ICD-10-CM

## 2024-07-10 DIAGNOSIS — H02.834 DERMATOCHALASIS OF BOTH UPPER EYELIDS: ICD-10-CM

## 2024-07-10 DIAGNOSIS — C34.12 ADENOCARCINOMA OF UPPER LOBE OF LEFT LUNG: ICD-10-CM

## 2024-07-10 DIAGNOSIS — C34.12 ADENOCARCINOMA OF UPPER LOBE OF LEFT LUNG: Primary | ICD-10-CM

## 2024-07-10 DIAGNOSIS — H25.11 NUCLEAR SCLEROSIS OF RIGHT EYE: ICD-10-CM

## 2024-07-10 DIAGNOSIS — C79.51 SECONDARY MALIGNANT NEOPLASM OF BONE: Primary | ICD-10-CM

## 2024-07-10 DIAGNOSIS — H25.12 NUCLEAR SCLEROSIS OF LEFT EYE: ICD-10-CM

## 2024-07-10 DIAGNOSIS — J70.0 POST-RADIATION PNEUMONITIS: Chronic | ICD-10-CM

## 2024-07-10 DIAGNOSIS — H02.831 DERMATOCHALASIS OF BOTH UPPER EYELIDS: ICD-10-CM

## 2024-07-10 DIAGNOSIS — J18.9 PNEUMONIA OF BOTH LOWER LOBES DUE TO INFECTIOUS ORGANISM: ICD-10-CM

## 2024-07-10 PROCEDURE — 4010F ACE/ARB THERAPY RXD/TAKEN: CPT | Mod: CPTII,S$GLB,, | Performed by: INTERNAL MEDICINE

## 2024-07-10 PROCEDURE — 99999 PR PBB SHADOW E&M-EST. PATIENT-LVL V: CPT | Mod: PBBFAC,,, | Performed by: INTERNAL MEDICINE

## 2024-07-10 PROCEDURE — 99999 PR PBB SHADOW E&M-EST. PATIENT-LVL IV: CPT | Mod: PBBFAC,,, | Performed by: STUDENT IN AN ORGANIZED HEALTH CARE EDUCATION/TRAINING PROGRAM

## 2024-07-10 PROCEDURE — 99214 OFFICE O/P EST MOD 30 MIN: CPT | Mod: S$GLB,,, | Performed by: INTERNAL MEDICINE

## 2024-07-10 PROCEDURE — 1159F MED LIST DOCD IN RCRD: CPT | Mod: CPTII,S$GLB,, | Performed by: INTERNAL MEDICINE

## 2024-07-10 PROCEDURE — 71045 X-RAY EXAM CHEST 1 VIEW: CPT | Mod: TC

## 2024-07-10 PROCEDURE — 71045 X-RAY EXAM CHEST 1 VIEW: CPT | Mod: 26,,, | Performed by: RADIOLOGY

## 2024-07-10 PROCEDURE — 3008F BODY MASS INDEX DOCD: CPT | Mod: CPTII,S$GLB,, | Performed by: INTERNAL MEDICINE

## 2024-07-10 PROCEDURE — 1160F RVW MEDS BY RX/DR IN RCRD: CPT | Mod: CPTII,S$GLB,, | Performed by: INTERNAL MEDICINE

## 2024-07-10 PROCEDURE — 3078F DIAST BP <80 MM HG: CPT | Mod: CPTII,S$GLB,, | Performed by: INTERNAL MEDICINE

## 2024-07-10 PROCEDURE — 3074F SYST BP LT 130 MM HG: CPT | Mod: CPTII,S$GLB,, | Performed by: INTERNAL MEDICINE

## 2024-07-10 PROCEDURE — 3044F HG A1C LEVEL LT 7.0%: CPT | Mod: CPTII,S$GLB,, | Performed by: INTERNAL MEDICINE

## 2024-07-10 NOTE — PROGRESS NOTES
Subjective:      Patient ID: JACOBO COLEMAN is a 63 y.o. female.    Chief Complaint: f/u lung CA, pneumonitis    HPI    63 y.o. 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 Pt is also s/p palliative XRT to T6-T9 vertebrae and adacent paravertebral L rib lesions (22.5 Gy/% Fx, EOT 1/18/24) Pt was previously treated by Dr. Hossein Fields ([facility]) and Dr. Cheney.       2/24 and treated with Doxy and prednisone taper. She was seen by Dr. Cheney 2/28/24- CTA chest ordered to rule out PE as cause of shortness of breath. CTA was negative for PE- but with bilateral GGO, new from 12/2023. She was admitted to the hospital- treated with Levaquin and breathing treatments. Discharged 3/1 with po levaqin.      3/25/2024:  Patient states that, since tapering her prednisone from 20 b.i.d. to 20 daily, her shortness of breath has worsened.  She describes episodes of chest tightness and shortness of breath.  During these episodes, she has a cough productive of liquid sputum, sometimes described as frothy .  These episodes can occur either with exertion or at rest.  She notes intermittent desaturations, saying that she was in the mid 70s this morning.  She denies any chest pain.  She confirms that she is taking nebulizers as scheduled and also p.r.n..  She is also taking Mucinex 800 b.i.d..  She says that her appetite has been good.      April 2024  Feeling better overall while on her prednisone taper.  Just occasional dry cough no sputum production.  Denies fevers, chills and chest pain.  She  "does have episodes of bronchospasm which he treats with DuoNeb.  Previous visit she had a 6 minute walk test done that showed desaturation to 88% and she does have portable oxygen that she uses occasionally.  Otherwise no new complaints.  She is scheduled to start Erlotinib in the near future per Heme-Onc notes.     Current inhaler regimen is Breztri twice a day, duo neb every 6 hours as needed and albuterol via rescue inhaler p.r.n. also taking Mucinex 600 mg b.i.d.    July 2024  Here today for follow up of the above  Continues on Tarceva maintenance therapy  Recently saw Heme-Onc  Has not needed prednisone since our last visit  Exercise capacity has improved significantly  Overall feels better  Does have significant GI side-effects from Tarceva and inquired about possibility of converting back to Tagrisso    Review of Systems as per history of present illness otherwise negative  Objective:     Physical Exam   Constitutional: She is oriented to person, place, and time. She appears well-developed. No distress.   HENT:   Head: Normocephalic.   Cardiovascular: Normal rate.   Pulmonary/Chest: Normal expansion, symmetric chest wall expansion, effort normal and breath sounds normal.   Musculoskeletal:      Cervical back: Neck supple.   Neurological: She is alert and oriented to person, place, and time.   Psychiatric: She has a normal mood and affect.   Nursing note and vitals reviewed.          7/10/2024    10:33 AM 6/24/2024     1:35 PM 5/20/2024     1:09 PM 4/15/2024     1:08 PM 4/10/2024    11:02 AM 3/25/2024     1:22 PM 3/18/2024     1:15 PM   Pulmonary Function Tests   SpO2 97 % 95 % 90 % 94 % 93 % 90 % 96 %   Height 5' 10" (1.778 m) 5' 10" (1.778 m) 5' 10" (1.778 m) 5' 10" (1.778 m) 5' 10" (1.778 m) 5' 10" (1.778 m) 5' 10" (1.778 m)   Weight 78.8 kg (173 lb 11.6 oz) 80.3 kg (177 lb 0.5 oz) 80.7 kg (177 lb 14.6 oz) 80.4 kg (177 lb 4 oz) 79.6 kg (175 lb 7.8 oz) 78.2 kg (172 lb 6.4 oz) 77.2 kg (170 lb 3.1 oz)   BMI " (Calculated) 24.9 25.4 25.5 25.4 25.2 24.7 24.4        Assessment:     1. Adenocarcinoma of upper lobe of left lung    2. Post-radiation pneumonitis      I personally reviewed chest radiograph images obtained today.  This shows some mild right perihilar scarring but otherwise no acute process    Plan:     Overall stable symptom wise  Was able to decreased respiratory to just once a day and doing well with this regimen  I think on the whole it is more likely that her pneumonitis was radiation induced and not due to Tagrisso and is worth considering going back to this if Tarceva is not well tolerated and may lead to medication discontinuation  We will defer that decision to Heme-Onc  From a pulmonary standpoint she is stable to improved  I will see her back in 6 months, sooner if needed

## 2024-07-10 NOTE — PROGRESS NOTES
HPI    Referral from Dr. Gallegos. Pt states Dr. Adams wants to make sure the   medication she's using isn't affecting her eyes. Pt denies any ocular   pain, but states when she doesn't have her pro k's in her eyes get really   dry. Pt states glasses does not help her.    NP  Referral from Dr. Gallegos    Hx of Rk  Stokes vision  OD near/ OD distance  Wearing Pro K lens     Last edited by Marleni Hoang on 7/10/2024  9:25 AM.            Assessment /Plan     For exam results, see Encounter Report.    Adenocarcinoma of upper lobe of left lung  -  Continue care with oncology     Dry eyes, bilateral- ATs QID and lid hygiene w/ baby shampoo  No signs of trichiasis present and No signs of rash   Punctal plugs present in bilateral lower lids   Patient saw corneal specialist,   Start Restasis BID OU     Nuclear sclerosis of right eye  Nuclear sclerosis of left eye- - Not visually significant, monitor    *Patient gets [facility] in [place] and will return to them when she is able     Dermatochalasis- Overlapping lid margin. Patient does wish to pursue surgery. Lids causing daily functional issues.  Will place referral for        Return to clinic in 6M R/C Dry Eyes

## 2024-07-11 ENCOUNTER — TELEPHONE (OUTPATIENT)
Dept: OPHTHALMOLOGY | Facility: CLINIC | Age: 64
End: 2024-07-11
Payer: COMMERCIAL

## 2024-07-11 NOTE — TELEPHONE ENCOUNTER
----- Message from Irene Barreto MD sent at 7/11/2024  7:55 AM CDT -----  Regarding: lid eval schedule    ----- Message -----  From: Heather Woodson MD  Sent: 7/10/2024   9:44 AM CDT  To: Irene Barreto MD    Referral placed for lid eval

## 2024-07-15 ENCOUNTER — HOSPITAL ENCOUNTER (OUTPATIENT)
Dept: RADIOLOGY | Facility: HOSPITAL | Age: 64
Discharge: HOME | End: 2024-07-15
Attending: HOSPITALIST
Payer: COMMERCIAL

## 2024-07-15 DIAGNOSIS — C79.51 SECONDARY MALIGNANT NEOPLASM OF BONE: ICD-10-CM

## 2024-07-15 DIAGNOSIS — M54.10 BACK PAIN WITH RIGHT-SIDED RADICULOPATHY: ICD-10-CM

## 2024-07-15 PROCEDURE — 72157 MRI CHEST SPINE W/O & W/DYE: CPT | Mod: 26,,, | Performed by: RADIOLOGY

## 2024-07-15 PROCEDURE — 25500020 PHARM REV CODE 255: Performed by: HOSPITALIST

## 2024-07-15 PROCEDURE — A9585 GADOBUTROL INJECTION: HCPCS | Performed by: HOSPITALIST

## 2024-07-15 PROCEDURE — 72157 MRI CHEST SPINE W/O & W/DYE: CPT | Mod: TC

## 2024-07-15 RX ORDER — GADOBUTROL 604.72 MG/ML
10 INJECTION INTRAVENOUS
Status: COMPLETED | OUTPATIENT
Start: 2024-07-15 | End: 2024-07-15

## 2024-07-15 RX ADMIN — GADOBUTROL 7 ML: 604.72 INJECTION INTRAVENOUS at 03:07

## 2024-07-22 ENCOUNTER — OFFICE VISIT (OUTPATIENT)
Dept: HEMATOLOGY/ONCOLOGY | Facility: CLINIC | Age: 64
End: 2024-07-22
Payer: COMMERCIAL

## 2024-07-22 ENCOUNTER — LAB VISIT (OUTPATIENT)
Dept: LAB | Facility: HOSPITAL | Age: 64
End: 2024-07-22
Attending: HOSPITALIST
Payer: COMMERCIAL

## 2024-07-22 ENCOUNTER — PATIENT MESSAGE (OUTPATIENT)
Dept: HEMATOLOGY/ONCOLOGY | Facility: CLINIC | Age: 64
End: 2024-07-22

## 2024-07-22 VITALS
HEART RATE: 86 BPM | DIASTOLIC BLOOD PRESSURE: 78 MMHG | SYSTOLIC BLOOD PRESSURE: 128 MMHG | BODY MASS INDEX: 25.03 KG/M2 | RESPIRATION RATE: 20 BRPM | HEIGHT: 70 IN | WEIGHT: 174.81 LBS | OXYGEN SATURATION: 97 % | TEMPERATURE: 98 F

## 2024-07-22 DIAGNOSIS — R19.7 DIARRHEA, UNSPECIFIED TYPE: ICD-10-CM

## 2024-07-22 DIAGNOSIS — J70.0 RADIATION PNEUMONITIS: ICD-10-CM

## 2024-07-22 DIAGNOSIS — C79.51 SECONDARY MALIGNANT NEOPLASM OF BONE: ICD-10-CM

## 2024-07-22 DIAGNOSIS — C34.12 ADENOCARCINOMA OF UPPER LOBE OF LEFT LUNG: Primary | ICD-10-CM

## 2024-07-22 DIAGNOSIS — L03.012 PARONYCHIA OF FINGER OF LEFT HAND: ICD-10-CM

## 2024-07-22 DIAGNOSIS — C34.12 ADENOCARCINOMA OF UPPER LOBE OF LEFT LUNG: ICD-10-CM

## 2024-07-22 DIAGNOSIS — M54.10 BACK PAIN WITH RIGHT-SIDED RADICULOPATHY: ICD-10-CM

## 2024-07-22 DIAGNOSIS — L70.8 ACNEIFORM RASH: ICD-10-CM

## 2024-07-22 LAB
ALBUMIN SERPL BCP-MCNC: 3.8 G/DL (ref 3.5–5.2)
ALP SERPL-CCNC: 102 U/L (ref 55–135)
ALT SERPL W/O P-5'-P-CCNC: 34 U/L (ref 10–44)
ANION GAP SERPL CALC-SCNC: 11 MMOL/L (ref 8–16)
AST SERPL-CCNC: 30 U/L (ref 10–40)
BILIRUB SERPL-MCNC: 0.3 MG/DL (ref 0.1–1)
BUN SERPL-MCNC: 20 MG/DL (ref 8–23)
CALCIUM SERPL-MCNC: 9.3 MG/DL (ref 8.7–10.5)
CHLORIDE SERPL-SCNC: 110 MMOL/L (ref 95–110)
CO2 SERPL-SCNC: 22 MMOL/L (ref 23–29)
CREAT SERPL-MCNC: 1 MG/DL (ref 0.5–1.4)
ERYTHROCYTE [DISTWIDTH] IN BLOOD BY AUTOMATED COUNT: 14.9 % (ref 11.5–14.5)
EST. GFR  (NO RACE VARIABLE): >60 ML/MIN/1.73 M^2
GLUCOSE SERPL-MCNC: 86 MG/DL (ref 70–110)
HCT VFR BLD AUTO: 39.6 % (ref 37–48.5)
HGB BLD-MCNC: 12.5 G/DL (ref 12–16)
IMM GRANULOCYTES # BLD AUTO: 0.03 K/UL (ref 0–0.04)
MAGNESIUM SERPL-MCNC: 1.7 MG/DL (ref 1.6–2.6)
MCH RBC QN AUTO: 27.5 PG (ref 27–31)
MCHC RBC AUTO-ENTMCNC: 31.6 G/DL (ref 32–36)
MCV RBC AUTO: 87 FL (ref 82–98)
NEUTROPHILS # BLD AUTO: 5 K/UL (ref 1.8–7.7)
PLATELET # BLD AUTO: 336 K/UL (ref 150–450)
PMV BLD AUTO: 8.7 FL (ref 9.2–12.9)
POTASSIUM SERPL-SCNC: 4 MMOL/L (ref 3.5–5.1)
PROT SERPL-MCNC: 6.7 G/DL (ref 6–8.4)
RBC # BLD AUTO: 4.54 M/UL (ref 4–5.4)
SODIUM SERPL-SCNC: 143 MMOL/L (ref 136–145)
WBC # BLD AUTO: 8.02 K/UL (ref 3.9–12.7)

## 2024-07-22 PROCEDURE — 99215 OFFICE O/P EST HI 40 MIN: CPT | Mod: S$GLB,,, | Performed by: HOSPITALIST

## 2024-07-22 PROCEDURE — 3008F BODY MASS INDEX DOCD: CPT | Mod: CPTII,S$GLB,, | Performed by: HOSPITALIST

## 2024-07-22 PROCEDURE — 3078F DIAST BP <80 MM HG: CPT | Mod: CPTII,S$GLB,, | Performed by: HOSPITALIST

## 2024-07-22 PROCEDURE — 1159F MED LIST DOCD IN RCRD: CPT | Mod: CPTII,S$GLB,, | Performed by: HOSPITALIST

## 2024-07-22 PROCEDURE — 4010F ACE/ARB THERAPY RXD/TAKEN: CPT | Mod: CPTII,S$GLB,, | Performed by: HOSPITALIST

## 2024-07-22 PROCEDURE — 36415 COLL VENOUS BLD VENIPUNCTURE: CPT | Performed by: HOSPITALIST

## 2024-07-22 PROCEDURE — 80053 COMPREHEN METABOLIC PANEL: CPT | Performed by: HOSPITALIST

## 2024-07-22 PROCEDURE — 3044F HG A1C LEVEL LT 7.0%: CPT | Mod: CPTII,S$GLB,, | Performed by: HOSPITALIST

## 2024-07-22 PROCEDURE — 99999 PR PBB SHADOW E&M-EST. PATIENT-LVL III: CPT | Mod: PBBFAC,,, | Performed by: HOSPITALIST

## 2024-07-22 PROCEDURE — 83735 ASSAY OF MAGNESIUM: CPT | Performed by: HOSPITALIST

## 2024-07-22 PROCEDURE — 3074F SYST BP LT 130 MM HG: CPT | Mod: CPTII,S$GLB,, | Performed by: HOSPITALIST

## 2024-07-22 PROCEDURE — 85027 COMPLETE CBC AUTOMATED: CPT | Performed by: HOSPITALIST

## 2024-07-22 PROCEDURE — G2211 COMPLEX E/M VISIT ADD ON: HCPCS | Mod: S$GLB,,, | Performed by: HOSPITALIST

## 2024-07-22 RX ORDER — METHYLPREDNISOLONE 4 MG/1
TABLET ORAL
COMMUNITY
Start: 2024-03-05

## 2024-07-22 RX ORDER — CELECOXIB 100 MG/1
100 CAPSULE ORAL DAILY
Qty: 30 CAPSULE | Refills: 2 | Status: SHIPPED | OUTPATIENT
Start: 2024-07-22 | End: 2025-07-22

## 2024-07-22 RX ORDER — FLUTICASONE PROPIONATE AND SALMETEROL 250; 50 UG/1; UG/1
1 POWDER RESPIRATORY (INHALATION) 2 TIMES DAILY
COMMUNITY
Start: 2024-05-14

## 2024-07-22 RX ORDER — BENZONATATE 100 MG/1
200 CAPSULE ORAL 2 TIMES DAILY PRN
COMMUNITY
Start: 2024-02-24

## 2024-07-22 NOTE — PROGRESS NOTES
[facility] MEDICAL ONCOLOGY - FOLLOW UP VISIT    Reason for visit: Stage IV adenocarcinoma of the lung      Oncology History   Adenocarcinoma of upper lobe of left lung   1/5/2023 Procedure    Bronchoscopy  JAZMYNE Biopsy  - Adenocarcinoma, (TTF-1 positive, p40 negative)    [facility] NGS  - EGFR L858R mutation positive, VAF 34.8%  - PD-L1 5%     2/1/2023 Procedure    Attempted JAZMYNE Resection, Aborted for Pleural / Diaphragmatic Involvement  1. Diaphragm nodules, biopsy:                                               - Moderately differentiated adenocarcinoma.                           2. Parietal pleura, biopsies:                                               - Moderately differentiated adenocarcinoma.                           3. Pericardial nodule, biopsy:                                              - Moderately differentiated adenocarcinoma, see Comment.                 Comment; Immunohistochemistry was performed on tissue block 3A.         The adenocarcinoma is positive for TTF-1, Austin-EP4, cytokeratin 7        and Napsin-A. Calretinin, cytokeratin 20 and cytokeratin 5/6 are        negative. This immunophenotype supports pulmonary origin.              4. Diaphragm nodule, biopsy:                                                - Atypical TTF-1 positive cells, cannot exclude malignancy.           5. Left pleural fluid for cytology (ThinPrep, two cytospins, and           cell block):                                                             - Positive for malignant cells, consistent with metastatic             adenocarcinoma, pulmonary origin.                                         Comment; Immunohistochemistry was performed on cell block 5A.           The malignant cells are positive for Austin-EP4 and TTF-1.           [facility]  - EGFR L858R (VAF 46%), CTNNB1, CKS1B     9/8/2023 Cancer Staged    Staging form: Lung, AJCC 8th Edition  - Clinical stage from 9/8/2023:  Stage IV (ycT2, cN1, cM1)     2/28/2024 Imaging Significant Findings    CT C, Non-Con  - Interval development of extensive multifocal groundglass infiltrates in b/l upper lobes, lower lobes and right middle lobe   - 2.6 x 1.4 cm spiculated nodule in the left upper lobe with surrounding scarring, previously 3.0 x 1.7 cm   - 2 nodular infiltrates in the right lung base  - Stable sclerotic bone lesions in the thoracic spine consistent with osteoblastic metastasis      2/28/2024 - 3/1/2024 Hospital Admission    Admitted for SOB. Had not improved on doxycycline and prednisone. Started of levofloxacin.      3/22/2024 Imaging Significant Findings    CT C  - Unchanged 1.7 cm JAZMYNE lesion  - Interval reduction in ground-glass opacities, w/ residual architectural distortion and pleuroparenchymal scarring most compatible w/ sequela of prior viral pneumonitis  - Mild consolidation in the superior segment LLL  -      3/22/2024 Tumor Conference    [facility] Tumor Board  Difficult to determine extent of radiation vs drug-induced pneumonitis. There appear to be ground glass opacities outside of the treatment field, which would argue somewhat against a pure radiation pneumonitis.     Recommendations for Radiation Oncologists to do peer to peer with MD Rojas and for pt to follow up with Dr. Adams to discuss treatment options.        5/13/2024 Imaging Significant Findings    CT C  - Stable appearance of JAZMYNE spiculated mass w/ associated scarring anteriorly  - Additional increasing areas of fibrosis and pulmonary scarring including in the infrahilar region b/l extending posteriorly into lower lobes b/l, suggest changes secondary to localized radiation therapy  - No pathologic LN enlargement     7/15/2024 Imaging Significant Findings    MRI T Spine W/ and W/o  - osseous metastatic disease involving posterior aspect of T6 through T9, no pathologic compression fractures or evidence of epidural involvement  - Multilevel mild neural foraminal  "stenosis throughout thoracic spine  - No significant spinal canal stenosis     EGFR-related lung cancer   10/5/2023 Initial Diagnosis    EGFR-related lung cancer     10/6/2023 - 10/6/2023 Chemotherapy    Treatment Summary   Plan Name: OP OSIMERTINIB DAILY  Treatment Goal: Control  Status: Inactive  Start Date: 10/6/2023  End Date: 10/6/2023  Provider: Zenon Cehney MD  Chemotherapy: osimertinib (TAGRISSO) 80 mg Tab, 80 mg, Oral, Daily, 1 of 1 cycle, Start date: 10/6/2023, End date: 3/25/2024     3/22/2024 Tumor Conference    [facility] Tumor Board  Difficult to determine extent of radiation vs drug-induced pneumonitis. There appear to be ground glass opacities outside of the treatment field, which would argue somewhat against a pure radiation pneumonitis.     Recommendations for Radiation Oncologists to do peer to peer with MD Rojas and for pt to follow up with Dr. Adams to discuss treatment options.           Per Dr. Fields, 8/28/23:   "Mrs. Hancock is a 62-year-old lady with a history that dates to late 2022 when she had upper respiratory infection. This prompted medical attention that resulted in a chest x-ray revealing a left upper lobe mass. This was followed by CT of the chest that confirmed a left upper lobe mass with additional satellite nodules of the lobe as well as nodules of the left pleura. She also had a nodule noted at the right base. She was sent for PET CT scan and ultimately had bronchoscopic biopsy. The left upper lobe was confirmed to be consistent with adenocarcinoma of lung primary.    She was sent to me for recommendations in preparation for an attempt at definitive surgery. Unfortunately, during her attempt for surgical resection, she was found to have metastatic disease to the pleural space with malignant involvement of pleural effusion.     She went on to complete 4 cycles of palliative chemotherapy with carboplatin, pemetrexed, and pembrolizumab with mixed CT findings resulting in " recommendations for continuation of chemotherapy with pemetrexed and pembrolizumab. She has remained on pemetrexed and pembrolizumab for ongoing palliative therapy.     Patient presents to clinic in f/u for lung cancer prior to continuation of systemic therapy. She continues maintenance pemetrexed plus pembrolizumab. Since her previous appointment she did have dermatology evaluation of a rash who performed punch biopsy. She was initiated on steroid topical by dermatology with improvement. In the interim she also reported recent increase in frontal headaches that resulted in ordering of MRI of the brain. Other than above noted rash and headache she has continued to tolerate palliative Pembro plus pemetrexed well. She currently denies any pain. She denies any shortness of breath. She denies any nausea or vomiting. She denies any diarrhea or constipation.    1. Metastatic left lung adenocarcinoma: She has completed 4 cycles of palliative chemotherapy with carboplatin, pemetrexed, and pembrolizumab with mixed CT findings resulting in recommendations for continuation of chemotherapy with pemetrexed and pembrolizumab. She has continued pemetrexed plus pembrolizumab for maintenance palliative therapy with positive radiographic response. CT scan results discussed in detail with patient. Multiple questions were asked by patient during discussion of CT scan results, all of which were answered in detail to her apparent satisfaction. With continued positive radiographic response and without dermatologic evaluation suggest immune mediated skin reaction, recommendations made for patient to continue pemetrexed plus pembrolizumab for ongoing palliative maintenance therapy. Patient verbalized understanding and agrees with recommendations as made.    Once again discussed the need to refrain from the use of systemic steroid therapy with immunotherapy given the theoretical risk that systemic steroid could augment the T-cell response.  "    2. Rash - s/p punch biopsy of left distal pretibial region on 8/3/2023 pathologic consistent with spongiotic dermatitis with eosinophils; negative for medication reaction per dermatology. She will continue topical management per dermatology recommendations. "           HPI:     JACOBO COLEMAN is a 63 y.o. 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 Pt is also s/p palliative XRT to T6-T9 vertebrae and adacent paravertebral L rib lesions (22.5 Gy/% Fx, EOT 1/18/24) Pt was previously treated by Dr. Hossein Fields ([facility]) and Dr. Cheney.    Last clinic 6/24/24, continue erlotinib, labs and RTC in 1 month, repeat imaging due around 8524.  Ongoing again from rash, diarrhea, paronychia, hair loss.    Interval History:  - 7/10/24: Ophthalmology, start Restasis  - 7/10/24:  Pulmonology (Dr. Catalan), has not needed prednisone since last visit, exercise capacity improved significantly, does have GI side effects from or lapatinib.  Favor pneumonitis was more radiation induced than osimertinib induced.  - 7/15/24: MRI T spine, as above    When asked how she is doing, the pt states "I don't like how I feel". She notes an intermittent relapsing rash over most of her body. She is not taking the doxycyline. She uses the hydrocortisone cream once a day, chiefly only over her face - she went back to 2.5% yesterday. She does the topical clindamycin every 2-3 days.     She notes ongoing alopecia, has a picture demonstrating large clumps of hair. " "She says that her eyebrows are falling.    She continues to have diarrhea 1-2x daily. She says that she has "one constipated stool at night, and then 1 or 2 diarrhea stools in the morning". She confirms that she is still taking imodium, 1-2x daily.     She says that the paronychia over her L third finger continues to heal and come back. She notes paronychia over her L second toe, as well as her R fourth finger.     She says that her breathing has "gotten worse" over the past week. She says that she is able to do what she wants to do, and notes that her sats have been "running good".     She says that her back pain is persistent. She is taking advil for. She previously tried tramadol, which she says didn't help at all. Flexeril likewise hasn't helped.     Past Medical History:   Past Medical History:   Diagnosis Date    Malignant neoplasm of upper lobe of left lung 2023    Post-radiation pneumonitis 07/10/2024    Thyroid disease         Past Surgical History:   Past Surgical History:   Procedure Laterality Date    CHOLECYSTECTOMY      ENDOBRONCHIAL ULTRASOUND Left 2023    Procedure: ENDOBRONCHIAL ULTRASOUND (EBUS);  Surgeon: Bam Catalan MD;  Location: [facility];  Service: Pulmonary;  Laterality: Left;    HYSTERECTOMY      NAVIGATIONAL BRONCHOSCOPY Left 2023    Procedure: BRONCHOSCOPY, NAVIGATIONAL;  Surgeon: Bam Catalan MD;  Location: [facility];  Service: Pulmonary;  Laterality: Left;        Family History:   Family History   Problem Relation Name Age of Onset    Heart failure Mother Christi     Diabetes Mother Christi     Rheum arthritis Father Rayray     Diabetes Brother Jamal         Social History:   Social History     Tobacco Use    Smoking status: Former     Current packs/day: 0.00     Types: Cigarettes     Quit date:      Years since quittin.5     Passive exposure: Never    Smokeless tobacco: Never   Substance Use Topics    Alcohol use: Yes     Alcohol/week: 2.0 standard drinks " of alcohol     Types: 2 Glasses of wine per week     Comment: socially        I have reviewed and updated the patient's past medical, surgical, family and social histories.      ROS:   As per HPI.     Allergies:   Review of patient's allergies indicates:   Allergen Reactions    Xylocaine with epinephrine [lidocaine-epinephrine] Anaphylaxis    Lipitor [atorvastatin] Other (See Comments)     Body aches      Methocarbamol-aspirin     Zetia [ezetimibe] Other (See Comments)     Muscle spasms      Augmentin [amoxicillin-pot clavulanate] Rash    Macrodantin [nitrofurantoin macrocrystal] Rash    Omnicef [cefdinir] Rash and Other (See Comments)     GI upset    Pravastatin Rash    Sulfa (sulfonamide antibiotics) Rash        Medications:   Current Outpatient Medications   Medication Sig Dispense Refill    albuterol (VENTOLIN HFA) 90 mcg/actuation inhaler Inhale 2 puffs into the lungs every 6 (six) hours as needed for Wheezing. Rescue 18 g 3    albuterol-ipratropium (DUO-NEB) 2.5 mg-0.5 mg/3 mL nebulizer solution Take 3 mLs by nebulization every 6 (six) hours as needed for Wheezing or Shortness of Breath. Rescue 300 mL 11    azelastine (ASTELIN) 137 mcg (0.1 %) nasal spray 1 spray by Nasal route 2 (two) times daily.      budesonide-glycopyr-formoterol 160-9-4.8 mcg/actuation HFAA Inhale 2 puffs into the lungs 2 (two) times daily. Wash out mouth after use 10.7 g 11    cetirizine (ZYRTEC) 10 MG tablet Take 5 mg by mouth once daily.      clindamycin phosphate 1% (CLINDAGEL) 1 % gel Apply topically 2 (two) times daily. 60 g 0    cycloSPORINE (RESTASIS) 0.05 % ophthalmic emulsion Place 1 drop into both eyes 2 (two) times a day.      doxycycline (VIBRAMYCIN) 100 MG Cap Take 1 capsule (100 mg total) by mouth 2 (two) times daily. 60 capsule 1    erlotinib (TARCEVA) 150 MG tablet Take 1 tablet (150 mg total) by mouth once daily. 30 tablet 11    eszopiclone (LUNESTA) 3 mg Tab Take 3 mg by mouth every evening.      famotidine (PEPCID) 20  MG tablet Take 1 tablet (20 mg total) by mouth 2 (two) times daily as needed for Heartburn. 60 tablet 1    FLUoxetine 20 MG capsule Take 20 mg by mouth once daily.      fluticasone propionate (FLONASE) 50 mcg/actuation nasal spray 1 spray by Each Nostril route 2 (two) times daily.      hydrocortisone 2.5 % cream Apply topically 2 (two) times daily. 453.6 g 0    ibuprofen (ADVIL,MOTRIN) 200 MG tablet Take by mouth every 6 (six) hours as needed.      ipratropium (ATROVENT) 21 mcg (0.03 %) nasal spray SMARTSI Spray(s) Both Nares 2-3 Times Daily      levothyroxine (SYNTHROID) 88 MCG tablet Take 88 mcg by mouth every evening.      levothyroxine (SYNTHROID) 88 MCG tablet Take 88 mcg by mouth.      multivitamin capsule Take 1 capsule by mouth once daily.      mupirocin (BACTROBAN) 2 % ointment Apply topically 3 (three) times daily.      ondansetron (ZOFRAN-ODT) 4 MG TbDL Take 2 mg by mouth every 6 (six) hours as needed.      predniSONE (DELTASONE) 10 MG tablet Take 2 tabs (20 mg) in the morning and 2 tabs (20 mg) in the evening from 3/25/24-3/31/24; take 2 tabs (20 mg) in the morning and 1 tab (10 mg) in the evening from 24-24; take 1 tab (10 mg) in the morning and 1 tab (10 mg) in the evening from 24-24; take 1 tab (10 mg) in the morning from 4/15/24-24). 70 tablet 0    predniSONE (DELTASONE) 10 MG tablet Take 1 tablet (10 mg total) by mouth once daily. 30 tablet 1    promethazine (PHENERGAN) 25 MG tablet Take 25 mg by mouth every 6 (six) hours as needed.      traMADoL (ULTRAM) 50 mg tablet Take 1 tablet (50 mg total) by mouth every 6 (six) hours as needed for Pain. 45 tablet 0     No current facility-administered medications for this visit.          Physical Exam:       There were no vitals taken for this visit.               Physical Exam  Constitutional:       Appearance: Normal appearance.   HENT:      Head: Normocephalic and atraumatic.   Eyes:      Extraocular Movements: Extraocular  movements intact.      Conjunctiva/sclera: Conjunctivae normal.      Pupils: Pupils are equal, round, and reactive to light.   Cardiovascular:      Rate and Rhythm: Normal rate and regular rhythm.      Heart sounds: No murmur heard.     No friction rub. No gallop.   Pulmonary:      Effort: Pulmonary effort is normal.      Breath sounds: No wheezing, rhonchi or rales.   Musculoskeletal:         General: Normal range of motion.   Skin:     General: Skin is warm and dry.      Comments: Acneiform rash involving face, chest, scalp, arms; pictures in media tab   Neurological:      Mental Status: She is alert and oriented to person, place, and time.   Psychiatric:         Mood and Affect: Mood normal.         Thought Content: Thought content normal.         Judgment: Judgment normal.         Labs:   No results found for this or any previous visit (from the past 48 hour(s)).       Imaging:    See oncologic history above.     Path:  See oncologic history above.      Assessment and Plan:     JACOBO COLEMAN is a 63 y.o. 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 Pt is also s/p palliative XRT to T6-T9 vertebrae and adacent paravertebral L rib lesions (22.5 Gy/% Fx, EOT 1/18/24) Pt was previously treated by Dr. Hossein Fields ([facility]) and Dr. Cheney.    Stage IV Adenocarcinoma of the JAZMYNE, EGFR L858R Mutated, PD-L1 5%  ECOG PS 1 (due to dyspnea, improving).  Patient is currently on erlotinib, treatment complicated by acneiform rash, diarrhea,  paronychia, hair loss.  Discussed these symptoms at length as below, patient previously stated she was not interested in a dose reduction or treatment holiday, though now endorses some interest in considering resuming osimertinib stating that she does not like the way she feels .  Most recent imaging (CT C, 5/13/24, after 2.5 month treatment break) demonstrated stable appearance of the JAZMYNE spiculated mass as well as increasing areas of likely radiation / drug induced fibrosis.  First on-treatment imaging due within 2 weeks; after conversation with the patient, plan to continue full dose erlotinib until that time and, pending results, consider dose reduction of erlotinib versus possible resumption of osimertinib (see previous notes regarding avoidance of osimertinib in the setting of pneumonitis).      PLAN:   -- Continue erlotinib 150 mg daily  -- Previously reiterated to take the erlotinib 10 hours after and 2 hours before famotidine  -- CT C/A/P in 2 weeks, w/ RTC afterwards (pt prefers to follow at the [place]). Will discuss dosing considerations for tolerance at that time      Diarrhea  See HPI for description. Stable at 1-2 episodes daily, likely related to erlotinib.  -- Continue imodium PRN  -- Consider lomotil should symptoms worsen.      Mid-Back Pain  Patient with worsening mid back pain, tender to palpation over spinous processes as well as bilateral paraspinous muscles (L>R).  She notes pain worsening throughout the day, and numbness in her R middle finger alone (not in her first or second fingers) which also worsened throughout the day and is improved with decompression .  Based on tenderness paraspinous muscles, may be muscular in origin, however notably patient did receive palliative radiation to metastatic disease in her T6-T9 vertebra.  In concert with potential radiculopathic symptoms, MRI T spine was obtained and was without evidence of compression fracture or evidence of epidural involvement /  "canal stenosis. Pain somewhat manageable with ibuprofen, pt states she previously utilized celecoxib to good effect. She would like to avoid opioids at this time, if able.   -- Celecoxib 100 mg daily prescribed  -- Message sent to radiation oncology  -- Palliative care referral in place      Anxiety  Panic Attacks  Pt describes "panic attacks" which last ~5 minutes and occur around one time a day. She says that she would like something "on standby" for this. She says hydroxyzine has not been helpful. She is currently on fluoxetine. She says that she currently has to "calm myself down".   -- Referral to palliative care      Bony Mets  -- Discuss zometa at next visit      G1 EGFR Inhibitor-Induced Acneiform Rash  Patient initially developed an acneiform rash over her face, chest, and scalp.  It was mildly pruritic and tender. It covered ~10% of her body surface area.  Notably, she had not been using doxycycline or hydrocortisone prophylaxis as previously discussed.  Also notably, she was on steroids until 1 week prior which may have alleviated the initial symptoms.  Patient has since received clindamycin gel  and hydrocortisone 2.5%, used to moderate effect.  She continues to have some erythema but no further pustular lesions.  Patient states she does not want take doxycycline as this often causes her to have an upset stomach.  -- Continue hydrocortisone 1% cream prophylaxis, 2.5% for areas of eruption  -- Continue clindamycin 1% gel, reiterated to use this daily  -- Discussed using sun protection (SPF, wide brimmed hat, sun shirts); of note, pt going to [place] next week      Paronychia  Paronychia on multiple fingers currently worse on left third finger.  Patient has been expressing pus from these.  Has been using hydrocortisone on these areas as well as bacitracin ointment.  Denies interest in further evaluation by specialist.  Patient to continue monitoring more alert with any signs of infection (patient " is a nurse).  -- Continue hydrocortisone cream  -- CTM      Pneumonitis/Radiation Fibrosis  Dyspnea  See previous notes for description.  Most recent imaging demonstrates evolving likely radiation fibrosis.  Symptomatically, patient has improved in his no longer taking steroids.  She says that she has her good days and bad days, but notes that her sats are consistently above 90%.  She still has some difficulty when bending over.   -- CTM      The above information has been reviewed with the patient and all questions have been answered to their apparent satisfaction.  They understand that they can call the clinic with any questions.    Marin Adams MD  Hematology/Oncology  [facility]      Route Chart for Scheduling    Med Onc Chart Routing      Follow up with physician . CT CAP in 2 weeks, RTC at least 1 day afterwards   Follow up with [facility]    Infusion scheduling note    Injection scheduling note    Labs    Imaging    Pharmacy appointment    Other referrals         Palliative care referral           Disclaimer: This note was prepared using voice recognition system and is likely to have sound alike errors and is not proof read.  Please call me with any questions.

## 2024-07-22 NOTE — Clinical Note
Just an FYI that she's having some ongoing back pain. MRI T spine doesn't really look any worse (was maybe having some radiculopathic symptoms), going to try to manage medically.

## 2024-08-05 ENCOUNTER — HOSPITAL ENCOUNTER (OUTPATIENT)
Dept: RADIOLOGY | Facility: HOSPITAL | Age: 64
Discharge: HOME | End: 2024-08-05
Attending: HOSPITALIST
Payer: COMMERCIAL

## 2024-08-05 DIAGNOSIS — C34.12 ADENOCARCINOMA OF UPPER LOBE OF LEFT LUNG: ICD-10-CM

## 2024-08-05 PROCEDURE — 25500020 PHARM REV CODE 255: Performed by: HOSPITALIST

## 2024-08-05 PROCEDURE — 74177 CT ABD & PELVIS W/CONTRAST: CPT | Mod: 26,,, | Performed by: RADIOLOGY

## 2024-08-05 PROCEDURE — 74177 CT ABD & PELVIS W/CONTRAST: CPT | Mod: TC

## 2024-08-05 PROCEDURE — 71260 CT THORAX DX C+: CPT | Mod: 26,,, | Performed by: RADIOLOGY

## 2024-08-05 PROCEDURE — 71260 CT THORAX DX C+: CPT | Mod: TC

## 2024-08-05 RX ADMIN — IOHEXOL 100 ML: 350 INJECTION, SOLUTION INTRAVENOUS at 12:08

## 2024-08-06 ENCOUNTER — OFFICE VISIT (OUTPATIENT)
Dept: PALLIATIVE MEDICINE | Facility: CLINIC | Age: 64
End: 2024-08-06
Payer: COMMERCIAL

## 2024-08-06 ENCOUNTER — OFFICE VISIT (OUTPATIENT)
Dept: HEMATOLOGY/ONCOLOGY | Facility: CLINIC | Age: 64
End: 2024-08-06
Payer: COMMERCIAL

## 2024-08-06 VITALS
RESPIRATION RATE: 18 BRPM | OXYGEN SATURATION: 95 % | WEIGHT: 176.38 LBS | HEIGHT: 70 IN | TEMPERATURE: 98 F | HEART RATE: 86 BPM | SYSTOLIC BLOOD PRESSURE: 120 MMHG | DIASTOLIC BLOOD PRESSURE: 69 MMHG | HEART RATE: 86 BPM | TEMPERATURE: 98 F | BODY MASS INDEX: 25.25 KG/M2 | BODY MASS INDEX: 25.25 KG/M2 | DIASTOLIC BLOOD PRESSURE: 69 MMHG | HEIGHT: 70 IN | WEIGHT: 176.38 LBS | OXYGEN SATURATION: 94 % | SYSTOLIC BLOOD PRESSURE: 120 MMHG

## 2024-08-06 DIAGNOSIS — C79.51 SECONDARY MALIGNANT NEOPLASM OF BONE: ICD-10-CM

## 2024-08-06 DIAGNOSIS — G89.29 CHRONIC LEFT-SIDED THORACIC BACK PAIN: ICD-10-CM

## 2024-08-06 DIAGNOSIS — Z51.5 PALLIATIVE CARE ENCOUNTER: ICD-10-CM

## 2024-08-06 DIAGNOSIS — M54.10 BACK PAIN WITH RIGHT-SIDED RADICULOPATHY: ICD-10-CM

## 2024-08-06 DIAGNOSIS — L03.012 PARONYCHIA OF FINGER OF LEFT HAND: ICD-10-CM

## 2024-08-06 DIAGNOSIS — F51.01 PRIMARY INSOMNIA: ICD-10-CM

## 2024-08-06 DIAGNOSIS — R19.7 DIARRHEA, UNSPECIFIED TYPE: ICD-10-CM

## 2024-08-06 DIAGNOSIS — G89.3 NEOPLASM RELATED PAIN: ICD-10-CM

## 2024-08-06 DIAGNOSIS — L70.8 ACNEIFORM RASH: ICD-10-CM

## 2024-08-06 DIAGNOSIS — M54.6 CHRONIC LEFT-SIDED THORACIC BACK PAIN: ICD-10-CM

## 2024-08-06 DIAGNOSIS — J70.0 RADIATION PNEUMONITIS: ICD-10-CM

## 2024-08-06 DIAGNOSIS — C34.12 ADENOCARCINOMA OF UPPER LOBE OF LEFT LUNG: Primary | ICD-10-CM

## 2024-08-06 DIAGNOSIS — F41.9 ANXIETY: ICD-10-CM

## 2024-08-06 PROCEDURE — 99999 PR PBB SHADOW E&M-EST. PATIENT-LVL IV: CPT | Mod: PBBFAC,,,

## 2024-08-06 PROCEDURE — 3044F HG A1C LEVEL LT 7.0%: CPT | Mod: CPTII,S$GLB,, | Performed by: HOSPITALIST

## 2024-08-06 PROCEDURE — G2211 COMPLEX E/M VISIT ADD ON: HCPCS | Mod: S$GLB,,, | Performed by: HOSPITALIST

## 2024-08-06 PROCEDURE — 3008F BODY MASS INDEX DOCD: CPT | Mod: CPTII,S$GLB,,

## 2024-08-06 PROCEDURE — 4010F ACE/ARB THERAPY RXD/TAKEN: CPT | Mod: CPTII,S$GLB,,

## 2024-08-06 PROCEDURE — 99999 PR PBB SHADOW E&M-EST. PATIENT-LVL III: CPT | Mod: PBBFAC,,, | Performed by: HOSPITALIST

## 2024-08-06 PROCEDURE — 3074F SYST BP LT 130 MM HG: CPT | Mod: CPTII,S$GLB,,

## 2024-08-06 PROCEDURE — 4010F ACE/ARB THERAPY RXD/TAKEN: CPT | Mod: CPTII,S$GLB,, | Performed by: HOSPITALIST

## 2024-08-06 PROCEDURE — 99215 OFFICE O/P EST HI 40 MIN: CPT | Mod: S$GLB,,, | Performed by: HOSPITALIST

## 2024-08-06 PROCEDURE — 99214 OFFICE O/P EST MOD 30 MIN: CPT | Mod: S$GLB,,,

## 2024-08-06 PROCEDURE — 3078F DIAST BP <80 MM HG: CPT | Mod: CPTII,S$GLB,,

## 2024-08-06 PROCEDURE — 3078F DIAST BP <80 MM HG: CPT | Mod: CPTII,S$GLB,, | Performed by: HOSPITALIST

## 2024-08-06 PROCEDURE — 3074F SYST BP LT 130 MM HG: CPT | Mod: CPTII,S$GLB,, | Performed by: HOSPITALIST

## 2024-08-06 PROCEDURE — 1159F MED LIST DOCD IN RCRD: CPT | Mod: CPTII,S$GLB,,

## 2024-08-06 PROCEDURE — 3008F BODY MASS INDEX DOCD: CPT | Mod: CPTII,S$GLB,, | Performed by: HOSPITALIST

## 2024-08-06 PROCEDURE — 1159F MED LIST DOCD IN RCRD: CPT | Mod: CPTII,S$GLB,, | Performed by: HOSPITALIST

## 2024-08-06 PROCEDURE — 3044F HG A1C LEVEL LT 7.0%: CPT | Mod: CPTII,S$GLB,,

## 2024-08-06 RX ORDER — BACLOFEN 5 MG/1
5 TABLET ORAL DAILY PRN
Qty: 10 TABLET | Refills: 0 | Status: SHIPPED | OUTPATIENT
Start: 2024-08-06 | End: 2024-08-16

## 2024-08-07 DIAGNOSIS — C34.12 ADENOCARCINOMA OF UPPER LOBE OF LEFT LUNG: Primary | ICD-10-CM

## 2024-08-08 PROBLEM — F51.01 PRIMARY INSOMNIA: Status: ACTIVE | Noted: 2024-08-08

## 2024-08-08 PROBLEM — F41.9 ANXIETY: Status: ACTIVE | Noted: 2024-08-08

## 2024-08-08 PROBLEM — Z51.5 PALLIATIVE CARE ENCOUNTER: Status: ACTIVE | Noted: 2024-08-08

## 2024-08-08 PROBLEM — G89.29 CHRONIC LEFT-SIDED THORACIC BACK PAIN: Status: ACTIVE | Noted: 2024-08-08

## 2024-08-08 PROBLEM — M54.6 CHRONIC LEFT-SIDED THORACIC BACK PAIN: Status: ACTIVE | Noted: 2024-08-08

## 2024-08-08 PROBLEM — G89.3 NEOPLASM RELATED PAIN: Status: ACTIVE | Noted: 2024-08-08

## 2024-08-09 ENCOUNTER — PATIENT MESSAGE (OUTPATIENT)
Dept: HEMATOLOGY/ONCOLOGY | Facility: CLINIC | Age: 64
End: 2024-08-09
Payer: COMMERCIAL

## 2024-09-06 ENCOUNTER — PATIENT MESSAGE (OUTPATIENT)
Dept: ADMINISTRATIVE | Facility: OTHER | Age: 64
End: 2024-09-06
Payer: COMMERCIAL

## 2024-09-19 ENCOUNTER — OFFICE VISIT (OUTPATIENT)
Dept: PAIN MEDICINE | Facility: CLINIC | Age: 64
End: 2024-09-19
Payer: COMMERCIAL

## 2024-09-19 VITALS
HEART RATE: 87 BPM | HEIGHT: 70 IN | WEIGHT: 174.38 LBS | DIASTOLIC BLOOD PRESSURE: 83 MMHG | BODY MASS INDEX: 24.97 KG/M2 | SYSTOLIC BLOOD PRESSURE: 122 MMHG | RESPIRATION RATE: 16 BRPM

## 2024-09-19 DIAGNOSIS — G89.12 POST-THORACOTOMY PAIN SYNDROME: Primary | ICD-10-CM

## 2024-09-19 DIAGNOSIS — G89.3 CANCER RELATED PAIN: ICD-10-CM

## 2024-09-19 DIAGNOSIS — C79.51 METASTASIS TO SPINAL COLUMN: ICD-10-CM

## 2024-09-19 PROCEDURE — 99204 OFFICE O/P NEW MOD 45 MIN: CPT | Mod: S$GLB,,, | Performed by: PHYSICAL MEDICINE & REHABILITATION

## 2024-09-19 PROCEDURE — G2211 COMPLEX E/M VISIT ADD ON: HCPCS | Mod: S$GLB,,, | Performed by: PHYSICAL MEDICINE & REHABILITATION

## 2024-09-19 PROCEDURE — 3079F DIAST BP 80-89 MM HG: CPT | Mod: CPTII,S$GLB,, | Performed by: PHYSICAL MEDICINE & REHABILITATION

## 2024-09-19 PROCEDURE — 1159F MED LIST DOCD IN RCRD: CPT | Mod: CPTII,S$GLB,, | Performed by: PHYSICAL MEDICINE & REHABILITATION

## 2024-09-19 PROCEDURE — 99999 PR PBB SHADOW E&M-EST. PATIENT-LVL V: CPT | Mod: PBBFAC,,, | Performed by: PHYSICAL MEDICINE & REHABILITATION

## 2024-09-19 PROCEDURE — 3074F SYST BP LT 130 MM HG: CPT | Mod: CPTII,S$GLB,, | Performed by: PHYSICAL MEDICINE & REHABILITATION

## 2024-09-19 PROCEDURE — 4010F ACE/ARB THERAPY RXD/TAKEN: CPT | Mod: CPTII,S$GLB,, | Performed by: PHYSICAL MEDICINE & REHABILITATION

## 2024-09-19 PROCEDURE — 3044F HG A1C LEVEL LT 7.0%: CPT | Mod: CPTII,S$GLB,, | Performed by: PHYSICAL MEDICINE & REHABILITATION

## 2024-09-19 PROCEDURE — 3008F BODY MASS INDEX DOCD: CPT | Mod: CPTII,S$GLB,, | Performed by: PHYSICAL MEDICINE & REHABILITATION

## 2024-09-19 NOTE — PROGRESS NOTES
New Patient Chronic Pain Note (Initial Visit)    Referring Physician: Caro Davis, *    PCP: Caro Davis NP    Chief Complaint:   Chief Complaint   Patient presents with    Back Pain     Patient has thoracic back pain at bra line that wraps around to right side by ribs and then pain goes upwards to  neck more on left side than right.  Pain level 5/10         SUBJECTIVE:    JACOBO COLEMAN is a 63 y.o. female who presents to the clinic for the evaluation of back and leg pain.  She was referred by her primary care provider for further evaluation and management of this pain.  She has past medical history of anxiety, lung cancer s/p chemotherapy and radiation, hypercholesterolemia, immunodeficiency, DM2, hypothyroidism, and multiple other medical comorbidities as listed in her chart.  The pain started about 2 years ago following metastatic spread to or thoracic spine her primary lung cancer and also subsequent vats procedure and symptoms have been unchanged.The pain is located in the left midthoracic area and radiates to the left anterior and lateral chest wall.  The pain is described as  sharp, stabbing, aching, dull  and is rated as 5/10. The pain is rated with a score of  5/10 on the BEST day and a score of 9/10 on the WORST day.  Symptoms interfere with daily activity. The pain is exacerbated by movement.  The pain is mitigated by changing her positions and medications. Employment status:  Nurse, currently working in hospice care    Patient denies night fever/night sweats, urinary incontinence, bowel incontinence, significant weight loss, significant motor weakness, and loss of sensations.    Pain Disability Index Review:         9/19/2024     3:18 PM   Last 3 PDI Scores   Pain Disability Index (PDI) 35       Non-Pharmacologic Treatments:  Physical Therapy/Home Exercise: yes  Ice/Heat:yes  TENS: no  Acupuncture: no  Massage: yes  Chiropractic: no    Other: no      Pain Medications:  - Opioids:   Tramadol  - Adjuvant Medications:  Celebrex, Lunesta, fluoxetine, NSAIDs, Medrol Cory  - Anti-Coagulants:  None     report:  Reviewed and consistent with medication use as prescribed.    Pain Procedures:   Denies      Imaging:   CT chest abdomen pelvis 08/05/2024:   Chest: Stable spiculated opacity in the anterior left upper lobe with adjacent architectural distortion..  Medial bilateral lower lobe fibrosis likely radiation-induced more prominent on today's exam.  No new pulmonary nodule or mass.     No mediastinal or hilar adenopathy.  Heart is normal in size without pericardial effusion.  Noncalcified coronary arteries.  Osseous structures are intact.  Sclerotic bone lesions T6, T8 and T9 are stable.  No pathologic fracture.  No new metastasis.        Abdomen/pelvis: Liver and spleen unremarkable.  Stable left renal cyst.  No obstructive uropathy.  Adrenal glands and pancreas within normal limits.  Gallbladder surgically absent.     Diverticulosis of the sigmoid colon.  Nonobstructive bowel gas pattern.  No adenopathy.  No suspicious osseous lesions.  No free air free fluid.    MRI thoracic spine 07/15/2024:  Alignment: Within normal limits.     Vertebrae: Thoracic vertebral body heights are maintained.  Abnormal sclerotic appearing foci with STIR signal within the predominantly posterior aspects of the T6, T7, T8 and T9 vertebral bodies as demonstrated on prior CTs.  Multilevel endplate irregularity/degenerative change appears most pronounced at T9-T10 with minimal associated endplate edema.  No evidence of an acute fracture.     Discs: Disc desiccation throughout the thoracic spine.  Multilevel height loss is most pronounced at T9-T10.     Cord: No cord signal abnormality.     Enhancement: Faint associated enhancement of the involved T6, T7, T8 and T9 vertebral bodies.  There appears to be incomplete fat suppression of the post contrasted series involving the proximal thoracic spine from T1 through T4 limiting  evaluation for enhancement in that region.  No abnormal epidural or intrathecal enhancement.     Degenerative findings: Minor posterior thoracic disc bulges at T3-T4 and T4-T5.  Additional minor left paracentral T9-T10 disc bulge.  No ventral cord contact or spinal canal stenosis.  Mild multilevel facet arthropathy contributing to mild right-sided T9-T10 and mild left-sided T2-T3, T3-T4, T9-T10 and T10-T11 neural foraminal stenosis.  There are several perineural root sleeve cysts, the largest on the left at T7-T8.     Paraspinal muscles & soft tissues: Paraspinal soft tissues appear within normal limits.  Consolidative opacities in the lung as best demonstrated on prior CT.    Past Medical History:   Diagnosis Date    Malignant neoplasm of upper lobe of left lung 2023    Post-radiation pneumonitis 07/10/2024    Thyroid disease      Past Surgical History:   Procedure Laterality Date    CHOLECYSTECTOMY      ENDOBRONCHIAL ULTRASOUND Left 2023    Procedure: ENDOBRONCHIAL ULTRASOUND (EBUS);  Surgeon: Bam Catalan MD;  Location: [facility] ENDO;  Service: Pulmonary;  Laterality: Left;    HYSTERECTOMY      NAVIGATIONAL BRONCHOSCOPY Left 2023    Procedure: BRONCHOSCOPY, NAVIGATIONAL;  Surgeon: Bam Catalan MD;  Location: [facility] ENDO;  Service: Pulmonary;  Laterality: Left;     Social History     Socioeconomic History    Marital status:    Tobacco Use    Smoking status: Former     Current packs/day: 0.00     Types: Cigarettes     Quit date:      Years since quittin.7     Passive exposure: Never    Smokeless tobacco: Never   Substance and Sexual Activity    Alcohol use: Yes     Alcohol/week: 2.0 standard drinks of alcohol     Types: 2 Glasses of wine per week     Comment: socially    Drug use: Not Currently    Sexual activity: Not Currently   Social History Narrative    ** Merged History Encounter **          Social Determinants of Health     Financial Resource Strain: Low Risk  (2023)     Overall Financial Resource Strain (CARDIA)     Difficulty of Paying Living Expenses: Not hard at all   Food Insecurity: No Food Insecurity (12/25/2023)    Hunger Vital Sign     Worried About Running Out of Food in the Last Year: Never true     Ran Out of Food in the Last Year: Never true   Transportation Needs: No Transportation Needs (12/25/2023)    PRAPARE - Transportation     Lack of Transportation (Medical): No     Lack of Transportation (Non-Medical): No   Physical Activity: Sufficiently Active (12/25/2023)    Exercise Vital Sign     Days of Exercise per Week: 5 days     Minutes of Exercise per Session: 30 min   Stress: No Stress Concern Present (12/25/2023)    [employer] - Occupational Stress Questionnaire     Feeling of Stress : Not at all   Housing Stability: Low Risk  (12/25/2023)    Housing Stability Vital Sign     Unable to Pay for Housing in the Last Year: No     Number of Places Lived in the Last Year: 1     Unstable Housing in the Last Year: No     Family History   Problem Relation Name Age of Onset    Heart failure Mother Christi     Diabetes Mother Christi     Rheum arthritis Father Rayray     Diabetes Brother Giovanysay        Review of patient's allergies indicates:   Allergen Reactions    Xylocaine with epinephrine [lidocaine-epinephrine] Anaphylaxis    Lipitor [atorvastatin] Other (See Comments)     Body aches      Methocarbamol-aspirin     Zetia [ezetimibe] Other (See Comments)     Muscle spasms      Augmentin [amoxicillin-pot clavulanate] Rash    Macrodantin [nitrofurantoin macrocrystal] Rash    Omnicef [cefdinir] Rash and Other (See Comments)     GI upset    Pravastatin Rash    Sulfa (sulfonamide antibiotics) Rash       Current Outpatient Medications   Medication Sig    albuterol (VENTOLIN HFA) 90 mcg/actuation inhaler Inhale 2 puffs into the lungs every 6 (six) hours as needed for Wheezing. Rescue    albuterol-ipratropium (DUO-NEB) 2.5 mg-0.5 mg/3 mL nebulizer solution  Take 3 mLs by nebulization every 6 (six) hours as needed for Wheezing or Shortness of Breath. Rescue    azelastine (ASTELIN) 137 mcg (0.1 %) nasal spray 1 spray by Nasal route 2 (two) times daily.    budesonide-glycopyr-formoterol 160-9-4.8 mcg/actuation HFAA Inhale 2 puffs into the lungs 2 (two) times daily. Wash out mouth after use    celecoxib (CELEBREX) 100 MG capsule Take 1 capsule (100 mg total) by mouth once daily.    cetirizine (ZYRTEC) 10 MG tablet Take 5 mg by mouth once daily.    clindamycin phosphate 1% (CLINDAGEL) 1 % gel Apply topically 2 (two) times daily.    cycloSPORINE (RESTASIS) 0.05 % ophthalmic emulsion Place 1 drop into both eyes 2 (two) times a day.    doxycycline (VIBRAMYCIN) 100 MG Cap Take 1 capsule (100 mg total) by mouth 2 (two) times daily.    erlotinib (TARCEVA) 150 MG tablet Take 1 tablet (150 mg total) by mouth once daily.    eszopiclone (LUNESTA) 3 mg Tab Take 3 mg by mouth every evening.    famotidine (PEPCID) 20 MG tablet Take 1 tablet (20 mg total) by mouth 2 (two) times daily as needed for Heartburn.    fluticasone propionate (FLONASE) 50 mcg/actuation nasal spray 1 spray by Each Nostril route 2 (two) times daily.    hydrocortisone 2.5 % cream Apply topically 2 (two) times daily.    ipratropium (ATROVENT) 21 mcg (0.03 %) nasal spray SMARTSI Spray(s) Both Nares 2-3 Times Daily    levothyroxine (SYNTHROID) 88 MCG tablet Take 88 mcg by mouth every evening.    multivitamin capsule Take 1 capsule by mouth once daily.    mupirocin (BACTROBAN) 2 % ointment Apply topically 3 (three) times daily.    ondansetron (ZOFRAN-ODT) 4 MG TbDL Take 2 mg by mouth every 6 (six) hours as needed.    predniSONE (DELTASONE) 10 MG tablet Take 1 tablet (10 mg total) by mouth once daily.    promethazine (PHENERGAN) 25 MG tablet Take 25 mg by mouth every 6 (six) hours as needed.    traMADoL (ULTRAM) 50 mg tablet Take 1 tablet (50 mg total) by mouth every 6 (six) hours as needed for Pain.    benzonatate  "(TESSALON) 100 MG capsule Take 200 mg by mouth 2 (two) times daily as needed.    FLUoxetine 20 MG capsule Take 20 mg by mouth once daily.    fluticasone-salmeterol diskus inhaler 250-50 mcg Inhale 1 puff into the lungs 2 (two) times daily.    ibuprofen (ADVIL,MOTRIN) 200 MG tablet Take by mouth every 6 (six) hours as needed.    levothyroxine (SYNTHROID) 88 MCG tablet Take 88 mcg by mouth.    methylPREDNISolone (MEDROL DOSEPACK) 4 mg tablet Take by mouth.    predniSONE (DELTASONE) 10 MG tablet Take 2 tabs (20 mg) in the morning and 2 tabs (20 mg) in the evening from 3/25/24-3/31/24; take 2 tabs (20 mg) in the morning and 1 tab (10 mg) in the evening from 4/1/24-4/7/24; take 1 tab (10 mg) in the morning and 1 tab (10 mg) in the evening from 4/8/24-4/14/24; take 1 tab (10 mg) in the morning from 4/15/24-4/21/24).     No current facility-administered medications for this visit.       Review of Systems     GENERAL:  No weight loss, malaise or fevers.  HEENT:   No recent changes in vision or hearing  NECK:  Negative for lumps, no difficulty with swallowing.  RESPIRATORY:  Negative for cough, wheezing or shortness of breath, patient denies any recent URI.  CARDIOVASCULAR:  Negative for chest pain, leg swelling or palpitations.  GI:  Negative for abdominal discomfort, blood in stools or black stools or change in bowel habits.  MUSCULOSKELETAL:  See HPI.  SKIN:  Negative for lesions, rash, and itching.  PSYCH:  No mood disorder or recent psychosocial stressors.  Patients sleep is not disturbed secondary to pain.  HEMATOLOGY/LYMPHOLOGY:  Negative for prolonged bleeding, bruising easily or swollen nodes.  Patient is not currently taking any anti-coagulants  NEURO:   No history of headaches, syncope, paralysis, seizures or tremors.  All other reviewed and negative other than HPI.    OBJECTIVE:    /83   Pulse 87   Resp 16   Ht 5' 10" (1.778 m)   Wt 79.1 kg (174 lb 6.1 oz)   BMI 25.02 kg/m²         Physical " Exam    GENERAL: Well appearing, in no acute distress, alert and oriented x3.  PSYCH:  Mood and affect appropriate.  SKIN: Skin color, texture, turgor normal, no rashes or lesions.  HEAD/FACE:  Normocephalic, atraumatic. Cranial nerves grossly intact.  NECK:  Mild pain to palpation over the cervical paraspinous muscles. Spurling negative to radicular pain. No pain with neck flexion, extension, or lateral flexion.   CV: RRR with palpation of the radial artery.  PULM: No evidence of respiratory difficulty, symmetric chest rise.  GI:  Soft and non-tender.  BACK:  Scarring from previous robotic thoracic surgery.  Tenderness palpation over the thoracic paraspinals, mostly on the left  Limited range of motion secondary to pain reproduction  EXTREMITIES: No deformities, edema, or skin discoloration. Good capillary refill.  MUSCULOSKELETAL: Shoulder provocative maneuvers are negative.     Bilateral upper and lower extremity strength is normal and symmetric.  No atrophy or tone abnormalities are noted.  NEURO: Bilateral upper and lower extremity coordination and muscle stretch reflexes are physiologic and symmetric.  Plantar response are downgoing. No clonus.  Negative Milad.  No loss of sensation is noted.  GAIT: normal.      LABS:  Lab Results   Component Value Date    WBC 8.02 07/22/2024    HGB 12.5 07/22/2024    HCT 39.6 07/22/2024    MCV 87 07/22/2024     07/22/2024       CMP  Sodium   Date Value Ref Range Status   07/22/2024 143 136 - 145 mmol/L Final     Potassium   Date Value Ref Range Status   07/22/2024 4.0 3.5 - 5.1 mmol/L Final     Chloride   Date Value Ref Range Status   07/22/2024 110 95 - 110 mmol/L Final     CO2   Date Value Ref Range Status   07/22/2024 22 (L) 23 - 29 mmol/L Final     Glucose   Date Value Ref Range Status   07/22/2024 86 70 - 110 mg/dL Final     BUN   Date Value Ref Range Status   07/22/2024 20 8 - 23 mg/dL Final     Creatinine   Date Value Ref Range Status   07/22/2024 1.0 0.5 -  1.4 mg/dL Final     Calcium   Date Value Ref Range Status   07/22/2024 9.3 8.7 - 10.5 mg/dL Final     Total Protein   Date Value Ref Range Status   07/22/2024 6.7 6.0 - 8.4 g/dL Final     Albumin   Date Value Ref Range Status   07/22/2024 3.8 3.5 - 5.2 g/dL Final     Total Bilirubin   Date Value Ref Range Status   07/22/2024 0.3 0.1 - 1.0 mg/dL Final     Comment:     For infants and newborns, interpretation of results should be based  on gestational age, weight and in agreement with clinical  observations.    Premature Infant recommended reference ranges:  Up to 24 hours.............<8.0 mg/dL  Up to 48 hours............<12.0 mg/dL  3-5 days..................<15.0 mg/dL  6-29 days.................<15.0 mg/dL       Alkaline Phosphatase   Date Value Ref Range Status   07/22/2024 102 55 - 135 U/L Final     AST   Date Value Ref Range Status   07/22/2024 30 10 - 40 U/L Final     ALT   Date Value Ref Range Status   07/22/2024 34 10 - 44 U/L Final     Anion Gap   Date Value Ref Range Status   07/22/2024 11 8 - 16 mmol/L Final       Lab Results   Component Value Date    HGBA1C 6.3 (H) 02/28/2024             ASSESSMENT: 63 y.o. year old female with midback pain, consistent with     1. Post-thoracotomy pain syndrome  Case Request-RAD/Other Procedure Area: Paravertebral block at T7-8      2. Metastasis to spinal column        3. Cancer related pain              PLAN:   - Interventions:  Scheduled for T7/8 paravertebral block on the left to target patient's pain . Explained the risks and benefits of the procedure in detail with the patient today in clinic along with alternative treatment options, and the patient elected to pursue the intervention at this time.      - Anticoagulation use:  None    - Medications: I have stressed the importance of physical activity and a home exercise plan to help with pain and improve health. and Patient can continue with medications for now since they are providing benefits, using them  appropriately, and without side effects.         - Therapy:  Advised patient continue with activities as tolerated    - Psychological:  Discussed coping mechanisms to help address chronic pain issues    - Labs:  Reviewed    - Imaging: Reviewed available imaging with patient and answered any questions they had regarding study.    - Consults/Referrals:  None at this time    - Records:  Reviewed/Obtain old records from outside physicians and imaging    - Follow up visit: return to clinic 4-6 weeks post procedure or as needed    - Counseled patient regarding the importance of activity modification and physical therapy    - This condition does not require this patient to take time off of work, and the primary goal of our Pain Management services is to improve the patient's functional capacity.    - Patient Questions: Answered all of the patient's questions regarding diagnosis, therapy, and treatment        The above plan and management options were discussed at length with patient. Patient is in agreement with the above and verbalized understanding.    I discussed the goals of interventional chronic pain management with the patient on today's visit.  I explained the utility of injections for diagnostic and therapeutic purposes.  We discussed a multimodal approach to pain including treating the patient's given worst pain at any given time.  We will use a systematic approach to addressing pain.  We will also adopt a multimodal approach that includes injections, adjuvant medications, physical therapy, at times psychiatry.  There may be a limited role for opioid use intermittently in the treatment of pain, more particularly for acute pain although no one approach can be used as a sole treatment modality.    I emphasized the importance of regular exercise, core strengthening and stretching, diet and weight loss as a cornerstone of long-term pain management.      Thad Mario MD  Interventional Pain Management  [facility]  [place]    Visit today included increased complexity associated with the care of the episodic problem of chronic pain which was addressed and continue to manage the longitudinal care of the patient due to the serious and/or complex managed problem(s) listed above.      Disclaimer:  This note was prepared using voice recognition system and is likely to have sound alike errors that may have been overlooked even after proof reading.  Please call me with any questions

## 2024-10-02 ENCOUNTER — PATIENT MESSAGE (OUTPATIENT)
Dept: PAIN MEDICINE | Facility: HOSPITAL | Age: 64
End: 2024-10-02
Payer: COMMERCIAL

## 2024-10-02 NOTE — PRE-PROCEDURE INSTRUCTIONS
Spoke with patient regarding procedure scheduled on 10.3    Arrival time 1230     Has patient been sick with fever or on antibiotics within the last 7 days? No     Does the patient have any open wounds, sores or rashes? No     Does the patient have any recent fractures? no     Has patient received a vaccination within the last 7 days? No     Received the COVID vaccination? yes     Has the patient stopped all medications as directed? na     Does patient have a pacemaker, defibrillator, or implantable stimulator? No     Does the patient have a ride to and from procedure and someone reliable to remain with patient?       Is the patient diabetic? yes     Does the patient have sleep apnea? Or use O2 at home? no     Is the patient receiving sedation? Yes      Is the patient instructed to remain NPO beginning at midnight the night before their procedure? yes     Procedure location confirmed with patient? Yes     Covid- Denies signs/symptoms. Instructed to notify PAT/MD if any changes.

## 2024-10-03 ENCOUNTER — HOSPITAL ENCOUNTER (OUTPATIENT)
Facility: HOSPITAL | Age: 64
Discharge: HOME | End: 2024-10-03
Attending: PHYSICAL MEDICINE & REHABILITATION | Admitting: PHYSICAL MEDICINE & REHABILITATION
Payer: COMMERCIAL

## 2024-10-03 VITALS
SYSTOLIC BLOOD PRESSURE: 139 MMHG | HEIGHT: 70 IN | WEIGHT: 174.06 LBS | HEART RATE: 74 BPM | DIASTOLIC BLOOD PRESSURE: 70 MMHG | RESPIRATION RATE: 16 BRPM | OXYGEN SATURATION: 98 % | BODY MASS INDEX: 24.92 KG/M2 | TEMPERATURE: 98 F

## 2024-10-03 DIAGNOSIS — G89.12 POST-THORACOTOMY PAIN: Primary | ICD-10-CM

## 2024-10-03 PROCEDURE — 64490 INJ PARAVERT F JNT C/T 1 LEV: CPT | Performed by: PHYSICAL MEDICINE & REHABILITATION

## 2024-10-03 PROCEDURE — 63600175 PHARM REV CODE 636 W HCPCS: Performed by: PHYSICAL MEDICINE & REHABILITATION

## 2024-10-03 PROCEDURE — 25500020 PHARM REV CODE 255: Performed by: PHYSICAL MEDICINE & REHABILITATION

## 2024-10-03 PROCEDURE — 64490 INJ PARAVERT F JNT C/T 1 LEV: CPT | Mod: LT,,, | Performed by: PHYSICAL MEDICINE & REHABILITATION

## 2024-10-03 RX ORDER — FENTANYL CITRATE 50 UG/ML
INJECTION, SOLUTION INTRAMUSCULAR; INTRAVENOUS
Status: DISCONTINUED | OUTPATIENT
Start: 2024-10-03 | End: 2024-10-03 | Stop reason: HOSPADM

## 2024-10-03 RX ORDER — BUPIVACAINE HYDROCHLORIDE 2.5 MG/ML
INJECTION, SOLUTION EPIDURAL; INFILTRATION; INTRACAUDAL
Status: DISCONTINUED | OUTPATIENT
Start: 2024-10-03 | End: 2024-10-03 | Stop reason: HOSPADM

## 2024-10-03 RX ORDER — DEXAMETHASONE SODIUM PHOSPHATE 10 MG/ML
INJECTION INTRAMUSCULAR; INTRAVENOUS
Status: DISCONTINUED | OUTPATIENT
Start: 2024-10-03 | End: 2024-10-03 | Stop reason: HOSPADM

## 2024-10-03 RX ORDER — MIDAZOLAM HYDROCHLORIDE 1 MG/ML
INJECTION INTRAMUSCULAR; INTRAVENOUS
Status: DISCONTINUED | OUTPATIENT
Start: 2024-10-03 | End: 2024-10-03 | Stop reason: HOSPADM

## 2024-10-03 RX ORDER — ONDANSETRON HYDROCHLORIDE 2 MG/ML
4 INJECTION, SOLUTION INTRAVENOUS ONCE AS NEEDED
Status: DISCONTINUED | OUTPATIENT
Start: 2024-10-03 | End: 2024-10-03 | Stop reason: HOSPADM

## 2024-10-03 NOTE — OP NOTE
Date of Procedure: 10/03/2024    Procedure: Left T7/8 paravertebral block using fluoroscopic guidance    Pre-op diagnosis: Post-thoracotomy pain syndrome [G89.12]     Post-op diagnosis: Post-thoracotomy pain syndrome [G89.12]     Physician: Thad Mario MD     Assistant:None    Anesthestia: local    EBL: None    Specimens: None    All medications, allergies, and relevant histories were reviewed. No recent antibiotics or infections.  A time-out was taken to verify the correct patient, procedure, laterality, and appropriate medications/allergies.    Paravertberal Block:   The procedure and possible complications of pneumothorax, bleeding, infection, and nerve damage were fully explained to the patient. NIBP, O2 saturation, and EKG were monitored continuously.       Patient was placed in the prone position with blanket under the chest and both arms by the side above the head. Left posterior chest wall was prepped with CHG x 3 and draped. Sterile precautions observed throughout the procedure. Using fluoroscopic guidance the left T7 transverse process was identified and marked to the left of midline.  Xylocaine 1% was infiltrated locally over the rib. A 22-gauge spinal needle was introduced at a slight cephalad angle and the transverse process was encountered. Needle was walked off inferiorly under the transverse process and advanced 1.25 cm. 3 cc of a mixture of 9mL bupivacaine 0.25% with dexamethasone 10 mg was injected. This was flushed in with 3 cc of normal saline.  The needle was removed and a sterile bandage applied.     The patient was monitored after the procedure. Patient tolerated the procedure well without any complications.  No shortness of breath. O2 saturation maintained. Patient was discharged in the company of a responsible adult.      Future Management:   If helpful, can repeat as needed.    Follow up with my clinic in 4 weeks or sooner if needed

## 2024-10-03 NOTE — H&P
HPI  Patient presenting for Procedure(s) (LRB):  Paravertebral block at T7-8 (Left)     No health changes since previous encounter    Past Medical History:   Diagnosis Date    Malignant neoplasm of upper lobe of left lung 01/19/2023    Post-radiation pneumonitis 07/10/2024    Thyroid disease      Past Surgical History:   Procedure Laterality Date    CHOLECYSTECTOMY      ENDOBRONCHIAL ULTRASOUND Left 1/5/2023    Procedure: ENDOBRONCHIAL ULTRASOUND (EBUS);  Surgeon: Bam Catalan MD;  Location: [facility] ENDO;  Service: Pulmonary;  Laterality: Left;    HYSTERECTOMY      NAVIGATIONAL BRONCHOSCOPY Left 1/5/2023    Procedure: BRONCHOSCOPY, NAVIGATIONAL;  Surgeon: Bam Catalan MD;  Location: [facility];  Service: Pulmonary;  Laterality: Left;     Review of patient's allergies indicates:   Allergen Reactions    Xylocaine with epinephrine [lidocaine-epinephrine] Anaphylaxis    Lipitor [atorvastatin] Other (See Comments)     Body aches      Methocarbamol-aspirin     Zetia [ezetimibe] Other (See Comments)     Muscle spasms      Augmentin [amoxicillin-pot clavulanate] Rash    Macrodantin [nitrofurantoin macrocrystal] Rash    Omnicef [cefdinir] Rash and Other (See Comments)     GI upset    Pravastatin Rash    Sulfa (sulfonamide antibiotics) Rash        No current facility-administered medications on file prior to encounter.     Current Outpatient Medications on File Prior to Encounter   Medication Sig Dispense Refill    celecoxib (CELEBREX) 100 MG capsule Take 1 capsule (100 mg total) by mouth once daily. 30 capsule 2    cetirizine (ZYRTEC) 10 MG tablet Take 5 mg by mouth once daily.      erlotinib (TARCEVA) 150 MG tablet Take 1 tablet (150 mg total) by mouth once daily. 30 tablet 11    levothyroxine (SYNTHROID) 88 MCG tablet Take 88 mcg by mouth every evening.      multivitamin capsule Take 1 capsule by mouth once daily.      albuterol (VENTOLIN HFA) 90 mcg/actuation inhaler Inhale 2 puffs into the lungs every 6 (six) hours  as needed for Wheezing. Rescue 18 g 3    albuterol-ipratropium (DUO-NEB) 2.5 mg-0.5 mg/3 mL nebulizer solution Take 3 mLs by nebulization every 6 (six) hours as needed for Wheezing or Shortness of Breath. Rescue 300 mL 11    azelastine (ASTELIN) 137 mcg (0.1 %) nasal spray 1 spray by Nasal route 2 (two) times daily.      budesonide-glycopyr-formoterol 160-9-4.8 mcg/actuation HFAA Inhale 2 puffs into the lungs 2 (two) times daily. Wash out mouth after use 10.7 g 11    clindamycin phosphate 1% (CLINDAGEL) 1 % gel Apply topically 2 (two) times daily. 60 g 0    cycloSPORINE (RESTASIS) 0.05 % ophthalmic emulsion Place 1 drop into both eyes 2 (two) times a day.      doxycycline (VIBRAMYCIN) 100 MG Cap Take 1 capsule (100 mg total) by mouth 2 (two) times daily. 60 capsule 1    eszopiclone (LUNESTA) 3 mg Tab Take 3 mg by mouth every evening.      famotidine (PEPCID) 20 MG tablet Take 1 tablet (20 mg total) by mouth 2 (two) times daily as needed for Heartburn. 60 tablet 1    fluticasone propionate (FLONASE) 50 mcg/actuation nasal spray 1 spray by Each Nostril route 2 (two) times daily.      hydrocortisone 2.5 % cream Apply topically 2 (two) times daily. 453.6 g 0    ipratropium (ATROVENT) 21 mcg (0.03 %) nasal spray SMARTSI Spray(s) Both Nares 2-3 Times Daily      mupirocin (BACTROBAN) 2 % ointment Apply topically 3 (three) times daily.      ondansetron (ZOFRAN-ODT) 4 MG TbDL Take 2 mg by mouth every 6 (six) hours as needed.      predniSONE (DELTASONE) 10 MG tablet Take 1 tablet (10 mg total) by mouth once daily. 30 tablet 1    promethazine (PHENERGAN) 25 MG tablet Take 25 mg by mouth every 6 (six) hours as needed.      traMADoL (ULTRAM) 50 mg tablet Take 1 tablet (50 mg total) by mouth every 6 (six) hours as needed for Pain. 45 tablet 0        PMHx, PSHx, Allergies, Medications reviewed in epic    ROS negative except pain complaints in HPI    OBJECTIVE:    BP (!) 156/72 (BP Location: Right arm, Patient Position: Sitting)  "  Pulse 80   Temp 97.6 °F (36.4 °C) (Temporal)   Resp 16   Ht 5' 10" (1.778 m)   Wt 78.9 kg (174 lb 0.9 oz)   SpO2 96%   Breastfeeding No   BMI 24.97 kg/m²     PHYSICAL EXAMINATION:    GENERAL: Well appearing, in no acute distress, alert and oriented x3.  PSYCH:  Mood and affect appropriate.  SKIN: Skin color, texture, turgor normal, no rashes or lesions which will impact the procedure.  CV: RRR with palpation of the radial artery.  PULM: No evidence of respiratory difficulty, symmetric chest rise. Clear to auscultation.  NEURO: Cranial nerves grossly intact.    Plan:    Proceed with procedure as planned Procedure(s) (LRB):  Paravertebral block at T7-8 (Left)    Thad Mario MD  10/03/2024            "

## 2024-10-03 NOTE — DISCHARGE INSTRUCTIONS

## 2024-10-03 NOTE — DISCHARGE SUMMARY
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  Qty: 30 capsule, Refills: 2    Associated Diagnoses: Back pain with right-sided radiculopathy      cetirizine (ZYRTEC) 10 MG tablet Take 5 mg by mouth once daily.      erlotinib (TARCEVA) 150 MG tablet Take 1 tablet (150 mg total) by mouth once daily.  Qty: 30 tablet, Refills: 11    Associated Diagnoses: Adenocarcinoma of upper lobe of left lung      levothyroxine (SYNTHROID) 88 MCG tablet Take 88 mcg by mouth every evening.      multivitamin capsule Take 1 capsule by mouth once daily.      albuterol (VENTOLIN HFA) 90 mcg/actuation inhaler Inhale 2 puffs into the lungs every 6 (six) hours as needed for Wheezing. Rescue  Qty: 18 g, Refills: 3    Associated Diagnoses: Chronic bronchitis, mucopurulent      albuterol-ipratropium (DUO-NEB) 2.5 mg-0.5 mg/3 mL nebulizer solution Take 3 mLs by nebulization every 6 (six) hours as needed for Wheezing or Shortness of Breath. Rescue  Qty: 300 mL, Refills: 11    Associated Diagnoses: Chronic bronchitis, mucopurulent; Simple chronic bronchitis      azelastine (ASTELIN) 137 mcg (0.1 %) nasal spray 1 spray by Nasal route 2 (two) times daily.      budesonide-glycopyr-formoterol 160-9-4.8 mcg/actuation HFAA Inhale 2 puffs into the lungs 2 (two) times daily. Wash out mouth after use  Qty: 10.7 g, Refills: 11    Associated Diagnoses: Chronic bronchitis, mucopurulent; Simple chronic bronchitis      clindamycin phosphate 1%  (CLINDAGEL) 1 % gel Apply topically 2 (two) times daily.  Qty: 60 g, Refills: 0    Associated Diagnoses: Acneiform rash      cycloSPORINE (RESTASIS) 0.05 % ophthalmic emulsion Place 1 drop into both eyes 2 (two) times a day.      doxycycline (VIBRAMYCIN) 100 MG Cap Take 1 capsule (100 mg total) by mouth 2 (two) times daily.  Qty: 60 capsule, Refills: 1    Associated Diagnoses: Adenocarcinoma of upper lobe of left lung      eszopiclone (LUNESTA) 3 mg Tab Take 3 mg by mouth every evening.      famotidine (PEPCID) 20 MG tablet Take 1 tablet (20 mg total) by mouth 2 (two) times daily as needed for Heartburn.  Qty: 60 tablet, Refills: 1    Associated Diagnoses: Gastroesophageal reflux disease, unspecified whether esophagitis present      fluticasone propionate (FLONASE) 50 mcg/actuation nasal spray 1 spray by Each Nostril route 2 (two) times daily.      hydrocortisone 2.5 % cream Apply topically 2 (two) times daily.  Qty: 453.6 g, Refills: 0    Associated Diagnoses: Acneiform rash      ipratropium (ATROVENT) 21 mcg (0.03 %) nasal spray SMARTSI Spray(s) Both Nares 2-3 Times Daily      mupirocin (BACTROBAN) 2 % ointment Apply topically 3 (three) times daily.      ondansetron (ZOFRAN-ODT) 4 MG TbDL Take 2 mg by mouth every 6 (six) hours as needed.      predniSONE (DELTASONE) 10 MG tablet Take 1 tablet (10 mg total) by mouth once daily.  Qty: 30 tablet, Refills: 1    Associated Diagnoses: Radiation pneumonitis      promethazine (PHENERGAN) 25 MG tablet Take 25 mg by mouth every 6 (six) hours as needed.      traMADoL (ULTRAM) 50 mg tablet Take 1 tablet (50 mg total) by mouth every 6 (six) hours as needed for Pain.  Qty: 45 tablet, Refills: 0    Comments: Quantity prescribed more than 7 day supply? 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  Activity: as per instruction sheet.  Discharge to: Home with a responsible adult.  Follow up: 2-4 weeks       Please call the office at [phone] if you experience any weakness or loss of sensation, fever > 101.5, pain uncontrolled with oral medications, persistent nausea/vomiting/or diarrhea, redness or drainage from the incisions, or any other worrisome concerns. If physician on call was not reached or could not communicate with our office for any reason please go to the nearest emergency department

## 2024-11-01 ENCOUNTER — TELEPHONE (OUTPATIENT)
Dept: PAIN MEDICINE | Facility: CLINIC | Age: 64
End: 2024-11-01
Payer: COMMERCIAL

## 2024-11-04 ENCOUNTER — TELEPHONE (OUTPATIENT)
Dept: PAIN MEDICINE | Facility: CLINIC | Age: 64
End: 2024-11-04
Payer: COMMERCIAL

## 2024-11-04 ENCOUNTER — OFFICE VISIT (OUTPATIENT)
Dept: PAIN MEDICINE | Facility: CLINIC | Age: 64
End: 2024-11-04
Payer: COMMERCIAL

## 2024-11-04 DIAGNOSIS — G89.3 CANCER RELATED PAIN: ICD-10-CM

## 2024-11-04 DIAGNOSIS — G89.12 POST-THORACOTOMY PAIN: Primary | ICD-10-CM

## 2024-11-04 DIAGNOSIS — C79.51 METASTASIS TO SPINAL COLUMN: ICD-10-CM

## 2024-11-04 DIAGNOSIS — G89.12 POST-THORACOTOMY PAIN SYNDROME: ICD-10-CM

## 2024-11-04 PROCEDURE — 4010F ACE/ARB THERAPY RXD/TAKEN: CPT | Mod: CPTII,95,, | Performed by: PHYSICAL MEDICINE & REHABILITATION

## 2024-11-04 PROCEDURE — 3044F HG A1C LEVEL LT 7.0%: CPT | Mod: CPTII,95,, | Performed by: PHYSICAL MEDICINE & REHABILITATION

## 2024-11-04 PROCEDURE — 99213 OFFICE O/P EST LOW 20 MIN: CPT | Mod: 95,,, | Performed by: PHYSICAL MEDICINE & REHABILITATION

## 2024-11-04 NOTE — TELEPHONE ENCOUNTER
Reached out to patient to schedule appointment from messages. Apt has been made.   Pt understand. All questions answered.     Zaki Christie  Medical Assistant

## 2024-11-04 NOTE — TELEPHONE ENCOUNTER
----- Message from Thad Mario MD sent at 11/4/2024  2:46 PM CST -----  Please assist with scheduling 3-4 month follow-up with RE[facility]

## 2024-11-04 NOTE — PROGRESS NOTES
Chronic Pain-Tele-Medicine-Established Note (Follow up visit)    The patient location is:  [place]  The chief complaint leading to consultation is:  Follow up injection  Visit type: Virtual visit with synchronous audio and video  Total time spent with patient:  5-10 minutes  Each patient to whom he or she provides medical services by telemedicine is: (1) informed of the relationship between the physician and patient and the respective role of any other health care provider with respect to management of the patient; and (2) notified that he or she may decline to receive medical services by telemedicine and may withdraw from such care at any time.    Notes:    Follow up injection      SUBJECTIVE:    JACOBO COLEMAN is a 63 y.o. female who presents today for follow-up injection after receiving a left-sided paravertebral block at T7-8 on 10/03/2024.  She reports 95% relief with this injection and has sustained relief at this time.  She was very pleased with the results of this injection in his requiring minimal amounts of analgesics for her off and on pain.  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    Patient denies night fever/night sweats, urinary incontinence, bowel incontinence, significant weight loss, significant motor weakness, and loss of sensations.    Pain Disability Index Review:      9/19/2024     3:18 PM   Last 3 PDI Scores   Pain Disability Index (PDI) 35     Initial HPI 09/19/2024:  JACOBO COLEMAN is a 63 y.o. female who presents to the clinic for the evaluation of back and leg pain.  She was referred by her primary care provider for further evaluation and management of this pain.  She has past medical history of anxiety, lung cancer s/p chemotherapy and radiation, hypercholesterolemia, immunodeficiency, DM2, hypothyroidism, and multiple other medical comorbidities as listed  in her chart.  The pain started about 2 years ago following metastatic spread to or thoracic spine her primary lung cancer and also subsequent vats procedure and symptoms have been unchanged.The pain is located in the left midthoracic area and radiates to the left anterior and lateral chest wall.  The pain is described as sharp, stabbing, aching, dull and is rated as 5/10. The pain is rated with a score of  5/10 on the BEST day and a score of 9/10 on the WORST day.  Symptoms interfere with daily activity. The pain is exacerbated by movement.  The pain is mitigated by changing her positions and medications. Employment status:  Nurse, currently working in hospice care      Non-Pharmacologic Treatments:  Physical Therapy/Home Exercise: yes  Ice/Heat:yes  TENS: no  Acupuncture: no  Massage: yes  Chiropractic: no    Other: no      Pain Medications:  - Opioids:  Tramadol  - Adjuvant Medications:  Celebrex, Lunesta, fluoxetine, NSAIDs, Medrol Cory  - Anti-Coagulants:  None     report:  Reviewed and consistent with medication use as prescribed.    Pain Procedures:   -10/03/2024:  Left T7/8 paravertebral block, 95% relief      Imaging:   CT chest abdomen pelvis 08/05/2024:   Chest: Stable spiculated opacity in the anterior left upper lobe with adjacent architectural distortion..  Medial bilateral lower lobe fibrosis likely radiation-induced more prominent on today's exam.  No new pulmonary nodule or mass.     No mediastinal or hilar adenopathy.  Heart is normal in size without pericardial effusion.  Noncalcified coronary arteries.  Osseous structures are intact.  Sclerotic bone lesions T6, T8 and T9 are stable.  No pathologic fracture.  No new metastasis.        Abdomen/pelvis: Liver and spleen unremarkable.  Stable left renal cyst.  No obstructive uropathy.  Adrenal glands and pancreas within normal limits.  Gallbladder surgically absent.     Diverticulosis of the sigmoid colon.  Nonobstructive bowel gas pattern.  No  adenopathy.  No suspicious osseous lesions.  No free air free fluid.    MRI thoracic spine 07/15/2024:  Alignment: Within normal limits.     Vertebrae: Thoracic vertebral body heights are maintained.  Abnormal sclerotic appearing foci with STIR signal within the predominantly posterior aspects of the T6, T7, T8 and T9 vertebral bodies as demonstrated on prior CTs.  Multilevel endplate irregularity/degenerative change appears most pronounced at T9-T10 with minimal associated endplate edema.  No evidence of an acute fracture.     Discs: Disc desiccation throughout the thoracic spine.  Multilevel height loss is most pronounced at T9-T10.     Cord: No cord signal abnormality.     Enhancement: Faint associated enhancement of the involved T6, T7, T8 and T9 vertebral bodies.  There appears to be incomplete fat suppression of the post contrasted series involving the proximal thoracic spine from T1 through T4 limiting evaluation for enhancement in that region.  No abnormal epidural or intrathecal enhancement.     Degenerative findings: Minor posterior thoracic disc bulges at T3-T4 and T4-T5.  Additional minor left paracentral T9-T10 disc bulge.  No ventral cord contact or spinal canal stenosis.  Mild multilevel facet arthropathy contributing to mild right-sided T9-T10 and mild left-sided T2-T3, T3-T4, T9-T10 and T10-T11 neural foraminal stenosis.  There are several perineural root sleeve cysts, the largest on the left at T7-T8.     Paraspinal muscles & soft tissues: Paraspinal soft tissues appear within normal limits.  Consolidative opacities in the lung as best demonstrated on prior CT.        PMHx,PSHx, Social history, and Family history:  I have reviewed the patient's medical, surgical, social, and family history in detail and updated the computerized patient record.          Review of patient's allergies indicates:   Allergen Reactions    Xylocaine with epinephrine [lidocaine-epinephrine] Anaphylaxis    Lipitor  [atorvastatin] Other (See Comments)     Body aches      Methocarbamol-aspirin     Zetia [ezetimibe] Other (See Comments)     Muscle spasms      Augmentin [amoxicillin-pot clavulanate] Rash    Macrodantin [nitrofurantoin macrocrystal] Rash    Omnicef [cefdinir] Rash and Other (See Comments)     GI upset    Pravastatin Rash    Sulfa (sulfonamide antibiotics) Rash       Current Outpatient Medications   Medication Sig    albuterol (VENTOLIN HFA) 90 mcg/actuation inhaler Inhale 2 puffs into the lungs every 6 (six) hours as needed for Wheezing. Rescue    albuterol-ipratropium (DUO-NEB) 2.5 mg-0.5 mg/3 mL nebulizer solution Take 3 mLs by nebulization every 6 (six) hours as needed for Wheezing or Shortness of Breath. Rescue    azelastine (ASTELIN) 137 mcg (0.1 %) nasal spray 1 spray by Nasal route 2 (two) times daily.    budesonide-glycopyr-formoterol 160-9-4.8 mcg/actuation HFAA Inhale 2 puffs into the lungs 2 (two) times daily. Wash out mouth after use    celecoxib (CELEBREX) 100 MG capsule Take 1 capsule (100 mg total) by mouth once daily.    cetirizine (ZYRTEC) 10 MG tablet Take 5 mg by mouth once daily.    clindamycin phosphate 1% (CLINDAGEL) 1 % gel Apply topically 2 (two) times daily.    cycloSPORINE (RESTASIS) 0.05 % ophthalmic emulsion Place 1 drop into both eyes 2 (two) times a day.    doxycycline (VIBRAMYCIN) 100 MG Cap Take 1 capsule (100 mg total) by mouth 2 (two) times daily.    erlotinib (TARCEVA) 150 MG tablet Take 1 tablet (150 mg total) by mouth once daily.    eszopiclone (LUNESTA) 3 mg Tab Take 3 mg by mouth every evening.    famotidine (PEPCID) 20 MG tablet Take 1 tablet (20 mg total) by mouth 2 (two) times daily as needed for Heartburn.    fluticasone propionate (FLONASE) 50 mcg/actuation nasal spray 1 spray by Each Nostril route 2 (two) times daily.    hydrocortisone 2.5 % cream Apply topically 2 (two) times daily.    ipratropium (ATROVENT) 21 mcg (0.03 %) nasal spray SMARTSI Spray(s) Both Nares 2-3  Times Daily    levothyroxine (SYNTHROID) 88 MCG tablet Take 88 mcg by mouth every evening.    multivitamin capsule Take 1 capsule by mouth once daily.    mupirocin (BACTROBAN) 2 % ointment Apply topically 3 (three) times daily.    ondansetron (ZOFRAN-ODT) 4 MG TbDL Take 2 mg by mouth every 6 (six) hours as needed.    predniSONE (DELTASONE) 10 MG tablet Take 1 tablet (10 mg total) by mouth once daily.    promethazine (PHENERGAN) 25 MG tablet Take 25 mg by mouth every 6 (six) hours as needed.    traMADoL (ULTRAM) 50 mg tablet Take 1 tablet (50 mg total) by mouth every 6 (six) hours as needed for Pain.     No current facility-administered medications for this visit.       Review of Systems     GENERAL:  No weight loss, malaise or fevers.  HEENT:   No recent changes in vision or hearing  NECK:  Negative for lumps, no difficulty with swallowing.  RESPIRATORY:  Negative for cough, wheezing or shortness of breath, patient denies any recent URI.  CARDIOVASCULAR:  Negative for chest pain, leg swelling or palpitations.  GI:  Negative for abdominal discomfort, blood in stools or black stools or change in bowel habits.  MUSCULOSKELETAL:  See HPI.  SKIN:  Negative for lesions, rash, and itching.  PSYCH:  No mood disorder or recent psychosocial stressors.  Patients sleep is not disturbed secondary to pain.  HEMATOLOGY/LYMPHOLOGY:  Negative for prolonged bleeding, bruising easily or swollen nodes.  Patient is not currently taking any anti-coagulants  NEURO:   No history of headaches, syncope, paralysis, seizures or tremors.  All other reviewed and negative other than HPI.    OBJECTIVE:        Physical Exam    Physical exam:  GENERAL: Well appearing, in no acute distress, alert and oriented x3.    PSYCH:  Mood and affect appropriate.  SKIN: Skin color, texture, turgor normal, no rashes or lesions.  HEAD/FACE:  Normocephalic, atraumatic. Cranial nerves grossly intact.  CV:  No peripheral edema noted  PULM:  No difficulty breathing              LABS:  Lab Results   Component Value Date    WBC 8.02 07/22/2024    HGB 12.5 07/22/2024    HCT 39.6 07/22/2024    MCV 87 07/22/2024     07/22/2024       CMP  Sodium   Date Value Ref Range Status   07/22/2024 143 136 - 145 mmol/L Final     Potassium   Date Value Ref Range Status   07/22/2024 4.0 3.5 - 5.1 mmol/L Final     Chloride   Date Value Ref Range Status   07/22/2024 110 95 - 110 mmol/L Final     CO2   Date Value Ref Range Status   07/22/2024 22 (L) 23 - 29 mmol/L Final     Glucose   Date Value Ref Range Status   07/22/2024 86 70 - 110 mg/dL Final     BUN   Date Value Ref Range Status   07/22/2024 20 8 - 23 mg/dL Final     Creatinine   Date Value Ref Range Status   07/22/2024 1.0 0.5 - 1.4 mg/dL Final     Calcium   Date Value Ref Range Status   07/22/2024 9.3 8.7 - 10.5 mg/dL Final     Total Protein   Date Value Ref Range Status   07/22/2024 6.7 6.0 - 8.4 g/dL Final     Albumin   Date Value Ref Range Status   07/22/2024 3.8 3.5 - 5.2 g/dL Final     Total Bilirubin   Date Value Ref Range Status   07/22/2024 0.3 0.1 - 1.0 mg/dL Final     Comment:     For infants and newborns, interpretation of results should be based  on gestational age, weight and in agreement with clinical  observations.    Premature Infant recommended reference ranges:  Up to 24 hours.............<8.0 mg/dL  Up to 48 hours............<12.0 mg/dL  3-5 days..................<15.0 mg/dL  6-29 days.................<15.0 mg/dL       Alkaline Phosphatase   Date Value Ref Range Status   07/22/2024 102 55 - 135 U/L Final     AST   Date Value Ref Range Status   07/22/2024 30 10 - 40 U/L Final     ALT   Date Value Ref Range Status   07/22/2024 34 10 - 44 U/L Final     Anion Gap   Date Value Ref Range Status   07/22/2024 11 8 - 16 mmol/L Final       Lab Results   Component Value Date    HGBA1C 6.3 (H) 02/28/2024             ASSESSMENT: 63 y.o. year old female with midback pain, consistent with     1. Post-thoracotomy pain        2.  Post-thoracotomy pain syndrome        3. Metastasis to spinal column        4. Cancer related pain                PLAN:   - Interventions:  S/p left-sided paravertebral block at T7/8 on 10/03/2024 with 95% relief    - Anticoagulation use:  None    - Medications: I have stressed the importance of physical activity and a home exercise plan to help with pain and improve health. and Patient can continue with medications for now since they are providing benefits, using them appropriately, and without side effects.         - Therapy:  Advised patient continue with activities as tolerated    - Psychological:  Discussed coping mechanisms to help address chronic pain issues    - Labs:  Reviewed    - Imaging: Reviewed available imaging with patient and answered any questions they had regarding study.    - Consults/Referrals:  None at this time    - Records:  Reviewed/Obtain old records from outside physicians and imaging    - Follow up visit: return to clinic 3-4 months or as needed    - Counseled patient regarding the importance of activity modification and physical therapy    - This condition does not require this patient to take time off of work, and the primary goal of our Pain Management services is to improve the patient's functional capacity.    - Patient Questions: Answered all of the patient's questions regarding diagnosis, therapy, and treatment        The above plan and management options were discussed at length with patient. Patient is in agreement with the above and verbalized understanding.      Thad Mario MD  Interventional Pain Management  [facility]    Visit today included increased complexity associated with the care of the episodic problem of chronic pain which was addressed and continue to manage the longitudinal care of the patient due to the serious and/or complex managed problem(s) listed above.      Disclaimer:  This note was prepared using voice recognition system and is likely to  have sound alike errors that may have been overlooked even after proof reading.  Please call me with any questions

## 2024-11-08 ENCOUNTER — HOSPITAL ENCOUNTER (OUTPATIENT)
Dept: RADIOLOGY | Facility: HOSPITAL | Age: 64
Discharge: HOME | End: 2024-11-08
Attending: HOSPITALIST
Payer: COMMERCIAL

## 2024-11-08 DIAGNOSIS — C34.12 ADENOCARCINOMA OF UPPER LOBE OF LEFT LUNG: ICD-10-CM

## 2024-11-08 PROCEDURE — 74177 CT ABD & PELVIS W/CONTRAST: CPT | Mod: TC

## 2024-11-08 PROCEDURE — 74177 CT ABD & PELVIS W/CONTRAST: CPT | Mod: 26,,, | Performed by: STUDENT IN AN ORGANIZED HEALTH CARE EDUCATION/TRAINING PROGRAM

## 2024-11-08 PROCEDURE — 71260 CT THORAX DX C+: CPT | Mod: 26,,, | Performed by: STUDENT IN AN ORGANIZED HEALTH CARE EDUCATION/TRAINING PROGRAM

## 2024-11-08 PROCEDURE — 25500020 PHARM REV CODE 255: Performed by: HOSPITALIST

## 2024-11-08 RX ADMIN — IOHEXOL 75 ML: 350 INJECTION, SOLUTION INTRAVENOUS at 02:11

## 2024-11-08 RX ADMIN — IOHEXOL 30 ML: 350 INJECTION, SOLUTION INTRAVENOUS at 02:11

## 2024-11-09 ENCOUNTER — PATIENT MESSAGE (OUTPATIENT)
Dept: ADMINISTRATIVE | Facility: OTHER | Age: 64
End: 2024-11-09
Payer: COMMERCIAL

## 2024-11-11 ENCOUNTER — DOCUMENTATION ONLY (OUTPATIENT)
Dept: HEMATOLOGY/ONCOLOGY | Facility: CLINIC | Age: 64
End: 2024-11-11
Payer: COMMERCIAL

## 2024-11-11 ENCOUNTER — PATIENT MESSAGE (OUTPATIENT)
Dept: HEMATOLOGY/ONCOLOGY | Facility: CLINIC | Age: 64
End: 2024-11-11

## 2024-11-11 ENCOUNTER — OFFICE VISIT (OUTPATIENT)
Dept: HEMATOLOGY/ONCOLOGY | Facility: CLINIC | Age: 64
End: 2024-11-11
Payer: COMMERCIAL

## 2024-11-11 VITALS
OXYGEN SATURATION: 98 % | SYSTOLIC BLOOD PRESSURE: 123 MMHG | BODY MASS INDEX: 24.84 KG/M2 | TEMPERATURE: 97 F | HEART RATE: 82 BPM | HEIGHT: 70 IN | WEIGHT: 173.5 LBS | DIASTOLIC BLOOD PRESSURE: 79 MMHG

## 2024-11-11 DIAGNOSIS — M54.10 BACK PAIN WITH LEFT-SIDED RADICULOPATHY: ICD-10-CM

## 2024-11-11 DIAGNOSIS — L70.8 ACNEIFORM RASH: ICD-10-CM

## 2024-11-11 DIAGNOSIS — J70.0 RADIATION PNEUMONITIS: ICD-10-CM

## 2024-11-11 DIAGNOSIS — C79.51 SECONDARY MALIGNANT NEOPLASM OF BONE: ICD-10-CM

## 2024-11-11 DIAGNOSIS — R19.7 DIARRHEA, UNSPECIFIED TYPE: ICD-10-CM

## 2024-11-11 DIAGNOSIS — C34.12 ADENOCARCINOMA OF UPPER LOBE OF LEFT LUNG: Primary | ICD-10-CM

## 2024-11-11 DIAGNOSIS — L03.012 PARONYCHIA OF FINGER OF LEFT HAND: ICD-10-CM

## 2024-11-11 PROCEDURE — 3074F SYST BP LT 130 MM HG: CPT | Mod: CPTII,S$GLB,, | Performed by: HOSPITALIST

## 2024-11-11 PROCEDURE — 99215 OFFICE O/P EST HI 40 MIN: CPT | Mod: S$GLB,,, | Performed by: HOSPITALIST

## 2024-11-11 PROCEDURE — 3044F HG A1C LEVEL LT 7.0%: CPT | Mod: CPTII,S$GLB,, | Performed by: HOSPITALIST

## 2024-11-11 PROCEDURE — 99999 PR PBB SHADOW E&M-EST. PATIENT-LVL III: CPT | Mod: PBBFAC,,, | Performed by: HOSPITALIST

## 2024-11-11 PROCEDURE — 3008F BODY MASS INDEX DOCD: CPT | Mod: CPTII,S$GLB,, | Performed by: HOSPITALIST

## 2024-11-11 PROCEDURE — G2211 COMPLEX E/M VISIT ADD ON: HCPCS | Mod: S$GLB,,, | Performed by: HOSPITALIST

## 2024-11-11 PROCEDURE — 4010F ACE/ARB THERAPY RXD/TAKEN: CPT | Mod: CPTII,S$GLB,, | Performed by: HOSPITALIST

## 2024-11-11 PROCEDURE — 3078F DIAST BP <80 MM HG: CPT | Mod: CPTII,S$GLB,, | Performed by: HOSPITALIST

## 2024-11-11 RX ORDER — DOXEPIN HYDROCHLORIDE 100 MG/1
CAPSULE ORAL
COMMUNITY
Start: 2024-08-25

## 2024-11-11 NOTE — PROGRESS NOTES
[facility] MEDICAL ONCOLOGY - FOLLOW UP VISIT    Reason for visit: Stage IV adenocarcinoma of the lung      Oncology History   Adenocarcinoma of upper lobe of left lung   1/5/2023 Procedure    Bronchoscopy  JAZMYNE Biopsy  - Adenocarcinoma, (TTF-1 positive, p40 negative)    [facility] NGS  - EGFR L858R mutation positive, VAF 34.8%  - PD-L1 5%     2/1/2023 Procedure    Attempted JAZMYNE Resection, Aborted for Pleural / Diaphragmatic Involvement  1. Diaphragm nodules, biopsy:                                               - Moderately differentiated adenocarcinoma.                           2. Parietal pleura, biopsies:                                               - Moderately differentiated adenocarcinoma.                           3. Pericardial nodule, biopsy:                                              - Moderately differentiated adenocarcinoma, see Comment.                 Comment; Immunohistochemistry was performed on tissue block 3A.         The adenocarcinoma is positive for TTF-1, Austin-EP4, cytokeratin 7        and Napsin-A. Calretinin, cytokeratin 20 and cytokeratin 5/6 are        negative. This immunophenotype supports pulmonary origin.              4. Diaphragm nodule, biopsy:                                                - Atypical TTF-1 positive cells, cannot exclude malignancy.           5. Left pleural fluid for cytology (ThinPrep, two cytospins, and           cell block):                                                             - Positive for malignant cells, consistent with metastatic             adenocarcinoma, pulmonary origin.                                         Comment; Immunohistochemistry was performed on cell block 5A.           The malignant cells are positive for Austin-EP4 and TTF-1.           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 Had not improved on doxycycline and prednisone. Started of levofloxacin.      3/22/2024 Imaging Significant Findings    CT C  - Unchanged 1.7 cm JAZMYNE lesion  - Interval reduction in ground-glass opacities, w/ residual architectural distortion and pleuroparenchymal scarring most compatible w/ sequela of prior viral pneumonitis  - Mild consolidation in the superior segment LLL  -      3/22/2024 Tumor Conference    [place] Tumor Board  Difficult to determine extent of radiation vs drug-induced pneumonitis. There appear to be ground glass opacities outside of the treatment field, which would argue somewhat against a pure radiation pneumonitis.     Recommendations for Radiation Oncologists to do peer to peer with MD Rojas and for pt to follow up with Dr. Adams to discuss treatment options.        4/25/2024 -  Targeted Therapy    Erlotinib  - 150 mg daily     5/13/2024 Imaging Significant Findings    CT C  - Stable appearance of JAZMYNE spiculated mass w/ associated scarring anteriorly  - Additional increasing areas of fibrosis and pulmonary scarring including in the infrahilar region b/l extending posteriorly into lower lobes b/l, suggest changes secondary to localized radiation therapy  - No pathologic LN enlargement     7/15/2024 Imaging Significant Findings    MRI T Spine W/ and W/o  - osseous metastatic disease involving posterior aspect of T6 through T9, no pathologic compression fractures or  "evidence of epidural involvement  - Multilevel mild neural foraminal stenosis throughout thoracic spine  - No significant spinal canal stenosis     8/5/2024 Imaging Significant Findings    CT C/A/P  - Stable spiculated JAZMYNE opacity  - Medial bilateral lower lobe fibrosis likely radiation induced more prominent  - No new pulmonary nodule or mass  - No mediastinal or hilar adenopathy  - Stable sclerotic bone lesions at T6, T8, T9     11/8/2024 Imaging Significant Findings    CT C/A/P  2.3 x 1.3 x 1.4 cm spiculated JAZMYNE nodule, stable  Bilateral medial infrahilar fibrosis with mild improvement of associated ground-glass opacities consistent with postradiation treatment  No new findings to suggest progression     EGFR-related lung cancer   10/5/2023 Initial Diagnosis    EGFR-related lung cancer     10/6/2023 - 10/6/2023 Chemotherapy    Treatment Summary   Plan Name: OP OSIMERTINIB DAILY  Treatment Goal: Control  Status: Inactive  Start Date: 10/6/2023  End Date: 10/6/2023  Provider: Zenon Cheney MD  Chemotherapy: osimertinib (TAGRISSO) 80 mg Tab, 80 mg, Oral, Daily, 1 of 1 cycle, Start date: 10/6/2023, End date: 3/25/2024     3/22/2024 Tumor Conference    [place] Tumor Board  Difficult to determine extent of radiation vs drug-induced pneumonitis. There appear to be ground glass opacities outside of the treatment field, which would argue somewhat against a pure radiation pneumonitis.     Recommendations for Radiation Oncologists to do peer to peer with MD Rojas and for pt to follow up with Dr. Adams to discuss treatment options.        8/5/2024 Imaging Significant Findings    CT C/A/P  - Stable spiculated JAZMYNE opacity  - Medial bilateral lower lobe fibrosis likely radiation induced more prominent  - No new pulmonary nodule or mass  - No mediastinal or hilar adenopathy  - Stable sclerotic bone lesions at T6, T8, T9        Per Dr. Fields, 8/28/23:   "Mrs. Hancock is a 62-year-old lady with a history that dates to late " 2022 when she had upper respiratory infection. This prompted medical attention that resulted in a chest x-ray revealing a left upper lobe mass. This was followed by CT of the chest that confirmed a left upper lobe mass with additional satellite nodules of the lobe as well as nodules of the left pleura. She also had a nodule noted at the right base. She was sent for PET CT scan and ultimately had bronchoscopic biopsy. The left upper lobe was confirmed to be consistent with adenocarcinoma of lung primary.    She was sent to me for recommendations in preparation for an attempt at definitive surgery. Unfortunately, during her attempt for surgical resection, she was found to have metastatic disease to the pleural space with malignant involvement of pleural effusion.     She went on to complete 4 cycles of palliative chemotherapy with carboplatin, pemetrexed, and pembrolizumab with mixed CT findings resulting in recommendations for continuation of chemotherapy with pemetrexed and pembrolizumab. She has remained on pemetrexed and pembrolizumab for ongoing palliative therapy.     Patient presents to clinic in f/u for lung cancer prior to continuation of systemic therapy. She continues maintenance pemetrexed plus pembrolizumab. Since her previous appointment she did have dermatology evaluation of a rash who performed punch biopsy. She was initiated on steroid topical by dermatology with improvement. In the interim she also reported recent increase in frontal headaches that resulted in ordering of MRI of the brain. Other than above noted rash and headache she has continued to tolerate palliative Pembro plus pemetrexed well. She currently denies any pain. She denies any shortness of breath. She denies any nausea or vomiting. She denies any diarrhea or constipation.    1. Metastatic left lung adenocarcinoma: She has completed 4 cycles of palliative chemotherapy with carboplatin, pemetrexed, and pembrolizumab with mixed CT  "findings resulting in recommendations for continuation of chemotherapy with pemetrexed and pembrolizumab. She has continued pemetrexed plus pembrolizumab for maintenance palliative therapy with positive radiographic response. CT scan results discussed in detail with patient. Multiple questions were asked by patient during discussion of CT scan results, all of which were answered in detail to her apparent satisfaction. With continued positive radiographic response and without dermatologic evaluation suggest immune mediated skin reaction, recommendations made for patient to continue pemetrexed plus pembrolizumab for ongoing palliative maintenance therapy. Patient verbalized understanding and agrees with recommendations as made.    Once again discussed the need to refrain from the use of systemic steroid therapy with immunotherapy given the theoretical risk that systemic steroid could augment the T-cell response.     2. Rash - s/p punch biopsy of left distal pretibial region on 8/3/2023 pathologic consistent with spongiotic dermatitis with eosinophils; negative for medication reaction per dermatology. She will continue topical management per dermatology recommendations. "     Oncology History        HPI:     JACOBO COLEMAN is a 63 y.o. female with pmh significant for MDD, GERD, and hypothyroidism who presents for follow up of the below lung cancer. Pt was previously treated by Dr. Hossein Fields ([facility]) and Dr. Cheney.    1) Stage IV adenocarcinoma of the JAZMYNE w/ pleural involvement, malignant pleural effusion, and mets to the thoracic spine (EGFR L858R mutated, PD-L1 5%), initially dx'd 1/5/23, s/o carboplatin/pemetrexed/pembrolizumab x4 cycles followed by pemetrexed/pembroliuzmab maintenance (2/2023-8/2023, complicated by AEs), osimertinib (10/6/23 - 2/28/24; stopped for possible drug-induced pneumonitis), palliative XRT to T6-T9 vertebrae and adacent paravertebral L rib lesions (22.5 Gy/% Fx, EOT " "1/18/24), and erlotinib (4/25/24 - present)     Last clinic 8/6/24, most recent imaging with stable disease.  Patient declines dose reduction despite diarrhea (using Imodium) rash, paronychia.  Repeat imaging in 3 months.    Interval History:  8/6/24:  Palliative, try baclofen for back spasms, refer for interventional pain nerve block.  09/19/24: Pain medicine, T7/T8 paravertebral block  10/3/24: L T7/8 paravertebral block  Reason for 24:  Pain medicine follow up, 95% pain relief after injection.    Since our last visit, the pt states that she had a bad sinusitis, for which she completed 2 courses of abx. She also completed a dose of steroids with this.     She continues to endorse dyspnea w/ bending over (to pull weeds), in the shower, and when it is hot. She says "I still cannot walk an extended distance". She says that her lowest O2 sat was 92% when she had the above sinus infection.     She notes that her diarrhea has significantly improved on benefiber. She says that she is using the imodium "rarely ever".     She says that her back pain and L rib pain significantly improved since the above pain procedure.     She says that she is "very fatigued", but has difficulty falling asleep. She is taking lunesta and doxepin.     She says that she still has a mild rash over her cheeks. She notes a single focus of rash on her R thigh, but otherwise confirms this remains significantly improved on her arms, neck, and back.     She says that her paronychia on her third L finger has persisted (notes that it has changed sides). She says that it's "not too bad - it drains, and then it feels better".     She feels that her alopecia has largely stopped, though had to cut her hair.     She says that her anxiety is much improved compared to prior.     Rosalie power wash    ROS:   As per HPI.       Physical Exam:       /79   Pulse 82   Temp 97.4 °F (36.3 °C) (Temporal)   Ht 5' 10" (1.778 m)   Wt 78.7 kg (173 lb 8 oz)   SpO2 " 98%   BMI 24.89 kg/m²                Physical Exam  Constitutional:       Appearance: Normal appearance.   HENT:      Head: Normocephalic and atraumatic.   Eyes:      Extraocular Movements: Extraocular movements intact.      Conjunctiva/sclera: Conjunctivae normal.      Pupils: Pupils are equal, round, and reactive to light.   Cardiovascular:      Rate and Rhythm: Normal rate and regular rhythm.      Heart sounds: No murmur heard.     No friction rub. No gallop.   Pulmonary:      Effort: Pulmonary effort is normal.      Breath sounds: No wheezing, rhonchi or rales.   Musculoskeletal:         General: Normal range of motion.   Skin:     General: Skin is warm and dry.      Comments: Slight erythema over cheeks bilaterally, resolved rash over arms, chest, neck.   Neurological:      Mental Status: She is alert and oriented to person, place, and time.   Psychiatric:         Mood and Affect: Mood normal.         Thought Content: Thought content normal.         Judgment: Judgment normal.           Labs:   No results found for this or any previous visit (from the past 48 hours).       Imaging:    See oncologic history above.     Path:  See oncologic history above.      Assessment and Plan:     JACOBO COLEMAN is a 63 y.o. female with pmh significant for MDD, GERD, and hypothyroidism who presents for follow up of the below lung cancer. Pt was previously treated by Dr. Hossein Fields ([facility]) and Dr. Cheney.    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  Patient is currently on erlotinib, treatment complicated by acneiform rash, diarrhea, paronychia, hair loss.  Have discussed these symptoms at length as below, patient previously stated she was not interested in a dose reduction or treatment holiday; many of these symptoms appear improved now.  Most recent imaging (CT C, 11/8/24, on erlotinib ~6.5 monhts) demonstrated stable appearance of the JAZMYNE spiculated mass, lack of mediastinal or hilar lymphadenopathy, improvement in fibrotic lung chances, and stable sclerotic bone lesions (T6, T8, T9).  We held another conversation regarding possible dose reduction or even reversion to osimertinib Continue w/ q3m imaging, labs, and follow up until progression or intolerance.    PLAN:   Continue erlotinib 150 mg daily  CT C/A/P in 3 months  Labs w/ imaging  RTC at least 1 day after imaging  Pt prefers to follow at [facility]  Pt given phone number to [facility] desk, and SW has sent a message to establish an account      Diarrhea  Improved compared to last visit, particularly while using benefiber.  Patient now using Imodium infrequently.  Related to erlotinib.    Continue imodium PRN  Consider lomotil should symptoms worsen      Mid-Back Pain  Patient with worsening mid back pain, tender to palpation over spinous processes as well as bilateral paraspinous muscles (L>R).  She notes pain worsening throughout the day, and numbness in her R middle finger alone (not in her first or second fingers) which also worsened throughout the day and is improved with decompression .  Based on tenderness paraspinous muscles, may be muscular in origin, however notably patient did receive palliative radiation to metastatic disease in her T6-T9 vertebra.  In concert with potential radiculopathic symptoms, MRI T spine was obtained and was without evidence of compression  fracture or evidence of epidural involvement / canal stenosis. Pain now markedly improved after T7/T8 paravertebral block.   Continue f/u w/ pain medicine  Continue f/u w/ palliative care      Bony Mets  -- Consider zometa at next visit, noting stable and treated lesions      G1 EGFR Inhibitor-Induced Acneiform Rash  Patient initially developed an acneiform rash over her face, chest, and scalp.  It was mildly pruritic and tender. It covered ~10% of her body surface area.  Notably, she had not been using doxycycline or hydrocortisone prophylaxis as previously discussed.  Also notably, she was on steroids until 1 week prior which may have alleviated the initial symptoms.  Patient has since received clindamycin gel  and hydrocortisone 2.5%, used to moderate effect.  She continues to have some erythema over her cheeks b/l but no further pustular lesions.  Patient states she does not want take doxycycline as this often causes her to have an upset stomach.  Continue hydrocortisone 2.5% for areas of eruption  Continue clindamycin 1% gel as needed (pt notes she has clindamycin wipes)  Previously discussed using sun protection (SPF, wide brimmed hat, sun shirts)      Paronychia  Paronychia now largely confined to L third finger (improved compared to prior). Has been using a higher potency steroid cream from her dermatologist to good effect. Previously denied interest in further evaluation by specialist.  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  Continue steroid cream PRN      Pneumonitis/Radiation Fibrosis  Dyspnea  See previous notes for description.  Most recent imaging demonstrates evolving likely radiation fibrosis.  Symptomatically, patient has improved in his no longer taking steroids.  She says that she has her good days and bad days, but notes that her sats are consistently above 92%.  She still has some difficulty when bending over and in the heat.   CTM      The  above information has been reviewed with the patient and all questions have been answered to their apparent satisfaction.  They understand that they can call the clinic with any questions.    Marin Adams MD  Hematology/Oncology  [facility] [place]      Route Chart for Scheduling    Med Onc Chart Routing      Follow up with physician .  CT C/A/P in 3 months  Labs w/ imaging  RTC at least 1 day after imaging, pt prefers [facility]   Follow up with [facility]    Infusion scheduling note    Injection scheduling note    Labs    Imaging    Pharmacy appointment    Other referrals                    Disclaimer: This note was prepared using voice recognition system and is likely to have sound alike errors and is not proof read.  Please call me with any questions.

## 2024-11-11 NOTE — PROGRESS NOTES
Pt requested information on disability. DARIO provided pt with [place] disability office phone number and sent pt's information to [facility] dept to contact pt to assist. DARIO will remain available.

## 2024-11-12 ENCOUNTER — PATIENT MESSAGE (OUTPATIENT)
Dept: HEMATOLOGY/ONCOLOGY | Facility: CLINIC | Age: 64
End: 2024-11-12
Payer: COMMERCIAL

## 2024-11-29 ENCOUNTER — PATIENT MESSAGE (OUTPATIENT)
Dept: PAIN MEDICINE | Facility: CLINIC | Age: 64
End: 2024-11-29
Payer: COMMERCIAL

## 2024-11-29 NOTE — TELEPHONE ENCOUNTER
I sent patient a MyChart message letting her know that we are going to work on her appeal.    Sharon CHAMORRO ([facility])

## 2024-12-07 ENCOUNTER — PATIENT MESSAGE (OUTPATIENT)
Dept: ADMINISTRATIVE | Facility: OTHER | Age: 64
End: 2024-12-07
Payer: COMMERCIAL

## 2025-01-04 ENCOUNTER — PATIENT MESSAGE (OUTPATIENT)
Dept: PAIN MEDICINE | Facility: CLINIC | Age: 65
End: 2025-01-04
Payer: MEDICARE

## 2025-01-08 ENCOUNTER — PATIENT MESSAGE (OUTPATIENT)
Dept: ADMINISTRATIVE | Facility: OTHER | Age: 65
End: 2025-01-08
Payer: MEDICARE

## 2025-01-09 ENCOUNTER — PATIENT MESSAGE (OUTPATIENT)
Dept: PAIN MEDICINE | Facility: CLINIC | Age: 65
End: 2025-01-09

## 2025-01-09 ENCOUNTER — OFFICE VISIT (OUTPATIENT)
Dept: PAIN MEDICINE | Facility: CLINIC | Age: 65
End: 2025-01-09
Payer: MEDICARE

## 2025-01-09 ENCOUNTER — TELEPHONE (OUTPATIENT)
Dept: PAIN MEDICINE | Facility: CLINIC | Age: 65
End: 2025-01-09

## 2025-01-09 DIAGNOSIS — G89.3 CANCER RELATED PAIN: ICD-10-CM

## 2025-01-09 DIAGNOSIS — G89.12 POST-THORACOTOMY PAIN: Primary | ICD-10-CM

## 2025-01-09 NOTE — PROGRESS NOTES
"Chronic Pain-Tele-Medicine-Established Note (Follow up visit)    The patient location is:  [place]  The chief complaint leading to consultation is: Back pain  Visit type: Virtual visit with synchronous audio and video  Total time spent with patient:  5-10 minutes  Each patient to whom he or she provides medical services by telemedicine is: (1) informed of the relationship between the physician and patient and the respective role of any other health care provider with respect to management of the patient; and (2) notified that he or she may decline to receive medical services by telemedicine and may withdraw from such care at any time.    Notes:    Follow up injection      SUBJECTIVE:      Interval History (1/9/2025):   JACOBO COLEMAN presents today for follow-up visit.  Patient was last seen on 11/4/2024. For the past month, she has started to experience the same, exact pain she had prior to the paravertebral block last year. Patient states her pain is localized to Left "rib" area.  Patient reports pain as 5/10 today, however towards the end of the day it is a 9/10. She is interested in repeat block.     Patient was utilizing Ibuprofen and Tylenol but had to discontinue due to nose bleeds.    Interval HPI 11/4/2024:  JACOBO COLEMAN is a 64 y.o. female who presents today for follow-up injection after receiving a left-sided paravertebral block at T7-8 on 10/03/2024.  She reports 95% relief with this injection and has sustained relief at this time.  She was very pleased with the results of this injection in his requiring minimal amounts of analgesics for her off and on pain.  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    Patient denies night fever/night sweats, urinary incontinence, bowel incontinence, significant weight loss, significant motor weakness, and loss of sensations.    Pain " Disability Index Review:      9/19/2024     3:18 PM   Last 3 PDI Scores   Pain Disability Index (PDI) 35     Initial HPI 09/19/2024:  JACOBO COLEMAN is a 63 y.o. female who presents to the clinic for the evaluation of back and leg pain.  She was referred by her primary care provider for further evaluation and management of this pain.  She has past medical history of anxiety, lung cancer s/p chemotherapy and radiation, hypercholesterolemia, immunodeficiency, DM2, hypothyroidism, and multiple other medical comorbidities as listed in her chart.  The pain started about 2 years ago following metastatic spread to or thoracic spine her primary lung cancer and also subsequent vats procedure and symptoms have been unchanged.The pain is located in the left midthoracic area and radiates to the left anterior and lateral chest wall.  The pain is described as sharp, stabbing, aching, dull and is rated as 5/10. The pain is rated with a score of  5/10 on the BEST day and a score of 9/10 on the WORST day.  Symptoms interfere with daily activity. The pain is exacerbated by movement.  The pain is mitigated by changing her positions and medications. Employment status:  Nurse, currently working in hospice care      Non-Pharmacologic Treatments:  Physical Therapy/Home Exercise: yes  Ice/Heat:yes  TENS: no  Acupuncture: no  Massage: yes  Chiropractic: no    Other: no      Pain Medications:  - Opioids:  Tramadol  - Adjuvant Medications:  Celebrex, Lunesta, fluoxetine, NSAIDs, Medrol Cory  - Anti-Coagulants:  None     report:  Reviewed and consistent with medication use as prescribed.    Pain Procedures:   -10/03/2024:  Left T7/8 paravertebral block, 95% relief for 3 months    Imaging:   CT chest abdomen pelvis 08/05/2024:   Chest: Stable spiculated opacity in the anterior left upper lobe with adjacent architectural distortion..  Medial bilateral lower lobe fibrosis likely radiation-induced more prominent on today's exam.  No new  pulmonary nodule or mass.     No mediastinal or hilar adenopathy.  Heart is normal in size without pericardial effusion.  Noncalcified coronary arteries.  Osseous structures are intact.  Sclerotic bone lesions T6, T8 and T9 are stable.  No pathologic fracture.  No new metastasis.        Abdomen/pelvis: Liver and spleen unremarkable.  Stable left renal cyst.  No obstructive uropathy.  Adrenal glands and pancreas within normal limits.  Gallbladder surgically absent.     Diverticulosis of the sigmoid colon.  Nonobstructive bowel gas pattern.  No adenopathy.  No suspicious osseous lesions.  No free air free fluid.    MRI thoracic spine 07/15/2024:  Alignment: Within normal limits.     Vertebrae: Thoracic vertebral body heights are maintained.  Abnormal sclerotic appearing foci with STIR signal within the predominantly posterior aspects of the T6, T7, T8 and T9 vertebral bodies as demonstrated on prior CTs.  Multilevel endplate irregularity/degenerative change appears most pronounced at T9-T10 with minimal associated endplate edema.  No evidence of an acute fracture.     Discs: Disc desiccation throughout the thoracic spine.  Multilevel height loss is most pronounced at T9-T10.     Cord: No cord signal abnormality.     Enhancement: Faint associated enhancement of the involved T6, T7, T8 and T9 vertebral bodies.  There appears to be incomplete fat suppression of the post contrasted series involving the proximal thoracic spine from T1 through T4 limiting evaluation for enhancement in that region.  No abnormal epidural or intrathecal enhancement.     Degenerative findings: Minor posterior thoracic disc bulges at T3-T4 and T4-T5.  Additional minor left paracentral T9-T10 disc bulge.  No ventral cord contact or spinal canal stenosis.  Mild multilevel facet arthropathy contributing to mild right-sided T9-T10 and mild left-sided T2-T3, T3-T4, T9-T10 and T10-T11 neural foraminal stenosis.  There are several perineural root  sleeve cysts, the largest on the left at T7-T8.     Paraspinal muscles & soft tissues: Paraspinal soft tissues appear within normal limits.  Consolidative opacities in the lung as best demonstrated on prior CT.        PMHx,PSHx, Social history, and Family history:  I have reviewed the patient's medical, surgical, social, and family history in detail and updated the computerized patient record.          Review of patient's allergies indicates:   Allergen Reactions    Xylocaine with epinephrine [lidocaine-epinephrine] Anaphylaxis    Benefiber (wheat dextrin) [wheat dextrin]     Lipitor [atorvastatin] Other (See Comments)     Body aches      Methocarbamol-aspirin     Zetia [ezetimibe] Other (See Comments)     Muscle spasms      Augmentin [amoxicillin-pot clavulanate] Rash    Macrodantin [nitrofurantoin macrocrystal] Rash    Omnicef [cefdinir] Rash and Other (See Comments)     GI upset    Pravastatin Rash    Sulfa (sulfonamide antibiotics) Rash       Current Outpatient Medications   Medication Sig    albuterol (VENTOLIN HFA) 90 mcg/actuation inhaler Inhale 2 puffs into the lungs every 6 (six) hours as needed for Wheezing. Rescue    albuterol-ipratropium (DUO-NEB) 2.5 mg-0.5 mg/3 mL nebulizer solution Take 3 mLs by nebulization every 6 (six) hours as needed for Wheezing or Shortness of Breath. Rescue    azelastine (ASTELIN) 137 mcg (0.1 %) nasal spray 1 spray by Nasal route 2 (two) times daily.    budesonide-glycopyr-formoterol 160-9-4.8 mcg/actuation HFAA Inhale 2 puffs into the lungs 2 (two) times daily. Wash out mouth after use    cetirizine (ZYRTEC) 10 MG tablet Take 5 mg by mouth once daily.    clindamycin phosphate 1% (CLINDAGEL) 1 % gel Apply topically 2 (two) times daily.    doxepin (SINEQUAN) 100 MG capsule     doxycycline (VIBRAMYCIN) 100 MG Cap Take 1 capsule (100 mg total) by mouth 2 (two) times daily.    erlotinib (TARCEVA) 150 MG tablet Take 1 tablet (150 mg total) by mouth once daily.    eszopiclone  (LUNESTA) 3 mg Tab Take 3 mg by mouth every evening.    famotidine (PEPCID) 20 MG tablet Take 1 tablet (20 mg total) by mouth 2 (two) times daily as needed for Heartburn.    fluticasone propionate (FLONASE) 50 mcg/actuation nasal spray 1 spray by Each Nostril route 2 (two) times daily.    hydrocortisone 2.5 % cream Apply topically 2 (two) times daily.    ipratropium (ATROVENT) 21 mcg (0.03 %) nasal spray SMARTSI Spray(s) Both Nares 2-3 Times Daily    levothyroxine (SYNTHROID) 88 MCG tablet Take 88 mcg by mouth every evening.    multivitamin capsule Take 1 capsule by mouth once daily.    mupirocin (BACTROBAN) 2 % ointment Apply topically 3 (three) times daily.    ondansetron (ZOFRAN-ODT) 4 MG TbDL Take 2 mg by mouth every 6 (six) hours as needed.    predniSONE (DELTASONE) 10 MG tablet Take 1 tablet (10 mg total) by mouth once daily.    promethazine (PHENERGAN) 25 MG tablet Take 25 mg by mouth every 6 (six) hours as needed.     No current facility-administered medications for this visit.       Review of Systems     GENERAL:  No weight loss, malaise or fevers.  HEENT:   No recent changes in vision or hearing  NECK:  Negative for lumps, no difficulty with swallowing.  RESPIRATORY:  Negative for cough, wheezing or shortness of breath, patient denies any recent URI.  CARDIOVASCULAR:  Negative for chest pain, leg swelling or palpitations.  GI:  Negative for abdominal discomfort, blood in stools or black stools or change in bowel habits.  MUSCULOSKELETAL:  See HPI.  SKIN:  Negative for lesions, rash, and itching.  PSYCH:  No mood disorder or recent psychosocial stressors.  Patients sleep is not disturbed secondary to pain.  HEMATOLOGY/LYMPHOLOGY:  Negative for prolonged bleeding, bruising easily or swollen nodes.  Patient is not currently taking any anti-coagulants  NEURO:   No history of headaches, syncope, paralysis, seizures or tremors.  All other reviewed and negative other than HPI.    OBJECTIVE:    Telemedicine  Physical Exam:   There were no vitals filed for this visit.  There is no height or weight on file to calculate BMI.   (reviewed on 1/9/2025)     (Physical exam performed virtually with patient guided on specific actions and diagnostic maneuvers)  GENERAL: Well appearing, in no acute distress, alert and oriented x3.  Cooperative.  PSYCH:  Mood and affect appropriate.  SKIN: Skin color & texture with no obvious abnormalities.    HEAD/FACE:  Normocephalic, atraumatic.    PULM:  No difficulty breathing. No nasal flaring. No obvious wheezing.  EXTREMITIES: No obvious deformities. Moving all extremities well, appears to have symmetric strength throughout.  MUSCULOSKELETAL: No obvious atrophy abnormalities are noted.   NEURO: No obvious neurologic deficit.   GAIT: sitting.     Physical Exam: last in clinic visit:      Physical Exam    Physical exam:  GENERAL: Well appearing, in no acute distress, alert and oriented x3.    PSYCH:  Mood and affect appropriate.  SKIN: Skin color, texture, turgor normal, no rashes or lesions.  HEAD/FACE:  Normocephalic, atraumatic. Cranial nerves grossly intact.  CV:  No peripheral edema noted  PULM:  No difficulty breathing             LABS:  Lab Results   Component Value Date    WBC 6.30 11/08/2024    HGB 13.0 11/08/2024    HCT 39.3 11/08/2024    MCV 90 11/08/2024     11/08/2024       CMP  Sodium   Date Value Ref Range Status   11/08/2024 141 136 - 145 mmol/L Final     Potassium   Date Value Ref Range Status   11/08/2024 4.4 3.5 - 5.1 mmol/L Final     Chloride   Date Value Ref Range Status   11/08/2024 111 (H) 95 - 110 mmol/L Final     CO2   Date Value Ref Range Status   11/08/2024 22 (L) 23 - 29 mmol/L Final     Glucose   Date Value Ref Range Status   11/08/2024 107 70 - 110 mg/dL Final     BUN   Date Value Ref Range Status   11/08/2024 17 8 - 23 mg/dL Final     Creatinine   Date Value Ref Range Status   11/08/2024 1.0 0.5 - 1.4 mg/dL Final     Calcium   Date Value Ref Range Status    11/08/2024 9.7 8.7 - 10.5 mg/dL Final     Total Protein   Date Value Ref Range Status   11/08/2024 7.1 6.0 - 8.4 g/dL Final     Albumin   Date Value Ref Range Status   11/08/2024 3.9 3.5 - 5.2 g/dL Final     Total Bilirubin   Date Value Ref Range Status   11/08/2024 0.3 0.1 - 1.0 mg/dL Final     Comment:     For infants and newborns, interpretation of results should be based  on gestational age, weight and in agreement with clinical  observations.    Premature Infant recommended reference ranges:  Up to 24 hours.............<8.0 mg/dL  Up to 48 hours............<12.0 mg/dL  3-5 days..................<15.0 mg/dL  6-29 days.................<15.0 mg/dL       Alkaline Phosphatase   Date Value Ref Range Status   11/08/2024 125 40 - 150 U/L Final     AST   Date Value Ref Range Status   11/08/2024 28 10 - 40 U/L Final     ALT   Date Value Ref Range Status   11/08/2024 32 10 - 44 U/L Final     Anion Gap   Date Value Ref Range Status   11/08/2024 8 8 - 16 mmol/L Final       Lab Results   Component Value Date    HGBA1C 6.3 (H) 02/28/2024         ASSESSMENT: 64 y.o. year old female with midback pain, consistent with     1. Post-thoracotomy pain  Case Request-RAD/Other Procedure Area: Paravertebral block at T7-8      2. Cancer related pain  Case Request-RAD/Other Procedure Area: Paravertebral block at T7-8                PLAN:   - Interventions:  Schedule patient for repeat Left sided Paravertebral block at T7/8 . Explained the risks and benefits of the procedure in detail with the patient today in clinic along with alternative treatment options, and the patient elected to pursue the intervention.    Patient had great relief with last block. Reported over 90% relief for 3 months.    S/p left-sided paravertebral block at T7/8 on 10/03/2024 with 95% relief    - Anticoagulation use:  None    - Medications: I have stressed the importance of physical activity and a home exercise plan to help with pain and improve health. and  Patient can continue with medications for now since they are providing benefits, using them appropriately, and without side effects.         - Therapy:  Advised patient continue with activities as tolerated.     - Psychological:  Discussed coping mechanisms to help address chronic pain issues    - Labs:  Reviewed    - Imaging: Reviewed available imaging with patient and answered any questions they had regarding study.    - Consults/Referrals:  None at this time    - Records:  Reviewed/Obtain old records from outside physicians and imaging    - Follow up visit: return to clinic 4-6 weeks after intervention.    - Counseled patient regarding the importance of activity modification and physical therapy    - This condition does not require this patient to take time off of work, and the primary goal of our Pain Management services is to improve the patient's functional capacity.    - Patient Questions: Answered all of the patient's questions regarding diagnosis, therapy, and treatment        The above plan and management options were discussed at length with patient. Patient is in agreement with the above and verbalized understanding.      Vanna Vizcarra PA-C  Interventional Pain Management  [facility]    Visit today included increased complexity associated with the care of the episodic problem of chronic pain which was addressed and continue to manage the longitudinal care of the patient due to the serious and/or complex managed problem(s) listed above.

## 2025-01-09 NOTE — TELEPHONE ENCOUNTER
Called patient and scheduled her procedure and procedure follow up appt. Answered all questions and sent procedure instructions. Explained procedure instructions. Pt verbalized understanding.    ISAAC

## 2025-01-14 ENCOUNTER — OFFICE VISIT (OUTPATIENT)
Dept: PULMONOLOGY | Facility: CLINIC | Age: 65
End: 2025-01-14
Payer: MEDICARE

## 2025-01-14 VITALS
RESPIRATION RATE: 17 BRPM | SYSTOLIC BLOOD PRESSURE: 136 MMHG | DIASTOLIC BLOOD PRESSURE: 82 MMHG | OXYGEN SATURATION: 98 % | HEIGHT: 70 IN | BODY MASS INDEX: 24.84 KG/M2 | WEIGHT: 173.5 LBS | HEART RATE: 85 BPM

## 2025-01-14 DIAGNOSIS — J70.0 POST-RADIATION PNEUMONITIS: Chronic | ICD-10-CM

## 2025-01-14 DIAGNOSIS — C34.12 ADENOCARCINOMA OF UPPER LOBE OF LEFT LUNG: Primary | ICD-10-CM

## 2025-01-14 PROCEDURE — 99999 PR PBB SHADOW E&M-EST. PATIENT-LVL V: CPT | Mod: PBBFAC,,, | Performed by: INTERNAL MEDICINE

## 2025-01-14 PROCEDURE — 99214 OFFICE O/P EST MOD 30 MIN: CPT | Mod: S$PBB,,, | Performed by: INTERNAL MEDICINE

## 2025-01-14 PROCEDURE — 99215 OFFICE O/P EST HI 40 MIN: CPT | Mod: PBBFAC | Performed by: INTERNAL MEDICINE

## 2025-01-14 NOTE — PROGRESS NOTES
Subjective:      Patient ID: JACOBO COLEMAN is a 64 y.o. female.    Chief Complaint: F/U lung CA    Cough        63 y.o. 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 Pt is also s/p palliative XRT to T6-T9 vertebrae and adacent paravertebral L rib lesions (22.5 Gy/% Fx, EOT 1/18/24) Pt was previously treated by Dr. Hossein Fields ([facility]) and Dr. Cheney.       2/24 and treated with Doxy and prednisone taper. She was seen by Dr. Cheney 2/28/24- CTA chest ordered to rule out PE as cause of shortness of breath. CTA was negative for PE- but with bilateral GGO, new from 12/2023. She was admitted to the hospital- treated with Levaquin and breathing treatments. Discharged 3/1 with po levaqin.      3/25/2024:  Patient states that, since tapering her prednisone from 20 b.i.d. to 20 daily, her shortness of breath has worsened.  She describes episodes of chest tightness and shortness of breath.  During these episodes, she has a cough productive of liquid sputum, sometimes described as frothy .  These episodes can occur either with exertion or at rest.  She notes intermittent desaturations, saying that she was in the mid 70s this morning.  She denies any chest pain.  She confirms that she is taking nebulizers as scheduled and also p.r.n..  She is also taking Mucinex 800 b.i.d..  She says that her appetite has been good.      April 2024  Feeling better overall while on her prednisone taper.  Just occasional dry cough no sputum production.  Denies fevers, chills and chest pain.  She does  have episodes of bronchospasm which he treats with DuoNeb.  Previous visit she had a 6 minute walk test done that showed desaturation to 88% and she does have portable oxygen that she uses occasionally.  Otherwise no new complaints.  She is scheduled to start Erlotinib in the near future per Heme-Onc notes.     Current inhaler regimen is Breztri twice a day, duo neb every 6 hours as needed and albuterol via rescue inhaler p.r.n. also taking Mucinex 600 mg b.i.d.     July 2024  Here today for follow up of the above  Continues on Tarceva maintenance therapy  Recently saw Heme-Onc  Has not needed prednisone since our last visit  Exercise capacity has improved significantly  Overall feels better  Does have significant GI side-effects from Tarceva and inquired about possibility of converting back to [place]    Jan 2025  Here for follow up of the above  Continues on Tarceva maintenance  Tolerating fairly well thus far  Overall states her pulmonary symptoms are minimal has not had to use her rescue inhaler  Does not complain of any ongoing cough, fever, chills or chest pain, no hemoptysis  Most recent imaging in November was stable to improved    Review of Systems   Respiratory:  Positive for cough.     as per history of present illness otherwise negative  Objective:     Physical Exam   Constitutional: She is oriented to person, place, and time. She appears well-developed. No distress.   HENT:   Head: Normocephalic.   Cardiovascular: Normal rate and regular rhythm.   Pulmonary/Chest: Normal expansion, symmetric chest wall expansion, effort normal and breath sounds normal.   Musculoskeletal:      Cervical back: Neck supple.   Neurological: She is alert and oriented to person, place, and time.   Psychiatric: She has a normal mood and affect.   Nursing note and vitals reviewed.          1/14/2025     1:25 PM 11/11/2024     2:26 PM 10/3/2024     1:29 PM 10/3/2024     1:19 PM 10/3/2024     1:09 PM 10/3/2024     1:04 PM  "10/3/2024    12:59 PM   Pulmonary Function Tests   SpO2 98 % 98 % 98 % 97 % 96 % 100 % 100 %   Height 5' 10" (1.778 m) 5' 10" (1.778 m)        Weight 78.7 kg (173 lb 8 oz) 78.7 kg (173 lb 8 oz)        BMI (Calculated) 24.9 24.9             Assessment:     1. Adenocarcinoma of upper lobe of left lung    2. Post-radiation pneumonitis      I personally reviewed CT chest images.  I agree with the radiologist's interpretation as below    Impression:     1. No new findings to suggest progression.  2. Unchanged spiculated nodule in the left upper lobe and findings compatible with post radiation treatment.    Plan:     Stable oncologic disease radiographically  Doing well from a symptomatic standpoint  Continue Breztri b.i.d. and as needed albuterol  Continue to follow up with Heme-Onc  Return to see me in 6 months, sooner if needed     "

## 2025-01-27 ENCOUNTER — TELEPHONE (OUTPATIENT)
Dept: OPHTHALMOLOGY | Facility: CLINIC | Age: 65
End: 2025-01-27
Payer: MEDICARE

## 2025-01-27 NOTE — TELEPHONE ENCOUNTER
attempted to call pt. to R/S her appt. that was moved from the snow to a earlier date, a VM was left

## 2025-01-30 ENCOUNTER — PATIENT MESSAGE (OUTPATIENT)
Dept: PAIN MEDICINE | Facility: CLINIC | Age: 65
End: 2025-01-30
Payer: MEDICARE

## 2025-02-10 ENCOUNTER — HOSPITAL ENCOUNTER (OUTPATIENT)
Dept: RADIOLOGY | Facility: HOSPITAL | Age: 65
Discharge: HOME | End: 2025-02-10
Attending: HOSPITALIST
Payer: MEDICARE

## 2025-02-10 DIAGNOSIS — C34.12 ADENOCARCINOMA OF UPPER LOBE OF LEFT LUNG: ICD-10-CM

## 2025-02-10 LAB
CREAT SERPL-MCNC: 1.1 MG/DL (ref 0.5–1.4)
SAMPLE: NORMAL

## 2025-02-10 PROCEDURE — 74177 CT ABD & PELVIS W/CONTRAST: CPT | Mod: 26,,, | Performed by: STUDENT IN AN ORGANIZED HEALTH CARE EDUCATION/TRAINING PROGRAM

## 2025-02-10 PROCEDURE — 74177 CT ABD & PELVIS W/CONTRAST: CPT | Mod: TC

## 2025-02-10 PROCEDURE — 71260 CT THORAX DX C+: CPT | Mod: 26,,, | Performed by: STUDENT IN AN ORGANIZED HEALTH CARE EDUCATION/TRAINING PROGRAM

## 2025-02-10 PROCEDURE — 25500020 PHARM REV CODE 255: Performed by: HOSPITALIST

## 2025-02-10 RX ADMIN — IOHEXOL 100 ML: 350 INJECTION, SOLUTION INTRAVENOUS at 10:02

## 2025-02-11 ENCOUNTER — PATIENT MESSAGE (OUTPATIENT)
Dept: HEMATOLOGY/ONCOLOGY | Facility: CLINIC | Age: 65
End: 2025-02-11
Payer: MEDICARE

## 2025-02-11 ENCOUNTER — OFFICE VISIT (OUTPATIENT)
Dept: HEMATOLOGY/ONCOLOGY | Facility: CLINIC | Age: 65
End: 2025-02-11
Payer: MEDICARE

## 2025-02-11 ENCOUNTER — LAB VISIT (OUTPATIENT)
Dept: LAB | Facility: HOSPITAL | Age: 65
End: 2025-02-11
Attending: HOSPITALIST
Payer: MEDICARE

## 2025-02-11 VITALS
OXYGEN SATURATION: 97 % | SYSTOLIC BLOOD PRESSURE: 106 MMHG | DIASTOLIC BLOOD PRESSURE: 66 MMHG | BODY MASS INDEX: 24.44 KG/M2 | HEIGHT: 70 IN | WEIGHT: 170.75 LBS | HEART RATE: 87 BPM | TEMPERATURE: 97 F

## 2025-02-11 DIAGNOSIS — F41.9 ANXIETY: ICD-10-CM

## 2025-02-11 DIAGNOSIS — C34.12 ADENOCARCINOMA OF UPPER LOBE OF LEFT LUNG: Primary | ICD-10-CM

## 2025-02-11 DIAGNOSIS — M54.10 BACK PAIN WITH LEFT-SIDED RADICULOPATHY: ICD-10-CM

## 2025-02-11 DIAGNOSIS — L70.8 ACNEIFORM RASH: ICD-10-CM

## 2025-02-11 DIAGNOSIS — C34.12 ADENOCARCINOMA OF UPPER LOBE OF LEFT LUNG: ICD-10-CM

## 2025-02-11 DIAGNOSIS — Z75.8 DOES NOT HAVE PRIMARY CARE PROVIDER: ICD-10-CM

## 2025-02-11 DIAGNOSIS — L03.012 PARONYCHIA OF FINGER OF LEFT HAND: ICD-10-CM

## 2025-02-11 DIAGNOSIS — R19.7 DIARRHEA, UNSPECIFIED TYPE: ICD-10-CM

## 2025-02-11 DIAGNOSIS — J70.0 RADIATION PNEUMONITIS: ICD-10-CM

## 2025-02-11 DIAGNOSIS — C79.51 SECONDARY MALIGNANT NEOPLASM OF BONE: ICD-10-CM

## 2025-02-11 LAB
ALBUMIN SERPL BCP-MCNC: 3.9 G/DL (ref 3.5–5.2)
ALP SERPL-CCNC: 125 U/L (ref 40–150)
ALT SERPL W/O P-5'-P-CCNC: 32 U/L (ref 10–44)
ANION GAP SERPL CALC-SCNC: 8 MMOL/L (ref 8–16)
AST SERPL-CCNC: 27 U/L (ref 10–40)
BILIRUB SERPL-MCNC: 0.7 MG/DL (ref 0.1–1)
BUN SERPL-MCNC: 21 MG/DL (ref 8–23)
CALCIUM SERPL-MCNC: 9.6 MG/DL (ref 8.7–10.5)
CHLORIDE SERPL-SCNC: 112 MMOL/L (ref 95–110)
CO2 SERPL-SCNC: 23 MMOL/L (ref 23–29)
CREAT SERPL-MCNC: 1 MG/DL (ref 0.5–1.4)
ERYTHROCYTE [DISTWIDTH] IN BLOOD BY AUTOMATED COUNT: 12.9 % (ref 11.5–14.5)
EST. GFR  (NO RACE VARIABLE): >60 ML/MIN/1.73 M^2
GLUCOSE SERPL-MCNC: 90 MG/DL (ref 70–110)
HCT VFR BLD AUTO: 39.7 % (ref 37–48.5)
HGB BLD-MCNC: 12.7 G/DL (ref 12–16)
IMM GRANULOCYTES # BLD AUTO: 0.03 K/UL (ref 0–0.04)
MCH RBC QN AUTO: 29.7 PG (ref 27–31)
MCHC RBC AUTO-ENTMCNC: 32 G/DL (ref 32–36)
MCV RBC AUTO: 93 FL (ref 82–98)
NEUTROPHILS # BLD AUTO: 4.9 K/UL (ref 1.8–7.7)
PLATELET # BLD AUTO: 451 K/UL (ref 150–450)
PMV BLD AUTO: 8.9 FL (ref 9.2–12.9)
POTASSIUM SERPL-SCNC: 4.6 MMOL/L (ref 3.5–5.1)
PROT SERPL-MCNC: 6.9 G/DL (ref 6–8.4)
RBC # BLD AUTO: 4.27 M/UL (ref 4–5.4)
SODIUM SERPL-SCNC: 143 MMOL/L (ref 136–145)
WBC # BLD AUTO: 7.34 K/UL (ref 3.9–12.7)

## 2025-02-11 PROCEDURE — 99215 OFFICE O/P EST HI 40 MIN: CPT | Mod: PBBFAC | Performed by: HOSPITALIST

## 2025-02-11 PROCEDURE — G2211 COMPLEX E/M VISIT ADD ON: HCPCS | Mod: S$PBB,,, | Performed by: HOSPITALIST

## 2025-02-11 PROCEDURE — 36415 COLL VENOUS BLD VENIPUNCTURE: CPT | Performed by: HOSPITALIST

## 2025-02-11 PROCEDURE — 80053 COMPREHEN METABOLIC PANEL: CPT | Performed by: HOSPITALIST

## 2025-02-11 PROCEDURE — 85027 COMPLETE CBC AUTOMATED: CPT | Performed by: HOSPITALIST

## 2025-02-11 PROCEDURE — 99215 OFFICE O/P EST HI 40 MIN: CPT | Mod: S$PBB,,, | Performed by: HOSPITALIST

## 2025-02-11 PROCEDURE — 99999 PR PBB SHADOW E&M-EST. PATIENT-LVL V: CPT | Mod: PBBFAC,,, | Performed by: HOSPITALIST

## 2025-02-11 NOTE — PROGRESS NOTES
[facility] MEDICAL ONCOLOGY - FOLLOW UP VISIT    Reason for visit: Stage IV adenocarcinoma of the lung      Oncology History   Adenocarcinoma of upper lobe of left lung   1/5/2023 Procedure    Bronchoscopy  JAZMYNE Biopsy  - Adenocarcinoma, (TTF-1 positive, p40 negative)    [place] NGS  - EGFR L858R mutation positive, VAF 34.8%  - PD-L1 5%     2/1/2023 Procedure    Attempted JAZMYNE Resection, Aborted for Pleural / Diaphragmatic Involvement  1. Diaphragm nodules, biopsy:                                               - Moderately differentiated adenocarcinoma.                           2. Parietal pleura, biopsies:                                               - Moderately differentiated adenocarcinoma.                           3. Pericardial nodule, biopsy:                                              - Moderately differentiated adenocarcinoma, see Comment.                 Comment; Immunohistochemistry was performed on tissue block 3A.         The adenocarcinoma is positive for TTF-1, Austin-EP4, cytokeratin 7        and Napsin-A. Calretinin, cytokeratin 20 and cytokeratin 5/6 are        negative. This immunophenotype supports pulmonary origin.              4. Diaphragm nodule, biopsy:                                                - Atypical TTF-1 positive cells, cannot exclude malignancy.           5. Left pleural fluid for cytology (ThinPrep, two cytospins, and           cell block):                                                             - Positive for malignant cells, consistent with metastatic             adenocarcinoma, pulmonary origin.                                         Comment; Immunohistochemistry was performed on cell block 5A.           The malignant cells are positive for Austin-EP4 and TTF-1.           [facility]  - EGFR L858R (VAF 46%), CTNNB1, CKS1B     9/8/2023 Cancer Staged    Staging form: Lung, AJCC 8th Edition  - Clinical stage from 9/8/2023:  Stage IV (ycT2, cN1, cM1)     2/28/2024 Imaging Significant Findings    CT C, Non-Con  - Interval development of extensive multifocal groundglass infiltrates in b/l upper lobes, lower lobes and right middle lobe   - 2.6 x 1.4 cm spiculated nodule in the left upper lobe with surrounding scarring, previously 3.0 x 1.7 cm   - 2 nodular infiltrates in the right lung base  - Stable sclerotic bone lesions in the thoracic spine consistent with osteoblastic metastasis      2/28/2024 - 3/1/2024 Hospital Admission    Admitted for SOB. Had not improved on doxycycline and prednisone. Started of levofloxacin.      3/22/2024 Imaging Significant Findings    CT C  - Unchanged 1.7 cm JAZMYNE lesion  - Interval reduction in ground-glass opacities, w/ residual architectural distortion and pleuroparenchymal scarring most compatible w/ sequela of prior viral pneumonitis  - Mild consolidation in the superior segment LLL  -      3/22/2024 Tumor Conference    [facility] Tumor Board  Difficult to determine extent of radiation vs drug-induced pneumonitis. There appear to be ground glass opacities outside of the treatment field, which would argue somewhat against a pure radiation pneumonitis.     Recommendations for Radiation Oncologists to do peer to peer with MD Rojas and for pt to follow up with Dr. Adams to discuss treatment options.        4/25/2024 -  Targeted Therapy    Erlotinib  - 150 mg daily     5/13/2024 Imaging Significant Findings    CT C  - Stable appearance of JAZMYNE spiculated mass w/ associated scarring anteriorly  - Additional increasing areas of fibrosis and pulmonary scarring including in the infrahilar region b/l extending posteriorly into lower lobes b/l, suggest changes secondary to localized radiation therapy  - No pathologic LN enlargement     7/15/2024 Imaging Significant Findings    MRI T Spine W/ and W/o  - osseous metastatic disease involving posterior aspect of T6 through T9, no pathologic compression fractures or  evidence of epidural involvement  - Multilevel mild neural foraminal stenosis throughout thoracic spine  - No significant spinal canal stenosis     8/5/2024 Imaging Significant Findings    CT C/A/P  - Stable spiculated JAZMYNE opacity  - Medial bilateral lower lobe fibrosis likely radiation induced more prominent  - No new pulmonary nodule or mass  - No mediastinal or hilar adenopathy  - Stable sclerotic bone lesions at T6, T8, T9     11/8/2024 Imaging Significant Findings    CT C/A/P  2.3 x 1.3 x 1.4 cm spiculated JAZMYNE nodule, stable  Bilateral medial infrahilar fibrosis with mild improvement of associated ground-glass opacities consistent with postradiation treatment  No new findings to suggest progression     2/10/2025 Imaging Significant Findings    CT C/A/P  Stable JAZMYNE bandlike/nodular opacity in JAZMYNE  No new pulmonary nodule / consolidation  No mediastinal or hilar LAD  Stable sclerotic foci at 5th R rib, T6, T8, T9     EGFR-related lung cancer   10/5/2023 Initial Diagnosis    EGFR-related lung cancer     10/6/2023 - 10/6/2023 Chemotherapy    Treatment Summary   Plan Name: OP OSIMERTINIB DAILY  Treatment Goal: Control  Status: Inactive  Start Date: 10/6/2023  End Date: 10/6/2023  Provider: Zenon Cheney MD  Chemotherapy: osimertinib (TAGRISSO) 80 mg Tab, 80 mg, Oral, Daily, 1 of 1 cycle, Start date: 10/6/2023, End date: 3/25/2024     3/22/2024 Tumor Conference    [facility] Tumor Board  Difficult to determine extent of radiation vs drug-induced pneumonitis. There appear to be ground glass opacities outside of the treatment field, which would argue somewhat against a pure radiation pneumonitis.     Recommendations for Radiation Oncologists to do peer to peer with MD Rojas and for pt to follow up with Dr. Adams to discuss treatment options.        8/5/2024 Imaging Significant Findings    CT C/A/P  - Stable spiculated JAZMYNE opacity  - Medial bilateral lower lobe fibrosis likely radiation induced more prominent  - No new  "pulmonary nodule or mass  - No mediastinal or hilar adenopathy  - Stable sclerotic bone lesions at T6, T8, T9        Per Dr. Fields, 8/28/23:   "Mrs. Hancock is a 62-year-old lady with a history that dates to late 2022 when she had upper respiratory infection. This prompted medical attention that resulted in a chest x-ray revealing a left upper lobe mass. This was followed by CT of the chest that confirmed a left upper lobe mass with additional satellite nodules of the lobe as well as nodules of the left pleura. She also had a nodule noted at the right base. She was sent for PET CT scan and ultimately had bronchoscopic biopsy. The left upper lobe was confirmed to be consistent with adenocarcinoma of lung primary.    She was sent to me for recommendations in preparation for an attempt at definitive surgery. Unfortunately, during her attempt for surgical resection, she was found to have metastatic disease to the pleural space with malignant involvement of pleural effusion.     She went on to complete 4 cycles of palliative chemotherapy with carboplatin, pemetrexed, and pembrolizumab with mixed CT findings resulting in recommendations for continuation of chemotherapy with pemetrexed and pembrolizumab. She has remained on pemetrexed and pembrolizumab for ongoing palliative therapy.     Patient presents to clinic in f/u for lung cancer prior to continuation of systemic therapy. She continues maintenance pemetrexed plus pembrolizumab. Since her previous appointment she did have dermatology evaluation of a rash who performed punch biopsy. She was initiated on steroid topical by dermatology with improvement. In the interim she also reported recent increase in frontal headaches that resulted in ordering of MRI of the brain. Other than above noted rash and headache she has continued to tolerate palliative Pembro plus pemetrexed well. She currently denies any pain. She denies any shortness of breath. She denies any nausea or " "vomiting. She denies any diarrhea or constipation.    1. Metastatic left lung adenocarcinoma: She has completed 4 cycles of palliative chemotherapy with carboplatin, pemetrexed, and pembrolizumab with mixed CT findings resulting in recommendations for continuation of chemotherapy with pemetrexed and pembrolizumab. She has continued pemetrexed plus pembrolizumab for maintenance palliative therapy with positive radiographic response. CT scan results discussed in detail with patient. Multiple questions were asked by patient during discussion of CT scan results, all of which were answered in detail to her apparent satisfaction. With continued positive radiographic response and without dermatologic evaluation suggest immune mediated skin reaction, recommendations made for patient to continue pemetrexed plus pembrolizumab for ongoing palliative maintenance therapy. Patient verbalized understanding and agrees with recommendations as made.    Once again discussed the need to refrain from the use of systemic steroid therapy with immunotherapy given the theoretical risk that systemic steroid could augment the T-cell response.     2. Rash - s/p punch biopsy of left distal pretibial region on 8/3/2023 pathologic consistent with spongiotic dermatitis with eosinophils; negative for medication reaction per dermatology. She will continue topical management per dermatology recommendations. "     Oncology History        HPI:     JACOBO COLEMAN is a 63 y.o. female with pmh significant for MDD, GERD, and hypothyroidism who presents for follow up of the below lung cancer. Pt was previously treated by Dr. Hossein Fields ([facility]) and Dr. Cheney.    1) Stage IV adenocarcinoma of the JAZMYNE w/ pleural involvement, malignant pleural effusion, and mets to the thoracic spine (EGFR L858R mutated, PD-L1 5%), initially dx'd 1/5/23, s/o carboplatin/pemetrexed/pembrolizumab x4 cycles followed by pemetrexed/pembroliuzmab maintenance " "(2/2023-8/2023, complicated by AEs), osimertinib (10/6/23 - 2/28/24; stopped for possible drug-induced pneumonitis), palliative XRT to T6-T9 vertebrae and adacent paravertebral L rib lesions (22.5 Gy/% Fx, EOT 1/18/24), and erlotinib (4/25/24 - present)     Last clinic 11/11/24, most recent imaging stable.  Patient would like to continue on current dose of erlotinib.  Repeat imaging in 3 months.    Interval History:  1/9/25: Pain medicine, plan for repeat L sided paravetebral block at T7/8.   1/14/25: Pulmonology, doing well, continue current therapy, RTC in 6 months  2/10/25: CT C/A/P, stable compared to prior    Pt states that she is doing well overall.     Regarding her NESBITT, she says "it varies, especially since I had the flu 2 weeks ago, I have not rebounded". She says that it is worse with bending over. Prior to the flu, however, she says that she was able to walk 3 miles; now she feels fatigued after walking to the end of the driveway.    She says that her diarrhea "varies from day to day". She says that she usually has 1 BM in the morning, and 4-5 in the evening; she confirms that this is worse than it was previously. She attributes this somewhat to the flu, and she says it was "pretty controlled" prior to that infection. She says she had maybe 1-2 episodes of diarrhea prior to the flu. She is using loperamide, but not daily ("when I have things to do").     She continues to endorse her chronic back pain, increased compared to prior. She is pending a nerve block, which has been rescheduled twice due to weather and her sickness.     She says that her energy "was really good, but since the flu, I sleep until about 8 in the morning and then I lounge around as I get myself in gear, and usually by this time in the day, I'm home and laying down".     She feels that her rash has improved compared to prior. She has "one spot on my right thigh that pops up".     She continues to have paronychia on the L third finger. " "She also developed blisters over some toes "out of the blue", but says this is improving.     She feels that her alopecia has stopped. She says that her eyelashes grew very long, such that she had to cut them.    She notes that she has had more nausea, but also relates this to the flu.     She says that her anxiety is worse, noting not only her cancer but also issues with her daughter and grandchild. She is not currently seeing anybody for this.      ROS:   As per HPI.       Physical Exam:       /66 (BP Location: Left arm, Patient Position: Sitting)   Pulse 87   Temp 97.2 °F (36.2 °C) (Temporal)   Ht 5' 10" (1.778 m)   Wt 77.4 kg (170 lb 11.9 oz)   SpO2 97%   BMI 24.50 kg/m²                Physical Exam  Constitutional:       Appearance: Normal appearance.   HENT:      Head: Normocephalic and atraumatic.   Eyes:      Extraocular Movements: Extraocular movements intact.      Conjunctiva/sclera: Conjunctivae normal.      Pupils: Pupils are equal, round, and reactive to light.   Cardiovascular:      Rate and Rhythm: Normal rate and regular rhythm.      Heart sounds: No murmur heard.     No friction rub. No gallop.   Pulmonary:      Effort: Pulmonary effort is normal.      Breath sounds: No wheezing, rhonchi or rales.   Musculoskeletal:         General: Normal range of motion.   Skin:     General: Skin is warm and dry.      Comments: Slight erythema over cheeks bilaterally, resolved rash over arms, chest, neck.   Neurological:      Mental Status: She is alert and oriented to person, place, and time.   Psychiatric:         Mood and Affect: Mood normal.         Thought Content: Thought content normal.         Judgment: Judgment normal.           Labs:   Recent Results (from the past 48 hours)   ISTAT CREATININE    Collection Time: 02/10/25 10:45 AM   Result Value Ref Range    POC Creatinine 1.1 0.5 - 1.4 mg/dL    Sample unknown    CBC Oncology    Collection Time: 02/11/25  1:26 PM   Result Value Ref Range    " WBC 7.34 3.90 - 12.70 K/uL    RBC 4.27 4.00 - 5.40 M/uL    Hemoglobin 12.7 12.0 - 16.0 g/dL    Hematocrit 39.7 37.0 - 48.5 %    MCV 93 82 - 98 fL    MCH 29.7 27.0 - 31.0 pg    MCHC 32.0 32.0 - 36.0 g/dL    RDW 12.9 11.5 - 14.5 %    Platelets 451 (H) 150 - 450 K/uL    MPV 8.9 (L) 9.2 - 12.9 fL    Gran # (ANC) 4.9 1.8 - 7.7 K/uL    Immature Grans (Abs) 0.03 0.00 - 0.04 K/uL   Comprehensive Metabolic Panel    Collection Time: 02/11/25  1:26 PM   Result Value Ref Range    Sodium 143 136 - 145 mmol/L    Potassium 4.6 3.5 - 5.1 mmol/L    Chloride 112 (H) 95 - 110 mmol/L    CO2 23 23 - 29 mmol/L    Glucose 90 70 - 110 mg/dL    BUN 21 8 - 23 mg/dL    Creatinine 1.0 0.5 - 1.4 mg/dL    Calcium 9.6 8.7 - 10.5 mg/dL    Total Protein 6.9 6.0 - 8.4 g/dL    Albumin 3.9 3.5 - 5.2 g/dL    Total Bilirubin 0.7 0.1 - 1.0 mg/dL    Alkaline Phosphatase 125 40 - 150 U/L    AST 27 10 - 40 U/L    ALT 32 10 - 44 U/L    eGFR >60 >60 mL/min/1.73 m^2    Anion Gap 8 8 - 16 mmol/L          Imaging:    See oncologic history above.     Path:  See oncologic history above.      Assessment and Plan:     JACOBO COLEMAN is a 63 y.o. female with pmh significant for MDD, GERD, and hypothyroidism who presents for follow up of the below lung cancer. Pt was previously treated by Dr. Hossein Fields ([facility]) and Dr. Cheney.    1) Stage IV adenocarcinoma of the JAZMYNE w/ pleural involvement, malignant pleural effusion, and mets to the thoracic spine (EGFR L858R mutated, PD-L1 5%), initially dx'd 1/5/23, s/o carboplatin/pemetrexed/pembrolizumab x4 cycles followed by pemetrexed/pembroliuzmab maintenance (2/2023-8/2023, complicated by AEs), osimertinib (10/6/23 - 2/28/24; stopped for possible drug-induced pneumonitis), palliative XRT to T6-T9 vertebrae and adacent paravertebral L rib lesions (22.5 Gy/% Fx, EOT 1/18/24), and erlotinib (4/25/24 - present)     Stage IV Adenocarcinoma of the JAZMYNE, EGFR L858R Mutated, PD-L1 5%  ECOG PS 1 (due to dyspnea,  improving).  Patient is currently on erlotinib, treatment complicated by acneiform rash, diarrhea, paronychia, and alopecia.  Having discussed these symptoms at length as below, patient previously stated she was not interested in a dose reduction or treatment holiday; many of these symptoms had significantly improved, however seem to have recrudesced after a recent infection with the flu.  Most recent imaging (CT C, 2/10/25, on erlotinib ~ 9 months) demonstrated stable appearance of the JAZMYNE spiculated mass, lack of mediastinal or hilar lymphadenopathy, and stable sclerotic bone lesions (T6, T8, T9).  Continue w/ q3m imaging, labs, and follow up until progression or intolerance. At that time, consider possible reversion to osimertinib (pending molecular workup) vs transition to amivantamab/carboplatin/pemetrexed per JINNY-2.     PLAN:   Continue erlotinib 150 mg daily, labs acceptable  CT C/A/P around 5/5/25  Labs w/ imaging, RTC at least 1 day after imaging  Pt prefers to follow at [facility]      Diarrhea  Related to erlotinib. Patient now using Imodium infrequently. See HPI for most recent description.   Continue imodium PRN  Consider lomotil should symptoms worsen      Paronychia  Paronychia now largely confined to L third finger (improved compared to initially). Previously denied interest in further evaluation by specialist, but is interested now.    Clobetasol 0.5%, 1-2 times daily avoiding areas of pustules  Avoid soaking fingers  Dove Sensitive Skin bar soap, apply Aquaphore 3-in-1 diaper rash cream with zinc after washing  Avoid alochol-based hand sanitizers as able  Dermatology referral in place      Anxiety  Pt endorses many active stressors in her life, including her cancer diagnosis as well as with her daughter.   Referral to onc psych in place, pt okay to drive to [place]      Mid-Back Pain  Patient with worsening mid back pain, tender to palpation over spinous processes as well as bilateral  paraspinous muscles (L>R).  She notes pain worsening throughout the day, and numbness in her R middle finger alone (not in her first or second fingers) which also worsened throughout the day and is improved with decompression .  Based on tenderness paraspinous muscles, may be muscular in origin, however notably patient did receive palliative radiation to metastatic disease in her T6-T9 vertebra.  In concert with potential radiculopathic symptoms, MRI T spine was obtained and was without evidence of compression fracture or evidence of epidural involvement / canal stenosis. Pain now markedly improved after T7/T8 paravertebral block.   Continue f/u w/ pain medicine, pending nerve block  Continue f/u w/ palliative care      Bony Mets  Consider zometa at next visit, noting stable and treated lesions      G1 EGFR Inhibitor-Induced Acneiform Rash  Patient initially developed an acneiform rash over her face, chest, and scalp.  It was mildly pruritic and tender. It covered ~10% of her body surface area.  Notably, she had not been using doxycycline or hydrocortisone prophylaxis as previously discussed.  Also notably, she was on steroids until 1 week prior which may have alleviated the initial symptoms.  Patient has since received clindamycin gel  and hydrocortisone 2.5%, used to moderate effect.  She continues to have some mild erythema over her cheeks b/l but no further pustular lesions.  Patient states she does not want take doxycycline as this often causes her to have an upset stomach.  Continue hydrocortisone 2.5% for areas of eruption  Continue clindamycin 1% gel as needed (pt notes she has clindamycin wipes)  Previously discussed using sun protection (SPF, wide brimmed hat, sun shirts)      Pneumonitis/Radiation Fibrosis  Dyspnea  See previous notes for description.  Most recent imaging demonstrates evolving likely radiation fibrosis.  Symptomatically, patient has improved in his no longer taking steroids.  She says  that she has her good days and bad days, but previously noted that her sats are consistently above 92%.  She still has some difficulty when bending over and in the heat.   CTM      Needs PCP  Referral to IM      The above information has been reviewed with the patient and all questions have been answered to their apparent satisfaction.  They understand that they can call the clinic with any questions.    Marin Adams MD  Hematology/Oncology  [facility]      Route Chart for Scheduling    Med Onc Chart Routing      Follow up with physician . CT C/A/P around 5/5/25. CBC and CMP with imaging. RTC at least 1 day after imaging. Everything at [facility].   Follow up with [facility]    Infusion scheduling note    Injection scheduling note    Labs    Imaging    Pharmacy appointment    Other referrals         Dermatology; onc psych; internal medicine               Disclaimer: This note was prepared using voice recognition system and is likely to have sound alike errors and is not proof read.  Please call me with any questions.

## 2025-02-12 DIAGNOSIS — C34.12 ADENOCARCINOMA OF UPPER LOBE OF LEFT LUNG: Primary | ICD-10-CM

## 2025-02-19 ENCOUNTER — PATIENT MESSAGE (OUTPATIENT)
Dept: HEMATOLOGY/ONCOLOGY | Facility: CLINIC | Age: 65
End: 2025-02-19
Payer: MEDICARE

## 2025-02-20 DIAGNOSIS — E11.9 CONTROLLED TYPE 2 DIABETES MELLITUS WITHOUT COMPLICATION, WITHOUT LONG-TERM CURRENT USE OF INSULIN: Primary | ICD-10-CM

## 2025-02-27 DIAGNOSIS — G89.12 POST-THORACOTOMY PAIN SYNDROME: Primary | ICD-10-CM

## 2025-03-06 ENCOUNTER — PATIENT MESSAGE (OUTPATIENT)
Dept: ADMINISTRATIVE | Facility: OTHER | Age: 65
End: 2025-03-06
Payer: MEDICARE

## 2025-03-10 ENCOUNTER — PATIENT MESSAGE (OUTPATIENT)
Dept: PAIN MEDICINE | Facility: CLINIC | Age: 65
End: 2025-03-10
Payer: MEDICARE

## 2025-03-10 NOTE — TELEPHONE ENCOUNTER
I returned the patient phone call and got her scheduled for her injection and I also made her follow up appointment as well.    Sharon CHAMORRO ([facility])

## 2025-03-20 ENCOUNTER — PATIENT MESSAGE (OUTPATIENT)
Dept: PAIN MEDICINE | Facility: CLINIC | Age: 65
End: 2025-03-20
Payer: MEDICARE

## 2025-04-04 DIAGNOSIS — C34.12 ADENOCARCINOMA OF UPPER LOBE OF LEFT LUNG: ICD-10-CM

## 2025-04-04 RX ORDER — ERLOTINIB 150 MG/1
150 TABLET, FILM COATED ORAL DAILY
Qty: 30 TABLET | Refills: 11 | Status: ACTIVE | OUTPATIENT
Start: 2025-04-04 | End: 2026-04-04

## 2025-04-08 ENCOUNTER — OFFICE VISIT (OUTPATIENT)
Dept: OPHTHALMOLOGY | Facility: CLINIC | Age: 65
End: 2025-04-08
Payer: MEDICARE

## 2025-04-08 DIAGNOSIS — H02.052 TRICHIASIS OF BOTH LOWER EYELIDS: ICD-10-CM

## 2025-04-08 DIAGNOSIS — H04.123 DRY EYES, BILATERAL: Primary | ICD-10-CM

## 2025-04-08 DIAGNOSIS — H02.834 DERMATOCHALASIS OF BOTH UPPER EYELIDS: ICD-10-CM

## 2025-04-08 DIAGNOSIS — H02.831 DERMATOCHALASIS OF BOTH UPPER EYELIDS: ICD-10-CM

## 2025-04-08 DIAGNOSIS — H02.055 TRICHIASIS OF BOTH LOWER EYELIDS: ICD-10-CM

## 2025-04-08 DIAGNOSIS — C34.12 ADENOCARCINOMA OF UPPER LOBE OF LEFT LUNG: ICD-10-CM

## 2025-04-08 PROCEDURE — 99213 OFFICE O/P EST LOW 20 MIN: CPT | Mod: PBBFAC | Performed by: STUDENT IN AN ORGANIZED HEALTH CARE EDUCATION/TRAINING PROGRAM

## 2025-04-08 PROCEDURE — 67820 REVISE EYELASHES: CPT | Mod: 50,S$PBB,, | Performed by: STUDENT IN AN ORGANIZED HEALTH CARE EDUCATION/TRAINING PROGRAM

## 2025-04-08 PROCEDURE — 99999 PR PBB SHADOW E&M-EST. PATIENT-LVL III: CPT | Mod: PBBFAC,,, | Performed by: STUDENT IN AN ORGANIZED HEALTH CARE EDUCATION/TRAINING PROGRAM

## 2025-04-08 PROCEDURE — 67820 REVISE EYELASHES: CPT | Mod: 50,PBBFAC | Performed by: STUDENT IN AN ORGANIZED HEALTH CARE EDUCATION/TRAINING PROGRAM

## 2025-04-08 PROCEDURE — 99214 OFFICE O/P EST MOD 30 MIN: CPT | Mod: 25,S$PBB,, | Performed by: STUDENT IN AN ORGANIZED HEALTH CARE EDUCATION/TRAINING PROGRAM

## 2025-04-08 NOTE — PROGRESS NOTES
HPI     Dry Eye     Additional comments: Pt reports for 6m dry eye follow up. Pt denies any   ocular pain, but states her eyes are still dry.              Comments    NP  Referral from Dr. Adams    Hx of Rk  [place] vision  OD near/ OD distance  Wearing Pro K lens          Last edited by Lizzie Barahona on 4/8/2025  3:27 PM.            Assessment /Plan     For exam results, see Encounter Report.    Dry eyes, bilateral- ATs QID and lid hygiene w/ baby shampoo  Patient was seen in [place] [facility] since her last visit and she gets prose there    Adenocarcinoma of upper lobe of left lung- Continue care with oncology    Dermatochalasis of both upper eyelids- Patient is undergoing immunotherapy and can not have any operations, gave contact info for Dr. ULICES Garcia when she is ready    Trichiasis, both lower lids- FORCEPS EPILATION PROCEDURE:    Pt has symptomatic trichiasis of the OU eyelid(s).  Risk, benefits and alternatives to cilia removal by forceps was reviewed and pt requested that this be performed at this time.  AkTaine gtts introduced into both eyes and cilia forceps, under slit lamp imagnification,  were used for epilation, removing the following number of cilia:  RUL: 0  JAZMYNE: 0  RLL: 15   LLL: 14          RTC 3M dry eye R/C and trichiasis check

## 2025-04-29 ENCOUNTER — PATIENT MESSAGE (OUTPATIENT)
Dept: PAIN MEDICINE | Facility: CLINIC | Age: 65
End: 2025-04-29
Payer: MEDICARE

## 2025-05-02 ENCOUNTER — PATIENT MESSAGE (OUTPATIENT)
Dept: ADMINISTRATIVE | Facility: OTHER | Age: 65
End: 2025-05-02
Payer: MEDICARE

## 2025-05-05 ENCOUNTER — HOSPITAL ENCOUNTER (OUTPATIENT)
Dept: RADIOLOGY | Facility: HOSPITAL | Age: 65
Discharge: HOME | End: 2025-05-05
Attending: HOSPITALIST
Payer: MEDICARE

## 2025-05-05 ENCOUNTER — RESULTS FOLLOW-UP (OUTPATIENT)
Dept: HEMATOLOGY/ONCOLOGY | Facility: CLINIC | Age: 65
End: 2025-05-05

## 2025-05-05 DIAGNOSIS — C34.12 ADENOCARCINOMA OF UPPER LOBE OF LEFT LUNG: ICD-10-CM

## 2025-05-05 PROCEDURE — 25500020 PHARM REV CODE 255: Performed by: HOSPITALIST

## 2025-05-05 PROCEDURE — 74177 CT ABD & PELVIS W/CONTRAST: CPT | Mod: 26,,, | Performed by: RADIOLOGY

## 2025-05-05 PROCEDURE — 71260 CT THORAX DX C+: CPT | Mod: 26,,, | Performed by: RADIOLOGY

## 2025-05-05 PROCEDURE — 71260 CT THORAX DX C+: CPT | Mod: TC

## 2025-05-05 RX ADMIN — IOHEXOL 100 ML: 350 INJECTION, SOLUTION INTRAVENOUS at 02:05

## 2025-05-06 ENCOUNTER — OFFICE VISIT (OUTPATIENT)
Dept: HEMATOLOGY/ONCOLOGY | Facility: CLINIC | Age: 65
End: 2025-05-06
Payer: MEDICARE

## 2025-05-06 VITALS
HEIGHT: 70 IN | HEART RATE: 82 BPM | OXYGEN SATURATION: 97 % | SYSTOLIC BLOOD PRESSURE: 113 MMHG | TEMPERATURE: 97 F | DIASTOLIC BLOOD PRESSURE: 71 MMHG | BODY MASS INDEX: 24.54 KG/M2 | WEIGHT: 171.44 LBS

## 2025-05-06 DIAGNOSIS — R19.7 DIARRHEA, UNSPECIFIED TYPE: ICD-10-CM

## 2025-05-06 DIAGNOSIS — C79.51 SECONDARY MALIGNANT NEOPLASM OF BONE: ICD-10-CM

## 2025-05-06 DIAGNOSIS — L03.012 PARONYCHIA OF FINGER OF LEFT HAND: ICD-10-CM

## 2025-05-06 DIAGNOSIS — J91.0 MALIGNANT PLEURAL EFFUSION: ICD-10-CM

## 2025-05-06 DIAGNOSIS — C34.12 ADENOCARCINOMA OF UPPER LOBE OF LEFT LUNG: Primary | ICD-10-CM

## 2025-05-06 PROCEDURE — 99214 OFFICE O/P EST MOD 30 MIN: CPT | Mod: PBBFAC | Performed by: HOSPITALIST

## 2025-05-06 PROCEDURE — G2211 COMPLEX E/M VISIT ADD ON: HCPCS | Mod: S$PBB,,, | Performed by: HOSPITALIST

## 2025-05-06 PROCEDURE — 99999 PR PBB SHADOW E&M-EST. PATIENT-LVL IV: CPT | Mod: PBBFAC,,, | Performed by: HOSPITALIST

## 2025-05-06 PROCEDURE — 99215 OFFICE O/P EST HI 40 MIN: CPT | Mod: S$PBB,,, | Performed by: HOSPITALIST

## 2025-05-06 NOTE — PROGRESS NOTES
[facility] MEDICAL ONCOLOGY - FOLLOW UP VISIT    Reason for visit: Stage IV adenocarcinoma of the lung      Oncology History   Adenocarcinoma of upper lobe of left lung   1/5/2023 Procedure    Bronchoscopy  JAZMYNE Biopsy  - Adenocarcinoma, (TTF-1 positive, p40 negative)    [facility]  - EGFR L858R mutation positive, VAF 34.8%  - PD-L1 5%     2/1/2023 Procedure    Attempted JAZMYNE Resection, Aborted for Pleural / Diaphragmatic Involvement  1. Diaphragm nodules, biopsy:                                               - Moderately differentiated adenocarcinoma.                           2. Parietal pleura, biopsies:                                               - Moderately differentiated adenocarcinoma.                           3. Pericardial nodule, biopsy:                                              - Moderately differentiated adenocarcinoma, see Comment.                 Comment; Immunohistochemistry was performed on tissue block 3A.         The adenocarcinoma is positive for TTF-1, Austin-EP4, cytokeratin 7        and Napsin-A. Calretinin, cytokeratin 20 and cytokeratin 5/6 are        negative. This immunophenotype supports pulmonary origin.              4. Diaphragm nodule, biopsy:                                                - Atypical TTF-1 positive cells, cannot exclude malignancy.           5. Left pleural fluid for cytology (ThinPrep, two cytospins, and           cell block):                                                             - Positive for malignant cells, consistent with metastatic             adenocarcinoma, pulmonary origin.                                         Comment; Immunohistochemistry was performed on cell block 5A.           The malignant cells are positive for Austin-EP4 and TTF-1.           [facility]  - EGFR L858R (VAF 46%), CTNNB1, CKS1B     9/8/2023 Cancer Staged    Staging form: Lung, AJCC 8th Edition  - Clinical stage from 9/8/2023:  Stage IV (ycT2, cN1, cM1)     2/28/2024 Imaging Significant Findings    CT C, Non-Con  - Interval development of extensive multifocal groundglass infiltrates in b/l upper lobes, lower lobes and right middle lobe   - 2.6 x 1.4 cm spiculated nodule in the left upper lobe with surrounding scarring, previously 3.0 x 1.7 cm   - 2 nodular infiltrates in the right lung base  - Stable sclerotic bone lesions in the thoracic spine consistent with osteoblastic metastasis      2/28/2024 - 3/1/2024 Hospital Admission    Admitted for SOB. Had not improved on doxycycline and prednisone. Started of levofloxacin.      3/22/2024 Imaging Significant Findings    CT C  - Unchanged 1.7 cm JAZMYNE lesion  - Interval reduction in ground-glass opacities, w/ residual architectural distortion and pleuroparenchymal scarring most compatible w/ sequela of prior viral pneumonitis  - Mild consolidation in the superior segment LLL  -      3/22/2024 Tumor Conference    [facility] Tumor Board  Difficult to determine extent of radiation vs drug-induced pneumonitis. There appear to be ground glass opacities outside of the treatment field, which would argue somewhat against a pure radiation pneumonitis.     Recommendations for Radiation Oncologists to do peer to peer with MD Rojas and for pt to follow up with Dr. Adams to discuss treatment options.        4/25/2024 -  Targeted Therapy    Erlotinib  - 150 mg daily     5/13/2024 Imaging Significant Findings    CT C  - Stable appearance of JAZMYNE spiculated mass w/ associated scarring anteriorly  - Additional increasing areas of fibrosis and pulmonary scarring including in the infrahilar region b/l extending posteriorly into lower lobes b/l, suggest changes secondary to localized radiation therapy  - No pathologic LN enlargement     7/15/2024 Imaging Significant Findings    MRI T Spine W/ and W/o  - osseous metastatic disease involving posterior aspect of T6 through T9, no pathologic compression fractures or  evidence of epidural involvement  - Multilevel mild neural foraminal stenosis throughout thoracic spine  - No significant spinal canal stenosis     8/5/2024 Imaging Significant Findings    CT C/A/P  - Stable spiculated JAZMYNE opacity  - Medial bilateral lower lobe fibrosis likely radiation induced more prominent  - No new pulmonary nodule or mass  - No mediastinal or hilar adenopathy  - Stable sclerotic bone lesions at T6, T8, T9     11/8/2024 Imaging Significant Findings    CT C/A/P  2.3 x 1.3 x 1.4 cm spiculated JAZMYNE nodule, stable  Bilateral medial infrahilar fibrosis with mild improvement of associated ground-glass opacities consistent with postradiation treatment  No new findings to suggest progression     2/10/2025 Imaging Significant Findings    CT C/A/P  Stable JAZMYNE bandlike/nodular opacity in JAZMYNE  No new pulmonary nodule / consolidation  No mediastinal or hilar LAD  Stable sclerotic foci at 5th R rib, T6, T8, T9     5/5/2025 Imaging Significant Findings    CT C/A/P  Stable JAZMYNE bandlike/nodular opacity  No new suspicious pulmonary nodules  No significant pleural effusion  No new/concerning mediastinal or hilar lymphadenopathy  Stable sclerotic foci and T6, T8, and T9     EGFR-related lung cancer   10/5/2023 Initial Diagnosis    EGFR-related lung cancer     10/6/2023 - 10/6/2023 Chemotherapy    Treatment Summary   Plan Name: OP OSIMERTINIB DAILY  Treatment Goal: Control  Status: Inactive  Start Date: 10/6/2023  End Date: 10/6/2023  Provider: Zenon Cheney MD  Chemotherapy: osimertinib (TAGRISSO) 80 mg Tab, 80 mg, Oral, Daily, 1 of 1 cycle, Start date: 10/6/2023, End date: 3/25/2024     3/22/2024 Tumor Conference    [facility] Tumor Board  Difficult to determine extent of radiation vs drug-induced pneumonitis. There appear to be ground glass opacities outside of the treatment field, which would argue somewhat against a pure radiation pneumonitis.     Recommendations for Radiation Oncologists to do peer to peer with MD  "Bob and for pt to follow up with Dr. Adams to discuss treatment options.        8/5/2024 Imaging Significant Findings    CT C/A/P  - Stable spiculated JAZMYNE opacity  - Medial bilateral lower lobe fibrosis likely radiation induced more prominent  - No new pulmonary nodule or mass  - No mediastinal or hilar adenopathy  - Stable sclerotic bone lesions at T6, T8, T9        Per Dr. Fields, 8/28/23:   "Mrs. Hancock is a 62-year-old lady with a history that dates to late 2022 when she had upper respiratory infection. This prompted medical attention that resulted in a chest x-ray revealing a left upper lobe mass. This was followed by CT of the chest that confirmed a left upper lobe mass with additional satellite nodules of the lobe as well as nodules of the left pleura. She also had a nodule noted at the right base. She was sent for PET CT scan and ultimately had bronchoscopic biopsy. The left upper lobe was confirmed to be consistent with adenocarcinoma of lung primary.    She was sent to me for recommendations in preparation for an attempt at definitive surgery. Unfortunately, during her attempt for surgical resection, she was found to have metastatic disease to the pleural space with malignant involvement of pleural effusion.     She went on to complete 4 cycles of palliative chemotherapy with carboplatin, pemetrexed, and pembrolizumab with mixed CT findings resulting in recommendations for continuation of chemotherapy with pemetrexed and pembrolizumab. She has remained on pemetrexed and pembrolizumab for ongoing palliative therapy.     Patient presents to clinic in f/u for lung cancer prior to continuation of systemic therapy. She continues maintenance pemetrexed plus pembrolizumab. Since her previous appointment she did have dermatology evaluation of a rash who performed punch biopsy. She was initiated on steroid topical by dermatology with improvement. In the interim she also reported recent increase in frontal " "headaches that resulted in ordering of MRI of the brain. Other than above noted rash and headache she has continued to tolerate palliative Pembro plus pemetrexed well. She currently denies any pain. She denies any shortness of breath. She denies any nausea or vomiting. She denies any diarrhea or constipation.    1. Metastatic left lung adenocarcinoma: She has completed 4 cycles of palliative chemotherapy with carboplatin, pemetrexed, and pembrolizumab with mixed CT findings resulting in recommendations for continuation of chemotherapy with pemetrexed and pembrolizumab. She has continued pemetrexed plus pembrolizumab for maintenance palliative therapy with positive radiographic response. CT scan results discussed in detail with patient. Multiple questions were asked by patient during discussion of CT scan results, all of which were answered in detail to her apparent satisfaction. With continued positive radiographic response and without dermatologic evaluation suggest immune mediated skin reaction, recommendations made for patient to continue pemetrexed plus pembrolizumab for ongoing palliative maintenance therapy. Patient verbalized understanding and agrees with recommendations as made.    Once again discussed the need to refrain from the use of systemic steroid therapy with immunotherapy given the theoretical risk that systemic steroid could augment the T-cell response.     2. Rash - s/p punch biopsy of left distal pretibial region on 8/3/2023 pathologic consistent with spongiotic dermatitis with eosinophils; negative for medication reaction per dermatology. She will continue topical management per dermatology recommendations. "     Oncology History        HPI:     JACOBO COLEMAN is a 63 y.o. female with pmh significant for MDD, GERD, and hypothyroidism who presents for follow up of the below lung cancer. Pt was previously treated by Dr. Hossein Fields ([facility]) and Dr. Cheney.    1) Stage IV " "adenocarcinoma of the JAZMYNE w/ pleural involvement, malignant pleural effusion, and mets to the thoracic spine (EGFR L858R mutated, PD-L1 5%), initially dx'd 1/5/23, s/o carboplatin/pemetrexed/pembrolizumab x4 cycles followed by pemetrexed/pembroliuzmab maintenance (2/2023-8/2023, complicated by AEs), osimertinib (10/6/23 - 2/28/24; stopped for possible drug-induced pneumonitis), palliative XRT to T6-T9 vertebrae and adacent paravertebral L rib lesions (22.5 Gy/% Fx, EOT 1/18/24), and erlotinib (4/25/24 - present)     Last clinic 2/11/25, most recent imaging with stable appearance compared to prior, continue lorlatinib 150 mg daily, CT C/A/P around 5/5/25 and RTC afterwards.  Continue Imodium PRN..    Interval History:  4/18/25: Dermatology, culture to rule out infectious process.  Medial and lateral edges of the affected nail removed.  Begin betamethasone, begin linezolid.  5/2/25:  Dermatology finger healing well, continue daily vinegar soaks, b.i.d. application of betamethasone  5/6/25: CT C/A/P, stable compared to prior    The pt states that she is not doing well today, but says that she is on the mend.     She says it has been very rough, dealing with oral abx and the paronychia (as above). She says that the linezolid in particular was very difficult to tolerate, stating that she slept for most of the day. That said, she feels that her 3rd L finger has been helping.     She notes that she has pain over her flank and back, and says that she is going to be getting a steroid injection in 2 weeks; this was initially postponed due to the paronychia.     She says that her diarrhea is controlled with diet, and says that she will take lomotil "when I have plans".     She notes some sores over lips, but otherwise largely denies a rash.     She feels that her alopecia has resolved, and says that her hair is starting to come back.     She feels that her memory is not as good as it used to be.     She says "I get Ravi " "Horses in muscles I never knew existed".     The pt states that she never heard from psychology, but says that she is no longer interested and does not feel that this would be useful.     ROS:   As per HPI.       Physical Exam:       /71 (BP Location: Left arm, Patient Position: Sitting)   Pulse 82   Temp 97 °F (36.1 °C) (Temporal)   Ht 5' 10" (1.778 m)   Wt 77.7 kg (171 lb 6.5 oz)   SpO2 97%   BMI 24.59 kg/m²                Physical Exam  Constitutional:       Appearance: Normal appearance.   HENT:      Head: Normocephalic and atraumatic.   Eyes:      Extraocular Movements: Extraocular movements intact.      Conjunctiva/sclera: Conjunctivae normal.      Pupils: Pupils are equal, round, and reactive to light.   Cardiovascular:      Rate and Rhythm: Normal rate and regular rhythm.      Heart sounds: No murmur heard.     No friction rub. No gallop.   Pulmonary:      Effort: Pulmonary effort is normal.      Breath sounds: No wheezing, rhonchi or rales.   Musculoskeletal:         General: Normal range of motion.   Skin:     General: Skin is warm and dry.      Comments: Erythema over left middle nailbed   Neurological:      Mental Status: She is alert and oriented to person, place, and time.   Psychiatric:         Mood and Affect: Mood normal.         Thought Content: Thought content normal.         Judgment: Judgment normal.           Labs:   Recent Results (from the past 48 hours)   CBC Oncology    Collection Time: 05/05/25 12:08 PM   Result Value Ref Range    WBC 7.78 3.90 - 12.70 K/uL    RBC 4.00 4.00 - 5.40 M/uL    HGB 12.0 12.0 - 16.0 gm/dL    HCT 36.8 (L) 37.0 - 48.5 %    MCV 92 82 - 98 fL    MCH 30.0 27.0 - 31.0 pg    MCHC 32.6 32.0 - 36.0 g/dL    RDW 13.6 11.5 - 14.5 %    Platelet Count 316 150 - 450 K/uL    MPV 8.8 (L) 9.2 - 12.9 fL    Neut # 4.64 1.8 - 7.7 K/uL    Imm Grans # 0.07 (H) 0.00 - 0.04 K/uL   Comprehensive Metabolic Panel    Collection Time: 05/05/25 12:08 PM   Result Value Ref Range    " Sodium 143 136 - 145 mmol/L    Potassium 4.4 3.5 - 5.1 mmol/L    Chloride 115 (H) 95 - 110 mmol/L    CO2 20 (L) 23 - 29 mmol/L    Glucose 94 70 - 110 mg/dL    BUN 25 (H) 8 - 23 mg/dL    Creatinine 1.0 0.5 - 1.4 mg/dL    Calcium 8.7 8.7 - 10.5 mg/dL    Protein Total 6.9 6.0 - 8.4 gm/dL    Albumin 3.8 3.5 - 5.2 g/dL    Bilirubin Total 0.5 0.1 - 1.0 mg/dL     40 - 150 unit/L    AST 36 11 - 45 unit/L    ALT 36 10 - 44 unit/L    Anion Gap 8 8 - 16 mmol/L    eGFR >60 >60 mL/min/1.73/m2   Hemoglobin A1c    Collection Time: 05/05/25 12:08 PM   Result Value Ref Range    Hemoglobin A1c 5.8 (H) 4.0 - 5.6 %    Estimated Average Glucose 120 68 - 131 mg/dL          Imaging:    See oncologic history above.     Path:  See oncologic history above.      Assessment and Plan:     JACOBO COLEMAN is a 63 y.o. female with pmh significant for MDD, GERD, and hypothyroidism who presents for follow up of the below lung cancer. Pt was previously treated by Dr. Hossein Fields ([facility]) and Dr. Cheney.    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  Patient is currently on erlotinib, treatment complicated by acneiform rash, diarrhea, paronychia, and alopecia.  Most recent imaging (CT C, 5/5/25, on erlotinib ~ 12 months) demonstrated stable appearance of the JAZMYNE bandlike opacity, lack of mediastinal or hilar lymphadenopathy, and stable  sclerotic bone lesions (T6, T8, T9).  Regarding side effects, we have multiple times discussed at length the option to dose reduce or offer a treatment holiday.  Today (5/6/25), she endorses her interest in a dose reduction, which is appropriate given how invasive the symptoms are into her everyday life. Therefore, will continue erlotinib on a dose reduction w/ q3m imaging, labs, and follow up until progression or intolerance. At that time, consider possible reversion to osimertinib (pending molecular workup) vs transition to amivantamab/carboplatin/pemetrexed per JINNY-2.     PLAN:   Continue erlotinib, will dose reduce. Anticipate dose reduction to 100 mg daily, however pt is curious about alternating between 150/100; will discuss with specialty pharmacy  CT C/A/P  in 3 months  Labs w/ imaging, RTC at least 1 day after imaging  Pt prefers to follow at [facility]      Diarrhea  Related to erlotinib. Patient now using Imodium and lomotil infrequently. See HPI for most recent description.   Continue anti-diarrheals PRN      Paronychia  Paronychia now largely confined to L third finger (improved compared to initially), however waxed and waned in severity. Now s/p excision of lateral nailbed and multiple rounds of abx w/ subsequent improvement.   Continued management per dermatology  Dose reduction of erlotinib as above      Mid-Back Pain  Patient with worsening mid back pain, tender to palpation over spinous processes as well as bilateral paraspinous muscles (L>R).  Previously noted pain worsening throughout the day, and numbness in her R middle finger alone (not in her first or second fingers) which also worsened throughout the day and is improved with decompression .  Based on tendernessover  paraspinous muscles, may be muscular in origin, however notably patient did receive palliative radiation to metastatic disease in her T6-T9 vertebra.  In concert with potential radiculopathic symptoms, MRI T spine was obtained  and was without evidence of compression fracture or evidence of epidural involvement / canal stenosis. Pain initially markedly improved after T7/T8 paravertebral block, however has returned.   Continue f/u w/ pain medicine, pending nerve block in 2 weeks (initially postponed due to paronychia)  Continue f/u w/ palliative care      Bony Mets  Consider zometa at next visit, noting stable and treated lesions      G1 EGFR Inhibitor-Induced Acneiform Rash  Patient initially developed an acneiform rash over her face, chest, and scalp.  It was mildly pruritic and tender. It covered ~10% of her body surface area.  Notably, she had not been using doxycycline or hydrocortisone prophylaxis as previously discussed.  Also notably, she was on steroids until 1 week prior which may have alleviated the initial symptoms.  Patient has since received clindamycin gel  and hydrocortisone 2.5%, used to moderate effect.  She continues to have some mild erythema over her cheeks b/l but no further pustular lesions.  Patient states she does not want take doxycycline as this often causes her to have an upset stomach.  Continue hydrocortisone 2.5% for areas of eruption  Continue clindamycin 1% gel as needed (pt notes she has clindamycin wipes)  Previously discussed using sun protection (SPF, wide brimmed hat, sun shirts)      Pneumonitis/Radiation Fibrosis  Dyspnea  See previous notes for description.  Most recent imaging demonstrates evolving likely radiation fibrosis.  Symptomatically, patient has improved in his no longer taking steroids.  She says that she has her good days and bad days, but previously noted that her sats are consistently above 92%.  She still has some difficulty when bending over and in the heat.   CTM      The above information has been reviewed with the patient and all questions have been answered to their apparent satisfaction.  They understand that they can call the clinic with any questions.    Marin Adams,  MD  Hematology/Oncology  [facility]      Route Chart for Scheduling    Med Onc Chart Routing      Follow up with physician .  CT C/A/P  in 3 months  Labs w/ imaging, RTC at least 1 day after imaging  Pt prefers to follow at [facility]   Follow up with REB[facility]    Infusion scheduling note    Injection scheduling note    Labs CBC and CMP   Scheduling:  Preferred lab:  Lab interval:     Imaging    Pharmacy appointment    Other referrals                    Disclaimer: This note was prepared using voice recognition system and is likely to have sound alike errors and is not proof read.  Please call me with any questions.

## 2025-05-06 NOTE — Clinical Note
Afternoon!  Pt would like to dose reduce her erlotinib for tolerance, but wants to know if she can alternate between 150 and 100 every other day.  I told her I anticipate that it will need to be 100 mg daily, but wanted to discuss with you first.   Also, messaging you because this also was one of Kd's patients, please let me know if I need to message somebody else!  Thanks!

## 2025-05-07 ENCOUNTER — PATIENT MESSAGE (OUTPATIENT)
Dept: HEMATOLOGY/ONCOLOGY | Facility: CLINIC | Age: 65
End: 2025-05-07
Payer: MEDICARE

## 2025-05-07 DIAGNOSIS — C34.12 ADENOCARCINOMA OF UPPER LOBE OF LEFT LUNG: ICD-10-CM

## 2025-05-07 RX ORDER — ERLOTINIB 100 MG/1
100 TABLET, FILM COATED ORAL EVERY OTHER DAY
Qty: 30 TABLET | Refills: 5 | Status: ACTIVE | OUTPATIENT
Start: 2025-05-07

## 2025-05-07 RX ORDER — ERLOTINIB 150 MG/1
150 TABLET, FILM COATED ORAL EVERY OTHER DAY
Qty: 30 TABLET | Refills: 5 | Status: ACTIVE | OUTPATIENT
Start: 2025-05-07 | End: 2026-05-07

## 2025-05-07 RX ORDER — ERLOTINIB 150 MG/1
150 TABLET, FILM COATED ORAL EVERY OTHER DAY
Qty: 30 TABLET | Refills: 5 | Status: SHIPPED | OUTPATIENT
Start: 2025-05-07 | End: 2025-05-07

## 2025-05-07 RX ORDER — ERLOTINIB 100 MG/1
100 TABLET, FILM COATED ORAL EVERY OTHER DAY
Qty: 30 TABLET | Refills: 5 | Status: SHIPPED | OUTPATIENT
Start: 2025-05-07 | End: 2025-05-07

## 2025-05-07 NOTE — PROGRESS NOTES
Discussed with pharmacy, [facility] to trial alternating 150 mg and 100 mg. Will send prescriptions for every other day for each strength.

## 2025-05-09 DIAGNOSIS — C34.12 ADENOCARCINOMA OF UPPER LOBE OF LEFT LUNG: Primary | ICD-10-CM

## 2025-05-13 NOTE — PRE-PROCEDURE INSTRUCTIONS
Spoke with patient regarding procedure scheduled on 5.20     Arrival time 0945     Has patient been sick with fever or on antibiotics within the last 7 days? No     Does the patient have any open wounds, sores or rashes? No     Does the patient have any recent fractures? no     Has patient received a vaccination within the last 7 days? No     Received the COVID vaccination?     Has the patient stopped all medications as directed? na     Does patient have a pacemaker, defibrillator, or implantable stimulator? No     Does the patient have a ride to and from procedure and someone reliable to remain with patient?  natalie     Is the patient diabetic? no      Does the patient have sleep apnea? Or use O2 at home? no     Is the patient receiving sedation?      Is the patient instructed to remain NPO beginning at midnight the night before their procedure? yes     Procedure location confirmed with patient? Yes     Covid- Denies signs/symptoms. Instructed to notify PAT/MD if any changes.

## 2025-05-20 ENCOUNTER — TELEPHONE (OUTPATIENT)
Dept: HEMATOLOGY/ONCOLOGY | Facility: CLINIC | Age: 65
End: 2025-05-20
Payer: MEDICARE

## 2025-05-20 ENCOUNTER — PATIENT MESSAGE (OUTPATIENT)
Dept: HEMATOLOGY/ONCOLOGY | Facility: CLINIC | Age: 65
End: 2025-05-20
Payer: MEDICARE

## 2025-05-20 ENCOUNTER — HOSPITAL ENCOUNTER (OUTPATIENT)
Facility: HOSPITAL | Age: 65
Discharge: HOME | End: 2025-05-20
Attending: PHYSICAL MEDICINE & REHABILITATION | Admitting: PHYSICAL MEDICINE & REHABILITATION
Payer: MEDICARE

## 2025-05-20 VITALS
BODY MASS INDEX: 24.49 KG/M2 | HEIGHT: 70 IN | DIASTOLIC BLOOD PRESSURE: 78 MMHG | OXYGEN SATURATION: 97 % | SYSTOLIC BLOOD PRESSURE: 141 MMHG | RESPIRATION RATE: 16 BRPM | HEART RATE: 70 BPM | WEIGHT: 171.06 LBS | TEMPERATURE: 98 F

## 2025-05-20 DIAGNOSIS — G89.12 POST-THORACOTOMY PAIN: Primary | ICD-10-CM

## 2025-05-20 PROCEDURE — 64461 PVB THORACIC SINGLE INJ SITE: CPT | Mod: LT | Performed by: PHYSICAL MEDICINE & REHABILITATION

## 2025-05-20 PROCEDURE — 63600175 PHARM REV CODE 636 W HCPCS: Performed by: PHYSICAL MEDICINE & REHABILITATION

## 2025-05-20 PROCEDURE — 64490 INJ PARAVERT F JNT C/T 1 LEV: CPT | Mod: LT | Performed by: PHYSICAL MEDICINE & REHABILITATION

## 2025-05-20 PROCEDURE — 64461 PVB THORACIC SINGLE INJ SITE: CPT | Mod: LT,,, | Performed by: PHYSICAL MEDICINE & REHABILITATION

## 2025-05-20 PROCEDURE — 25500020 PHARM REV CODE 255: Performed by: PHYSICAL MEDICINE & REHABILITATION

## 2025-05-20 RX ORDER — BUPIVACAINE HYDROCHLORIDE 2.5 MG/ML
INJECTION, SOLUTION EPIDURAL; INFILTRATION; INTRACAUDAL; PERINEURAL
Status: DISCONTINUED | OUTPATIENT
Start: 2025-05-20 | End: 2025-05-20 | Stop reason: HOSPADM

## 2025-05-20 RX ORDER — FENTANYL CITRATE 50 UG/ML
INJECTION, SOLUTION INTRAMUSCULAR; INTRAVENOUS
Status: DISCONTINUED | OUTPATIENT
Start: 2025-05-20 | End: 2025-05-20 | Stop reason: HOSPADM

## 2025-05-20 RX ORDER — MIDAZOLAM HYDROCHLORIDE 1 MG/ML
INJECTION, SOLUTION INTRAMUSCULAR; INTRAVENOUS
Status: DISCONTINUED | OUTPATIENT
Start: 2025-05-20 | End: 2025-05-20 | Stop reason: HOSPADM

## 2025-05-20 RX ORDER — DEXAMETHASONE SODIUM PHOSPHATE 10 MG/ML
INJECTION, SOLUTION INTRA-ARTICULAR; INTRALESIONAL; INTRAMUSCULAR; INTRAVENOUS; SOFT TISSUE
Status: DISCONTINUED | OUTPATIENT
Start: 2025-05-20 | End: 2025-05-20 | Stop reason: HOSPADM

## 2025-05-20 RX ORDER — ONDANSETRON HYDROCHLORIDE 2 MG/ML
4 INJECTION, SOLUTION INTRAVENOUS ONCE AS NEEDED
Status: DISCONTINUED | OUTPATIENT
Start: 2025-05-20 | End: 2025-05-20 | Stop reason: HOSPADM

## 2025-05-20 NOTE — H&P
"HPI  Patient presenting for Procedure(s) (LRB):  Paravertebral block at T7-8 (left) (Left)     No health changes since previous encounter    Past Medical History:   Diagnosis Date    Malignant neoplasm of upper lobe of left lung 01/19/2023    Post-radiation pneumonitis 07/10/2024    Thyroid disease      Past Surgical History:   Procedure Laterality Date    BLOCK, NERVE, GENICULAR N/A 2/27/2025    Procedure: Paravertebral block at T7-8;  Surgeon: Thad Mario MD;  Location: [facility];  Service: Pain Management;  Laterality: N/A;    CHOLECYSTECTOMY      ENDOBRONCHIAL ULTRASOUND Left 1/5/2023    Procedure: ENDOBRONCHIAL ULTRASOUND (EBUS);  Surgeon: Bam Catalan MD;  Location: [facility];  Service: Pulmonary;  Laterality: Left;    HYSTERECTOMY      INJECTION OF FACET JOINT Left 10/3/2024    Procedure: Paravertebral block at T7-8;  Surgeon: Thad Mario MD;  Location: [facility];  Service: Pain Management;  Laterality: Left;    NAVIGATIONAL BRONCHOSCOPY Left 1/5/2023    Procedure: BRONCHOSCOPY, NAVIGATIONAL;  Surgeon: Bam Catalan MD;  Location: [facility];  Service: Pulmonary;  Laterality: Left;     Review of patient's allergies indicates:   Allergen Reactions    Xylocaine with epinephrine [lidocaine-epinephrine] Anaphylaxis    Lipitor [atorvastatin] Other (See Comments)     Body aches      Methocarbamol-aspirin     Zetia [ezetimibe] Other (See Comments)     Muscle spasms      Augmentin [amoxicillin-pot clavulanate] Rash    Macrodantin [nitrofurantoin macrocrystal] Rash    Omnicef [cefdinir] Rash and Other (See Comments)     GI upset    Pravastatin Rash    Sulfa (sulfonamide antibiotics) Rash        Medications Ordered Prior to Encounter[1]     PMHx, PSHx, Allergies, Medications reviewed in epic    ROS negative except pain complaints in HPI    OBJECTIVE:    /84 (BP Location: Right arm, Patient Position: Sitting)   Pulse 74   Temp 97.4 °F (36.3 °C) (Temporal)   Resp 16   Ht 5' 10" " (1.778 m)   Wt 77.6 kg (171 lb 1.2 oz)   SpO2 98%   Breastfeeding No   BMI 24.55 kg/m²     PHYSICAL EXAMINATION:    GENERAL: Well appearing, in no acute distress, alert and oriented x3.  PSYCH:  Mood and affect appropriate.  SKIN: Skin color, texture, turgor normal, no rashes or lesions which will impact the procedure.  CV: RRR with palpation of the radial artery.  PULM: No evidence of respiratory difficulty, symmetric chest rise. Clear to auscultation.  NEURO: Cranial nerves grossly intact.    Plan:    Proceed with procedure as planned Procedure(s) (LRB):  Paravertebral block at T7-8 (left) (Left)    Thad Mario MD  05/20/2025                 [1]   No current facility-administered medications on file prior to encounter.     Current Outpatient Medications on File Prior to Encounter   Medication Sig Dispense Refill    levothyroxine (SYNTHROID) 88 MCG tablet Take 88 mcg by mouth every evening.      albuterol (VENTOLIN HFA) 90 mcg/actuation inhaler Inhale 2 puffs into the lungs every 6 (six) hours as needed for Wheezing. Rescue 18 g 3    albuterol-ipratropium (DUO-NEB) 2.5 mg-0.5 mg/3 mL nebulizer solution Take 3 mLs by nebulization every 6 (six) hours as needed for Wheezing or Shortness of Breath. Rescue 300 mL 11    azelastine (ASTELIN) 137 mcg (0.1 %) nasal spray 1 spray by Nasal route 2 (two) times daily.      budesonide-glycopyr-formoterol 160-9-4.8 mcg/actuation HFAA Inhale 2 puffs into the lungs 2 (two) times daily. Wash out mouth after use 10.7 g 11    cetirizine (ZYRTEC) 10 MG tablet Take 5 mg by mouth once daily.      clindamycin phosphate 1% (CLINDAGEL) 1 % gel Apply topically 2 (two) times daily. 60 g 0    doxepin (SINEQUAN) 100 MG capsule       doxycycline (VIBRAMYCIN) 100 MG Cap Take 1 capsule (100 mg total) by mouth 2 (two) times daily. 60 capsule 1    eszopiclone (LUNESTA) 3 mg Tab Take 3 mg by mouth every evening.      famotidine (PEPCID) 20 MG tablet Take 1 tablet (20 mg total) by mouth 2  (two) times daily as needed for Heartburn. 60 tablet 1    fluticasone propionate (FLONASE) 50 mcg/actuation nasal spray 1 spray by Each Nostril route 2 (two) times daily.      hydrocortisone 2.5 % cream Apply topically 2 (two) times daily. 453.6 g 0    ipratropium (ATROVENT) 21 mcg (0.03 %) nasal spray SMARTSI Spray(s) Both Nares 2-3 Times Daily      multivitamin capsule Take 1 capsule by mouth once daily.      mupirocin (BACTROBAN) 2 % ointment Apply topically 3 (three) times daily.      ondansetron (ZOFRAN-ODT) 4 MG TbDL Take 2 mg by mouth every 6 (six) hours as needed.      predniSONE (DELTASONE) 10 MG tablet Take 1 tablet (10 mg total) by mouth once daily. 30 tablet 1    promethazine (PHENERGAN) 25 MG tablet Take 25 mg by mouth every 6 (six) hours as needed.

## 2025-05-20 NOTE — TELEPHONE ENCOUNTER
Prior to sending [facility] message, attempted to reach patient via phone; no answer. Left voicemail with brief nature of the call (schedule psychology referral) and phone number to call back.

## 2025-05-20 NOTE — DISCHARGE INSTRUCTIONS

## 2025-05-20 NOTE — DISCHARGE SUMMARY
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      albuterol (VENTOLIN HFA) 90 mcg/actuation inhaler Inhale 2 puffs into the lungs every 6 (six) hours as needed for Wheezing. Rescue  Qty: 18 g, Refills: 3    Associated Diagnoses: Chronic bronchitis, mucopurulent      albuterol-ipratropium (DUO-NEB) 2.5 mg-0.5 mg/3 mL nebulizer solution Take 3 mLs by nebulization every 6 (six) hours as needed for Wheezing or Shortness of Breath. Rescue  Qty: 300 mL, Refills: 11    Associated Diagnoses: Chronic bronchitis, mucopurulent; Simple chronic bronchitis      azelastine (ASTELIN) 137 mcg (0.1 %) nasal spray 1 spray by Nasal route 2 (two) times daily.      budesonide-glycopyr-formoterol 160-9-4.8 mcg/actuation HFAA Inhale 2 puffs into the lungs 2 (two) times daily. Wash out mouth after use  Qty: 10.7 g, Refills: 11    Associated Diagnoses: Chronic bronchitis, mucopurulent; Simple chronic bronchitis      cetirizine (ZYRTEC) 10 MG tablet Take 5 mg by mouth once daily.      doxepin (SINEQUAN) 100 MG capsule       doxycycline (VIBRAMYCIN) 100 MG Cap Take 1 capsule (100 mg total) by mouth 2 (two) times daily.  Qty: 60 capsule, Refills: 1    Associated Diagnoses: Adenocarcinoma of upper lobe of left lung      !! erlotinib (TARCEVA) 100 MG tablet Take 1 tablet (100 mg total) by mouth every other day Alternate with 150 mg erlotinib every other day.  Qty: 30 tablet, Refills: 5    Associated Diagnoses: Adenocarcinoma of  upper lobe of left lung      !! erlotinib (TARCEVA) 150 MG tablet Take 1 tablet (150 mg total) by mouth every other day Alternate with 100 mg erlotinib every other day..  Qty: 30 tablet, Refills: 5    Associated Diagnoses: Adenocarcinoma of upper lobe of left lung      eszopiclone (LUNESTA) 3 mg Tab Take 3 mg by mouth every evening.      famotidine (PEPCID) 20 MG tablet Take 1 tablet (20 mg total) by mouth 2 (two) times daily as needed for Heartburn.  Qty: 60 tablet, Refills: 1    Associated Diagnoses: Gastroesophageal reflux disease, unspecified whether esophagitis present      fluticasone propionate (FLONASE) 50 mcg/actuation nasal spray 1 spray by Each Nostril route 2 (two) times daily.      hydrocortisone 2.5 % cream Apply topically 2 (two) times daily.  Qty: 453.6 g, Refills: 0    Associated Diagnoses: Acneiform rash      ipratropium (ATROVENT) 21 mcg (0.03 %) nasal spray SMARTSI Spray(s) Both Nares 2-3 Times Daily      multivitamin capsule Take 1 capsule by mouth once daily.      mupirocin (BACTROBAN) 2 % ointment Apply topically 3 (three) times daily.      ondansetron (ZOFRAN-ODT) 4 MG TbDL Take 2 mg by mouth every 6 (six) hours as needed.      predniSONE (DELTASONE) 10 MG tablet Take 1 tablet (10 mg total) by mouth once daily.  Qty: 30 tablet, Refills: 1    Associated Diagnoses: Radiation pneumonitis      promethazine (PHENERGAN) 25 MG tablet Take 25 mg by mouth every 6 (six) hours as needed.       !! - Potential duplicate medications found. Please discuss with provider.        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  Activity: as per instruction sheet.  Discharge to: Home with a responsible adult.  Follow up: 2-4 weeks       Please call the office at  [phone] if you experience any weakness or loss of sensation, fever > 101.5, pain uncontrolled with oral medications, persistent nausea/vomiting/or diarrhea, redness or drainage from the incisions, or any other worrisome concerns. If physician on call was not reached or could not communicate with our office for any reason please go to the nearest emergency department

## 2025-05-20 NOTE — OP NOTE
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 No recent antibiotics or infections.  A time-out was taken to verify the correct patient, procedure, laterality, and appropriate medications/allergies.    Paravertberal Block:   The procedure and possible complications of pneumothorax, bleeding, infection, and nerve damage were fully explained to the patient. NIBP, O2 saturation, and EKG were monitored continuously.       Patient was placed in the prone position with blanket under the chest and both arms by the side above the head. Left posterior chest wall was prepped with CHG x 3 and draped. Sterile precautions observed throughout the procedure. Using fluoroscopic guidance the left T7 transverse process was identified and marked to the left of midline.  Xylocaine 1% was infiltrated locally over the rib. A 22-gauge spinal needle was introduced at a slight cephalad angle and the transverse process was encountered. Needle was walked off inferiorly under the transverse process and advanced 1.25 cm. 10 cc of a mixture of 9mL bupivacaine 0.25% with dexamethasone 10 mg was injected.  The needle was removed and a sterile bandage applied.     The patient was monitored after the procedure. Patient tolerated the procedure well without any complications.  No shortness of breath. O2 saturation maintained. Patient was discharged in the company of a responsible adult.      Future Management:   If helpful, can repeat as needed.    Follow up with my clinic in 4 weeks or sooner if needed

## 2025-06-14 ENCOUNTER — OFFICE VISIT (OUTPATIENT)
Dept: URGENT CARE | Facility: CLINIC | Age: 65
End: 2025-06-14
Payer: MEDICARE

## 2025-06-14 VITALS
HEIGHT: 70 IN | TEMPERATURE: 99 F | RESPIRATION RATE: 18 BRPM | SYSTOLIC BLOOD PRESSURE: 130 MMHG | BODY MASS INDEX: 24.48 KG/M2 | OXYGEN SATURATION: 95 % | DIASTOLIC BLOOD PRESSURE: 66 MMHG | HEART RATE: 105 BPM | WEIGHT: 171 LBS

## 2025-06-14 DIAGNOSIS — R06.2 WHEEZING: ICD-10-CM

## 2025-06-14 DIAGNOSIS — Z79.69 IMMUNODEFICIENCY DUE TO CHEMOTHERAPY: ICD-10-CM

## 2025-06-14 DIAGNOSIS — R09.81 NASAL CONGESTION: Primary | ICD-10-CM

## 2025-06-14 DIAGNOSIS — D84.821 IMMUNODEFICIENCY DUE TO CHEMOTHERAPY: ICD-10-CM

## 2025-06-14 DIAGNOSIS — C34.12 ADENOCARCINOMA OF UPPER LOBE OF LEFT LUNG: ICD-10-CM

## 2025-06-14 DIAGNOSIS — T45.1X5A IMMUNODEFICIENCY DUE TO CHEMOTHERAPY: ICD-10-CM

## 2025-06-14 DIAGNOSIS — R05.8 PRODUCTIVE COUGH: ICD-10-CM

## 2025-06-14 DIAGNOSIS — R53.83 FATIGUE, UNSPECIFIED TYPE: ICD-10-CM

## 2025-06-14 DIAGNOSIS — R50.9 LOW GRADE FEVER: ICD-10-CM

## 2025-06-14 LAB
CTP QC/QA: YES
SARS-COV+SARS-COV-2 AG RESP QL IA.RAPID: NEGATIVE

## 2025-06-14 PROCEDURE — 87811 SARS-COV-2 COVID19 W/OPTIC: CPT | Mod: QW,S$GLB,, | Performed by: NURSE PRACTITIONER

## 2025-06-14 PROCEDURE — 99204 OFFICE O/P NEW MOD 45 MIN: CPT | Mod: S$GLB,,, | Performed by: NURSE PRACTITIONER

## 2025-06-14 RX ORDER — IPRATROPIUM BROMIDE 21 UG/1
2 SPRAY, METERED NASAL 3 TIMES DAILY
Qty: 30 ML | Refills: 0 | Status: SHIPPED | OUTPATIENT
Start: 2025-06-14 | End: 2025-06-21

## 2025-06-14 RX ORDER — CLINDAMYCIN HYDROCHLORIDE 300 MG/1
300 CAPSULE ORAL EVERY 6 HOURS
Qty: 28 CAPSULE | Refills: 0 | Status: SHIPPED | OUTPATIENT
Start: 2025-06-14 | End: 2025-06-21

## 2025-06-14 NOTE — PATIENT INSTRUCTIONS
BRONCHITIS TREATMENT:    Take antibiotic as instructed  Complete the taper of steroids.  Continue using your nasal spray.  Use your duoneb ever 4-6 hours for cough/wheezing.  Rest. Dont let yourself get overly tired when you go back to your activities.  Stay away from cigarette smoke - yours or other peoples.  You may use acetaminophen or ibuprofen to control fever or pain,   Your appetite may be poor, so a light diet is fine.  Drink 6 to 8 glasses of fluids every day to make sure you are getting enough fluids.   Take cough medication and decongestant as instructed.       - You must understand that you have received an Urgent Care treatment only and that you may be released before all of your medical problems are known or treated.   - You, the patient, will arrange for follow up care as instructed with your primary care provider or recommended specialist.   - If your condition worsens or fails to improve we recommend that you receive another evaluation at the ER immediately or contact your PCP to discuss your concerns, or return here.   - Please do not drive or make any important decisions for 24 hours if you have received any pain medications, sedatives or mood altering drugs during your visit.

## 2025-06-14 NOTE — PROGRESS NOTES
"Subjective:      Patient ID: JACOBO COLEMAN is a 64 y.o. female.    Vitals:  height is 5' 10" (1.778 m) and weight is 77.6 kg (171 lb). Her tympanic temperature is 99.3 °F (37.4 °C). Her blood pressure is 130/66 and her pulse is 105. Her respiration is 18 and oxygen saturation is 95%.     Chief Complaint: Sinus Problem    Pt presents with nasal congestion, chills, cough, wheezing onset Monday. Pt took otc nasal congestions, prednisone and nebulizer with mild relief.     Sinus Problem  This is a new problem. The problem has been gradually worsening since onset. There has been no fever. She is experiencing no pain. Associated symptoms include chills, congestion, coughing, sneezing, a sore throat and swollen glands. Pertinent negatives include no diaphoresis, ear pain, headaches, hoarse voice, neck pain, shortness of breath or sinus pressure. Past treatments include nasal decongestants. The treatment provided no relief.       Constitution: Positive for chills, fatigue and fever. Negative for sweating.   HENT:  Positive for congestion and sore throat. Negative for ear pain, postnasal drip and sinus pressure.    Neck: Negative for neck pain.   Respiratory:  Positive for cough, sputum production and wheezing. Negative for shortness of breath and stridor.    Gastrointestinal:  Negative for nausea and vomiting.   Allergic/Immunologic: Positive for immunocompromised state and sneezing.   Neurological:  Negative for headaches.      Objective:     Physical Exam   Constitutional: She is oriented to person, place, and time. She appears well-developed. She is cooperative.  Non-toxic appearance. She does not appear ill. No distress.   HENT:   Head: Normocephalic and atraumatic.   Ears:   Right Ear: Hearing and external ear normal.   Left Ear: Hearing and external ear normal.   Nose: Nose normal. No mucosal edema, rhinorrhea or nasal deformity. No epistaxis. Right sinus exhibits no maxillary sinus tenderness and no frontal sinus " tenderness. Left sinus exhibits no maxillary sinus tenderness and no frontal sinus tenderness.   Mouth/Throat: Uvula is midline, oropharynx is clear and moist and mucous membranes are normal. No trismus in the jaw. Normal dentition. No uvula swelling. No oropharyngeal exudate, posterior oropharyngeal edema or posterior oropharyngeal erythema.   Eyes: Conjunctivae and lids are normal. No scleral icterus.   Neck: Trachea normal and phonation normal. Neck supple. No edema present. No erythema present. No neck rigidity present.   Cardiovascular: Normal rate, regular rhythm, normal heart sounds and normal pulses.   Pulmonary/Chest: Effort normal. No respiratory distress. She has no decreased breath sounds. She has wheezes (moderate bilateral) in the right upper field and the left upper field. She has no rhonchi. She has no rales.   Abdominal: Normal appearance.   Musculoskeletal: Normal range of motion.         General: No deformity. Normal range of motion.   Neurological: She is alert and oriented to person, place, and time. She exhibits normal muscle tone. Coordination normal.   Skin: Skin is warm, dry, intact, not diaphoretic and not pale.   Psychiatric: Her speech is normal and behavior is normal. Judgment and thought content normal.   Nursing note and vitals reviewed.      Assessment:     1. Nasal congestion    2. Productive cough    3. Wheezing    4. Immunodeficiency due to chemotherapy    5. Adenocarcinoma of upper lobe of left lung    6. Fatigue, unspecified type    7. Low grade fever        Plan:       Nasal congestion  -     SARS Coronavirus 2 Antigen, POCT Manual Read    Productive cough  -     SARS Coronavirus 2 Antigen, POCT Manual Read    Wheezing  -     SARS Coronavirus 2 Antigen, POCT Manual Read    Immunodeficiency due to chemotherapy    Adenocarcinoma of upper lobe of left lung    Fatigue, unspecified type    Low grade fever    Other orders  -     ipratropium (ATROVENT) 21 mcg (0.03 %) nasal spray; 2  sprays by Each Nostril route 3 (three) times daily. for 7 days  Dispense: 30 mL; Refill: 0  -     clindamycin (CLEOCIN) 300 MG capsule; Take 1 capsule (300 mg total) by mouth every 6 (six) hours. for 7 days  Dispense: 28 capsule; Refill: 0          Medical Decision Making:   Initial Assessment:   After complete evaluation, including thorough history and physical exam, the patient's symptoms are most likely due to upper respiratory infection with high probability of bacterial cause due to immunocompromised state and history of lung cancer. . There are no concerning features on physical exam to suggest bacterial otitis media/externa, sinusitis, strep pharyngitis, or peritonsillar abscess. Vital signs do not suggest sepsis. Lung sounds are clear and not consistent with pneumonia. There is no neck pain or limited ROM to suggest retropharyngeal abscess or meningitis. In clinic testing for covid is negative.The patient will be treated with supportive care. Will provide RX for clindamycin due to allergy profile and recent antibiotic useage upon D/C. Follow up instructions and ED precautions provided.     Patient has history of respiratory disease (copd/asthma/BRENDA/CPaP use) noted to have increase risk of pneumonia (bacterial infection) development with acute respiratory illness. Patient was advised close monitoring and is to continue current medications including inhalers. Patient to follow up with PCP and given strict ER precautions.             Results for orders placed or performed in visit on 06/14/25   SARS Coronavirus 2 Antigen, POCT Manual Read    Collection Time: 06/14/25  1:06 PM   Result Value Ref Range    SARS Coronavirus 2 Antigen Negative Negative, Presumptive Negative     Acceptable Yes      Patient Instructions   BRONCHITIS TREATMENT:    Take antibiotic as instructed  Complete the taper of steroids.  Continue using your nasal spray.  Use your duoneb ever 4-6 hours for cough/wheezing.  Rest.  Dont let yourself get overly tired when you go back to your activities.  Stay away from cigarette smoke - yours or other peoples.  You may use acetaminophen or ibuprofen to control fever or pain,   Your appetite may be poor, so a light diet is fine.  Drink 6 to 8 glasses of fluids every day to make sure you are getting enough fluids.   Take cough medication and decongestant as instructed.       - You must understand that you have received an Urgent Care treatment only and that you may be released before all of your medical problems are known or treated.   - You, the patient, will arrange for follow up care as instructed with your primary care provider or recommended specialist.   - If your condition worsens or fails to improve we recommend that you receive another evaluation at the ER immediately or contact your PCP to discuss your concerns, or return here.   - Please do not drive or make any important decisions for 24 hours if you have received any pain medications, sedatives or mood altering drugs during your visit.

## 2025-06-17 ENCOUNTER — OFFICE VISIT (OUTPATIENT)
Dept: PAIN MEDICINE | Facility: CLINIC | Age: 65
End: 2025-06-17
Payer: MEDICARE

## 2025-06-17 ENCOUNTER — TELEPHONE (OUTPATIENT)
Dept: URGENT CARE | Facility: CLINIC | Age: 65
End: 2025-06-17
Payer: MEDICARE

## 2025-06-17 DIAGNOSIS — G89.12 POST-THORACOTOMY PAIN: Primary | ICD-10-CM

## 2025-06-17 DIAGNOSIS — G89.3 CANCER RELATED PAIN: ICD-10-CM

## 2025-06-17 PROCEDURE — 98004 SYNCH AUDIO-VIDEO EST SF 10: CPT | Mod: 95,,, | Performed by: PHYSICIAN ASSISTANT

## 2025-06-17 PROCEDURE — G2211 COMPLEX E/M VISIT ADD ON: HCPCS | Mod: 95,,, | Performed by: PHYSICIAN ASSISTANT

## 2025-06-17 NOTE — PROGRESS NOTES
Chronic Pain-Tele-Medicine-Established Note (Follow up visit)    The patient location is: [place]  The chief complaint leading to consultation is: injection follow up    Visit type: audiovisual    Face to Face time with patient: 5-10 minutes of total time spent on the encounter, which includes face to face time and non-face to face time preparing to see the patient (eg, review of tests), Obtaining and/or reviewing separately obtained history, Documenting clinical information in the electronic or other health record, Independently interpreting results (not separately reported) and communicating results to the patient/family/caregiver, or Care coordination (not separately reported).         Each patient to whom he or she provides medical services by telemedicine is:  (1) informed of the relationship between the physician and patient and the respective role of any other health care provider with respect to management of the patient; and (2) notified that he or she may decline to receive medical services by telemedicine and may withdraw from such care at any time.    Notes:     SUBJECTIVE:  Interval History (6/17/2025):  JACOBO COLEMAN presents today for follow-up visit.  she underwent Left paravertebral block at T7/8   on 5/20/25.  The patient reports that she is/was better following the procedure.  she reports 60-70% pain relief.  The changes have continued through this visit.  Patient reports pain as 3/10 today.  Patient states her pain usually increases in the afternoons. At this time, she does not report any new symptoms.   Continues to take 800 mg Ibuprofen as needed. Has tried tylenol in the past but did not notice any relief.    Interval History (01/09/2025):   JACOBO COLEMAN presents today for follow-up visit.  Patient was last seen on 11/4/2024. For the past month, she has started to experience the same, exact pain she had prior to the paravertebral block last year. Patient states her pain is localized to  "Left "rib" area.  Patient reports pain as 5/10 today, however towards the end of the day it is a 9/10. She is interested in repeat block.     Patient was utilizing Ibuprofen and Tylenol but had to discontinue due to nose bleeds.    Interval HPI 11/4/2024:  JACOBO COLEMAN is a 64 y.o. female who presents today for follow-up injection after receiving a left-sided paravertebral block at T7-8 on 10/03/2024.  She reports 95% relief with this injection and has sustained relief at this time.  She was very pleased with the results of this injection in his requiring minimal amounts of analgesics for her off and on pain.  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    Patient denies night fever/night sweats, urinary incontinence, bowel incontinence, significant weight loss, significant motor weakness, and loss of sensations.    Pain Disability Index Review:      9/19/2024     3:18 PM   Last 3 PDI Scores   Pain Disability Index (PDI) 35     Initial HPI 09/19/2024:  JACOBO COLEMAN is a 63 y.o. female who presents to the clinic for the evaluation of back and leg pain.  She was referred by her primary care provider for further evaluation and management of this pain.  She has past medical history of anxiety, lung cancer s/p chemotherapy and radiation, hypercholesterolemia, immunodeficiency, DM2, hypothyroidism, and multiple other medical comorbidities as listed in her chart.  The pain started about 2 years ago following metastatic spread to or thoracic spine her primary lung cancer and also subsequent vats procedure and symptoms have been unchanged.The pain is located in the left midthoracic area and radiates to the left anterior and lateral chest wall.  The pain is described as sharp, stabbing, aching, dull and is rated as 5/10. The pain is rated with a score of  5/10 on the BEST day and a score of 9/10 " on the WORST day.  Symptoms interfere with daily activity. The pain is exacerbated by movement.  The pain is mitigated by changing her positions and medications. Employment status:  Nurse, currently working in hospice care      Non-Pharmacologic Treatments:  Physical Therapy/Home Exercise: yes  Ice/Heat:yes  TENS: no  Acupuncture: no  Massage: yes  Chiropractic: no    Other: no      Pain Medications:  - Opioids:  Tramadol  - Adjuvant Medications:  Celebrex, Lunesta, fluoxetine, NSAIDs, Medrol Cory  - Anti-Coagulants:  None     report:  Reviewed and consistent with medication use as prescribed.    Pain Procedures:   -10/03/2024:  Left T7/8 paravertebral block, 95% relief for 3 months  -05/20/2025: Repeat Left T7/8 paravertebral block, 60-70% relief  Imaging:   CT chest abdomen pelvis 08/05/2024:   Chest: Stable spiculated opacity in the anterior left upper lobe with adjacent architectural distortion..  Medial bilateral lower lobe fibrosis likely radiation-induced more prominent on today's exam.  No new pulmonary nodule or mass.     No mediastinal or hilar adenopathy.  Heart is normal in size without pericardial effusion.  Noncalcified coronary arteries.  Osseous structures are intact.  Sclerotic bone lesions T6, T8 and T9 are stable.  No pathologic fracture.  No new metastasis.        Abdomen/pelvis: Liver and spleen unremarkable.  Stable left renal cyst.  No obstructive uropathy.  Adrenal glands and pancreas within normal limits.  Gallbladder surgically absent.     Diverticulosis of the sigmoid colon.  Nonobstructive bowel gas pattern.  No adenopathy.  No suspicious osseous lesions.  No free air free fluid.    MRI thoracic spine 07/15/2024:  Alignment: Within normal limits.     Vertebrae: Thoracic vertebral body heights are maintained.  Abnormal sclerotic appearing foci with STIR signal within the predominantly posterior aspects of the T6, T7, T8 and T9 vertebral bodies as demonstrated on prior CTs.  Multilevel  endplate irregularity/degenerative change appears most pronounced at T9-T10 with minimal associated endplate edema.  No evidence of an acute fracture.     Discs: Disc desiccation throughout the thoracic spine.  Multilevel height loss is most pronounced at T9-T10.     Cord: No cord signal abnormality.     Enhancement: Faint associated enhancement of the involved T6, T7, T8 and T9 vertebral bodies.  There appears to be incomplete fat suppression of the post contrasted series involving the proximal thoracic spine from T1 through T4 limiting evaluation for enhancement in that region.  No abnormal epidural or intrathecal enhancement.     Degenerative findings: Minor posterior thoracic disc bulges at T3-T4 and T4-T5.  Additional minor left paracentral T9-T10 disc bulge.  No ventral cord contact or spinal canal stenosis.  Mild multilevel facet arthropathy contributing to mild right-sided T9-T10 and mild left-sided T2-T3, T3-T4, T9-T10 and T10-T11 neural foraminal stenosis.  There are several perineural root sleeve cysts, the largest on the left at T7-T8.     Paraspinal muscles & soft tissues: Paraspinal soft tissues appear within normal limits.  Consolidative opacities in the lung as best demonstrated on prior CT.        PMHx,PSHx, Social history, and Family history:  I have reviewed the patient's medical, surgical, social, and family history in detail and updated the computerized patient record.          Review of patient's allergies indicates:   Allergen Reactions    Xylocaine with epinephrine [lidocaine-epinephrine] Anaphylaxis    Lipitor [atorvastatin] Other (See Comments)     Body aches      Methocarbamol-aspirin     Zetia [ezetimibe] Other (See Comments)     Muscle spasms      Augmentin [amoxicillin-pot clavulanate] Rash    Macrodantin [nitrofurantoin macrocrystal] Rash    Omnicef [cefdinir] Rash and Other (See Comments)     GI upset    Pravastatin Rash    Sulfa (sulfonamide antibiotics) Rash       Current Outpatient  Medications   Medication Sig    albuterol (VENTOLIN HFA) 90 mcg/actuation inhaler Inhale 2 puffs into the lungs every 6 (six) hours as needed for Wheezing. Rescue    albuterol-ipratropium (DUO-NEB) 2.5 mg-0.5 mg/3 mL nebulizer solution Take 3 mLs by nebulization every 6 (six) hours as needed for Wheezing or Shortness of Breath. Rescue    azelastine (ASTELIN) 137 mcg (0.1 %) nasal spray 1 spray by Nasal route 2 (two) times daily.    budesonide-glycopyr-formoterol 160-9-4.8 mcg/actuation HFAA Inhale 2 puffs into the lungs 2 (two) times daily. Wash out mouth after use    cetirizine (ZYRTEC) 10 MG tablet Take 5 mg by mouth once daily.    clindamycin (CLEOCIN) 300 MG capsule Take 1 capsule (300 mg total) by mouth every 6 (six) hours. for 7 days    doxepin (SINEQUAN) 100 MG capsule     doxycycline (VIBRAMYCIN) 100 MG Cap Take 1 capsule (100 mg total) by mouth 2 (two) times daily.    erlotinib (TARCEVA) 100 MG tablet Take 1 tablet (100 mg total) by mouth every other day Alternate with 150 mg erlotinib every other day.    erlotinib (TARCEVA) 150 MG tablet Take 1 tablet (150 mg total) by mouth every other day Alternate with 100 mg erlotinib every other day..    eszopiclone (LUNESTA) 3 mg Tab Take 3 mg by mouth every evening.    famotidine (PEPCID) 20 MG tablet Take 1 tablet (20 mg total) by mouth 2 (two) times daily as needed for Heartburn.    fluticasone propionate (FLONASE) 50 mcg/actuation nasal spray 1 spray by Each Nostril route 2 (two) times daily.    hydrocortisone 2.5 % cream Apply topically 2 (two) times daily.    ipratropium (ATROVENT) 21 mcg (0.03 %) nasal spray 2 sprays by Each Nostril route 3 (three) times daily. for 7 days    levothyroxine (SYNTHROID) 88 MCG tablet Take 88 mcg by mouth every evening.    multivitamin capsule Take 1 capsule by mouth once daily.    mupirocin (BACTROBAN) 2 % ointment Apply topically 3 (three) times daily.    ondansetron (ZOFRAN-ODT) 4 MG TbDL Take 2 mg by mouth every 6 (six) hours  as needed.    predniSONE (DELTASONE) 10 MG tablet Take 1 tablet (10 mg total) by mouth once daily.    promethazine (PHENERGAN) 25 MG tablet Take 25 mg by mouth every 6 (six) hours as needed.     No current facility-administered medications for this visit.       Review of Systems     GENERAL:  No weight loss, malaise or fevers.  HEENT:   No recent changes in vision or hearing  NECK:  Negative for lumps, no difficulty with swallowing.  RESPIRATORY:  Negative for cough, wheezing or shortness of breath, patient denies any recent URI.  CARDIOVASCULAR:  Negative for chest pain, leg swelling or palpitations.  GI:  Negative for abdominal discomfort, blood in stools or black stools or change in bowel habits.  MUSCULOSKELETAL:  See HPI.  SKIN:  Negative for lesions, rash, and itching.  PSYCH:  No mood disorder or recent psychosocial stressors.  Patients sleep is not disturbed secondary to pain.  HEMATOLOGY/LYMPHOLOGY:  Negative for prolonged bleeding, bruising easily or swollen nodes.  Patient is not currently taking any anti-coagulants  NEURO:   No history of headaches, syncope, paralysis, seizures or tremors.  All other reviewed and negative other than HPI.    OBJECTIVE:    Telemedicine Physical Exam:   There were no vitals filed for this visit.  There is no height or weight on file to calculate BMI.   (reviewed on 6/17/2025)     (Physical exam performed virtually with patient guided on specific actions and diagnostic maneuvers)  GENERAL: Well appearing, in no acute distress, alert and oriented x3.  Cooperative.  PSYCH:  Mood and affect appropriate.  SKIN: Skin color & texture with no obvious abnormalities.    HEAD/FACE:  Normocephalic, atraumatic.    PULM:  No difficulty breathing. No nasal flaring. No obvious wheezing.  EXTREMITIES: No obvious deformities. Moving all extremities well, appears to have symmetric strength throughout.  MUSCULOSKELETAL: No obvious atrophy abnormalities are noted.   NEURO: No obvious neurologic  deficit.   GAIT: sitting.     Physical Exam: last in clinic visit:      Physical Exam    Physical exam:  GENERAL: Well appearing, in no acute distress, alert and oriented x3.    PSYCH:  Mood and affect appropriate.  SKIN: Skin color, texture, turgor normal, no rashes or lesions.  HEAD/FACE:  Normocephalic, atraumatic. Cranial nerves grossly intact.  CV:  No peripheral edema noted  PULM:  No difficulty breathing             LABS:  Lab Results   Component Value Date    WBC 7.78 05/05/2025    HGB 12.0 05/05/2025    HCT 36.8 (L) 05/05/2025    MCV 92 05/05/2025     05/05/2025       CMP  Sodium   Date Value Ref Range Status   05/05/2025 143 136 - 145 mmol/L Final   02/11/2025 143 136 - 145 mmol/L Final     Potassium   Date Value Ref Range Status   05/05/2025 4.4 3.5 - 5.1 mmol/L Final   02/11/2025 4.6 3.5 - 5.1 mmol/L Final     Chloride   Date Value Ref Range Status   05/05/2025 115 (H) 95 - 110 mmol/L Final   02/11/2025 112 (H) 95 - 110 mmol/L Final     CO2   Date Value Ref Range Status   05/05/2025 20 (L) 23 - 29 mmol/L Final   02/11/2025 23 23 - 29 mmol/L Final     Glucose   Date Value Ref Range Status   05/05/2025 94 70 - 110 mg/dL Final   02/11/2025 90 70 - 110 mg/dL Final     BUN   Date Value Ref Range Status   05/05/2025 25 (H) 8 - 23 mg/dL Final     Creatinine   Date Value Ref Range Status   05/05/2025 1.0 0.5 - 1.4 mg/dL Final     Calcium   Date Value Ref Range Status   05/05/2025 8.7 8.7 - 10.5 mg/dL Final   02/11/2025 9.6 8.7 - 10.5 mg/dL Final     Protein Total   Date Value Ref Range Status   05/05/2025 6.9 6.0 - 8.4 gm/dL Final     Total Protein   Date Value Ref Range Status   02/11/2025 6.9 6.0 - 8.4 g/dL Final     Albumin   Date Value Ref Range Status   05/05/2025 3.8 3.5 - 5.2 g/dL Final   02/11/2025 3.9 3.5 - 5.2 g/dL Final     Total Bilirubin   Date Value Ref Range Status   02/11/2025 0.7 0.1 - 1.0 mg/dL Final     Comment:     For infants and newborns, interpretation of results should be based  on  gestational age, weight and in agreement with clinical  observations.    Premature Infant recommended reference ranges:  Up to 24 hours.............<8.0 mg/dL  Up to 48 hours............<12.0 mg/dL  3-5 days..................<15.0 mg/dL  6-29 days.................<15.0 mg/dL       Bilirubin Total   Date Value Ref Range Status   05/05/2025 0.5 0.1 - 1.0 mg/dL Final     Comment:     For infants and newborns, interpretation of results should be based   on gestational age, weight and in agreement with clinical   observations.    Premature Infant recommended reference ranges:   0-24 hours:  <8.0 mg/dL   24-48 hours: <12.0 mg/dL   3-5 days:    <15.0 mg/dL   6-29 days:   <15.0 mg/dL     Alkaline Phosphatase   Date Value Ref Range Status   02/11/2025 125 40 - 150 U/L Final     ALP   Date Value Ref Range Status   05/05/2025 129 40 - 150 unit/L Final     AST   Date Value Ref Range Status   05/05/2025 36 11 - 45 unit/L Final   02/11/2025 27 10 - 40 U/L Final     ALT   Date Value Ref Range Status   05/05/2025 36 10 - 44 unit/L Final   02/11/2025 32 10 - 44 U/L Final     Anion Gap   Date Value Ref Range Status   05/05/2025 8 8 - 16 mmol/L Final       Lab Results   Component Value Date    HGBA1C 5.8 (H) 05/05/2025     ASSESSMENT: 64 y.o. year old female with midback pain, consistent with     1. Post-thoracotomy pain        2. Cancer related pain            PLAN:   - Interventions:  None at this time.    - S/p repeat Left sided Paravertebral block at T7/8 on 5/20/25 with 60-70% relief  - S/p left-sided paravertebral block at T7/8 on 10/03/2024 with 95% relief    - Anticoagulation use:  None    - Medications: I have stressed the importance of physical activity and a home exercise plan to help with pain and improve health. and Patient can continue with medications for now since they are providing benefits, using them appropriately, and without side effects.            - Continue Ibuprofen 800 mg daily as needed.    -- Can take  Tylenol (acetaminophen) 1000mg (two tablets of 500mg) 3x per day as needed for pain (to take up to max dose of 3000mg per day).    - Therapy:  Continue with activities as tolerated.     - Psychological:  Discussed coping mechanisms to help address chronic pain issues    - Labs:  Reviewed    - Imaging: Reviewed available imaging with patient and answered any questions they had regarding study.    - Consults/Referrals:  None at this time    - Records:  Reviewed/Obtain old records from outside physicians and imaging    - Follow up visit: return to clinic  as needed. Patient will message or call when ready to repeat block in the future.    - Counseled patient regarding the importance of activity modification and physical therapy    - This condition does not require this patient to take time off of work, and the primary goal of our Pain Management services is to improve the patient's functional capacity.    - Patient Questions: Answered all of the patient's questions regarding diagnosis, therapy, and treatment        The above plan and management options were discussed at length with patient. Patient is in agreement with the above and verbalized understanding.      Vanna Vizcarra PA-C  Interventional Pain Management  [facility]    Visit today included increased complexity associated with the care of the episodic problem of chronic pain which was addressed and continue to manage the longitudinal care of the patient due to the serious and/or complex managed problem(s) listed above.

## 2025-07-16 ENCOUNTER — OFFICE VISIT (OUTPATIENT)
Dept: PULMONOLOGY | Facility: CLINIC | Age: 65
End: 2025-07-16
Payer: MEDICARE

## 2025-07-16 ENCOUNTER — PATIENT MESSAGE (OUTPATIENT)
Dept: HEMATOLOGY/ONCOLOGY | Facility: CLINIC | Age: 65
End: 2025-07-16
Payer: MEDICARE

## 2025-07-16 ENCOUNTER — OFFICE VISIT (OUTPATIENT)
Dept: OPHTHALMOLOGY | Facility: CLINIC | Age: 65
End: 2025-07-16
Payer: MEDICARE

## 2025-07-16 VITALS
DIASTOLIC BLOOD PRESSURE: 80 MMHG | SYSTOLIC BLOOD PRESSURE: 111 MMHG | BODY MASS INDEX: 25.03 KG/M2 | WEIGHT: 174.81 LBS | HEART RATE: 72 BPM | OXYGEN SATURATION: 96 % | HEIGHT: 70 IN | RESPIRATION RATE: 12 BRPM

## 2025-07-16 DIAGNOSIS — H02.052 TRICHIASIS OF BOTH LOWER EYELIDS: ICD-10-CM

## 2025-07-16 DIAGNOSIS — J41.1 CHRONIC BRONCHITIS, MUCOPURULENT: ICD-10-CM

## 2025-07-16 DIAGNOSIS — C34.12 ADENOCARCINOMA OF UPPER LOBE OF LEFT LUNG: Primary | ICD-10-CM

## 2025-07-16 DIAGNOSIS — H02.055 TRICHIASIS OF BOTH LOWER EYELIDS: ICD-10-CM

## 2025-07-16 DIAGNOSIS — H02.831 DERMATOCHALASIS OF BOTH UPPER EYELIDS: ICD-10-CM

## 2025-07-16 DIAGNOSIS — C34.12 ADENOCARCINOMA OF UPPER LOBE OF LEFT LUNG: ICD-10-CM

## 2025-07-16 DIAGNOSIS — H02.834 DERMATOCHALASIS OF BOTH UPPER EYELIDS: ICD-10-CM

## 2025-07-16 DIAGNOSIS — J41.0 SIMPLE CHRONIC BRONCHITIS: ICD-10-CM

## 2025-07-16 DIAGNOSIS — J70.0 POST-RADIATION PNEUMONITIS: Chronic | ICD-10-CM

## 2025-07-16 DIAGNOSIS — H04.123 DRY EYES, BILATERAL: Primary | ICD-10-CM

## 2025-07-16 PROCEDURE — 99999 PR PBB SHADOW E&M-EST. PATIENT-LVL II: CPT | Mod: PBBFAC,,, | Performed by: STUDENT IN AN ORGANIZED HEALTH CARE EDUCATION/TRAINING PROGRAM

## 2025-07-16 PROCEDURE — 99213 OFFICE O/P EST LOW 20 MIN: CPT | Mod: PBBFAC,27 | Performed by: INTERNAL MEDICINE

## 2025-07-16 PROCEDURE — 99214 OFFICE O/P EST MOD 30 MIN: CPT | Mod: S$PBB,,, | Performed by: STUDENT IN AN ORGANIZED HEALTH CARE EDUCATION/TRAINING PROGRAM

## 2025-07-16 PROCEDURE — 99212 OFFICE O/P EST SF 10 MIN: CPT | Mod: PBBFAC | Performed by: STUDENT IN AN ORGANIZED HEALTH CARE EDUCATION/TRAINING PROGRAM

## 2025-07-16 PROCEDURE — 99999 PR PBB SHADOW E&M-EST. PATIENT-LVL III: CPT | Mod: PBBFAC,,, | Performed by: INTERNAL MEDICINE

## 2025-07-16 NOTE — PROGRESS NOTES
Subjective:      Patient ID: JACOBO COLEMAN is a 64 y.o. female.    Chief Complaint: 6 month f/u      HPI  63 y.o. 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 Pt is also s/p palliative XRT to T6-T9 vertebrae and adacent paravertebral L rib lesions (22.5 Gy/% Fx, EOT 1/18/24) Pt was previously treated by Dr. Hossein Fields ([facility]) and Dr. Cheney.       2/24 and treated with Doxy and prednisone taper. She was seen by Dr. Cheney 2/28/24- CTA chest ordered to rule out PE as cause of shortness of breath. CTA was negative for PE- but with bilateral GGO, new from 12/2023. She was admitted to the hospital- treated with Levaquin and breathing treatments. Discharged 3/1 with po levaqin.      3/25/2024:  Patient states that, since tapering her prednisone from 20 b.i.d. to 20 daily, her shortness of breath has worsened.  She describes episodes of chest tightness and shortness of breath.  During these episodes, she has a cough productive of liquid sputum, sometimes described as frothy .  These episodes can occur either with exertion or at rest.  She notes intermittent desaturations, saying that she was in the mid 70s this morning.  She denies any chest pain.  She confirms that she is taking nebulizers as scheduled and also p.r.n..  She is also taking Mucinex 800 b.i.d..  She says that her appetite has been good.      April 2024  Feeling better overall while on her prednisone taper.  Just occasional dry cough no sputum production.  Denies fevers, chills and chest pain.  She does have  episodes of bronchospasm which he treats with DuoNeb.  Previous visit she had a 6 minute walk test done that showed desaturation to 88% and she does have portable oxygen that she uses occasionally.  Otherwise no new complaints.  She is scheduled to start Erlotinib in the near future per Heme-Onc notes.     Current inhaler regimen is Breztri twice a day, duo neb every 6 hours as needed and albuterol via rescue inhaler p.r.n. also taking Mucinex 600 mg b.i.d.     July 2024  Here today for follow up of the above  Continues on Tarceva maintenance therapy  Recently saw Heme-Onc  Has not needed prednisone since our last visit  Exercise capacity has improved significantly  Overall feels better  Does have significant GI side-effects from Tarceva and inquired about possibility of converting back to Tagrisso     Jan 2025  Here for follow up of the above  Continues on Tarceva maintenance  Tolerating fairly well thus far  Overall states her pulmonary symptoms are minimal has not had to use her rescue inhaler  Does not complain of any ongoing cough, fever, chills or chest pain, no hemoptysis  Most recent imaging in November was stable to improved    July 2025  Here for scheduled follow-up  Continues on Tarceva  Some ongoing skin issues but  Fairly stable from that standpoint  Over the summer she did have 1 exacerbation that required antibiotics and steroids, currently resolved  Continues to get good relief of respiratory symptoms with a Breztri twice a day  Most recent scans were stable    Review of Systems as per history of present illness otherwise negative  Objective:     Physical Exam   Constitutional: She is oriented to person, place, and time. She appears well-developed. No distress.   HENT:   Head: Normocephalic.   Cardiovascular: Normal rate and regular rhythm.   Pulmonary/Chest: Normal expansion, symmetric chest wall expansion, effort normal and breath sounds normal.   Musculoskeletal:      Cervical back: Neck supple.  "  Neurological: She is alert and oriented to person, place, and time.   Psychiatric: She has a normal mood and affect.   Nursing note and vitals reviewed.            7/16/2025    10:45 AM 6/14/2025    12:52 PM 5/20/2025    11:29 AM 5/20/2025    11:13 AM 5/20/2025    11:05 AM 5/20/2025    11:00 AM 5/20/2025    10:50 AM   Pulmonary Function Tests   SpO2 96 % 95 % 97 % 97 % 100 % 100 % 100 %   Height 5' 10" (1.778 m) 5' 10" (1.778 m)        Weight 79.3 kg (174 lb 13.2 oz) 77.6 kg (171 lb)        BMI (Calculated) 25.1 24.5             Assessment:     1. Adenocarcinoma of upper lobe of left lung    2. Chronic bronchitis, mucopurulent    3. Simple chronic bronchitis    4. Post-radiation pneumonitis        Prominent bandlike/nodular opacity in the anterior left upper lobe appears unchanged series 6, image 219.  Otherwise stable appearance of the lungs with predominantly bilateral lobe scarring/fibrosis and bronchiectasis.  No new suspicious pulmonary nodule.     No significant pleural effusion or pneumothorax.     No new concerning mediastinal or hilar lymphadenopathy.     Heart is normal in size. No significant pericardial effusion.     Coronary artery calcifications are present     Soft tissues of the visualized lower neck, chest wall, and axilla are unremarkable.     No acute bony abnormality. Sclerotic focus in the posterior right 5th rib is unchanged.  Additional sclerotic foci in the T6, T8, and T9 vertebrae also appear unchanged. Bones are otherwise demineralized with multilevel degenerative changes in the spine. No new aggressive osseous lesion     Abdomen/pelvis:     Liver is normal in size, contour, and enhancement. No suspicious focal lesion. Portal venous system is patent.     Gallbladder is surgically absent. Biliary tree is otherwise unremarkable.     Spleen is normal in size and enhancement. No focal lesion.     Pancreas is normal in size, contour, and enhancement. No focal lesion or ductal dilatation.   "   Adrenal glands are unremarkable.     Benign left renal cysts appear unchanged. Small bilateral nonobstructing nephrolithiasis also appear unchanged. Kidneys are otherwise normal in size and enhancement. No new suspicious focal lesion or hydroureteronephrosis.     Scattered colonic diverticula noted without evidence of acute diverticulitis. Gastrointestinal tract is otherwise unremarkable. No evidence of obstruction or mass lesion.     Urinary bladder is unremarkable.     Uterus is surgically absent. No significant adnexal abnormality seen.     Mild scattered atherosclerosis noted. Major vessels of the abdomen and pelvis are otherwise unremarkable.     No suspicious lymphadenopathy.     No significant ascites, pneumoperitoneum, or peritoneal implant.     No acute bony abnormality. Small sclerotic focus in the left femoral neck is unchanged.  No new aggressive lytic or blastic lesion. Mild degenerative changes in the spine.     Impression:     Stable exam.  No new evidence of metastatic disease in the chest, abdomen, or pelvis.    Plan:     Stable disease from an oncologic standpoint  She is having more frequent issues with thick sputum  I did give her a recipe on preparation and storage of 3% saline at home with instructions to nebulized twice a day  Can add Mucinex DM twice a day as well  Continue Breztri as prescribed   No indication for additional steroids or antibiotics at this time  Continue to follow up with Heme-Onc  Return to see me in 6 months, sooner if necessary  I personally reviewed the above with the patient and/or family including test and radiology results. They voiced understanding and agreement with the above. Questions were answered to their apparent satisfaction.

## 2025-07-16 NOTE — PROGRESS NOTES
HPI     Dry Eye     Additional comments: 3 mo dry eye check and trichiasis    Pt saw K specialist, was dilated, was told she need cataract sx and Dr Robles would do it  Systane prn OU              Comments      Hx of Rk  Tensas vision  OD near/ OD distance  Wearing Pro K lens             Last edited by Lukas Joiner on 7/16/2025  9:15 AM.            Assessment /Plan     For exam results, see Encounter Report.    Dry eyes, bilateral  Adenocarcinoma of upper lobe of left lung- ATs QID and lid hygiene w/ baby shampoo  Continue care with oncology  Patient is currently seeing cornea specialist discussed with patient she can continue care with cornea and will eventually undergo cornea transplant with him per pt. report    Dermatochalasis of both upper eyelids- Patient is undergoing immunotherapy and can not have any operations, has contact info for Dr. ULICES Garcia when she is ready     Trichiasis of both lower eyelids- Not bothersome today,   Will follow      RTC PRN

## 2025-08-04 ENCOUNTER — HOSPITAL ENCOUNTER (OUTPATIENT)
Dept: RADIOLOGY | Facility: HOSPITAL | Age: 65
Discharge: HOME | End: 2025-08-04
Attending: HOSPITALIST
Payer: MEDICARE

## 2025-08-04 DIAGNOSIS — C34.12 ADENOCARCINOMA OF UPPER LOBE OF LEFT LUNG: ICD-10-CM

## 2025-08-04 PROCEDURE — 71260 CT THORAX DX C+: CPT | Mod: TC

## 2025-08-04 PROCEDURE — 71260 CT THORAX DX C+: CPT | Mod: 26,,, | Performed by: RADIOLOGY

## 2025-08-04 PROCEDURE — 25500020 PHARM REV CODE 255: Performed by: HOSPITALIST

## 2025-08-04 PROCEDURE — 74177 CT ABD & PELVIS W/CONTRAST: CPT | Mod: 26,,, | Performed by: RADIOLOGY

## 2025-08-04 RX ADMIN — IOHEXOL 100 ML: 350 INJECTION, SOLUTION INTRAVENOUS at 11:08

## 2025-08-05 ENCOUNTER — OFFICE VISIT (OUTPATIENT)
Dept: HEMATOLOGY/ONCOLOGY | Facility: CLINIC | Age: 65
End: 2025-08-05
Payer: MEDICARE

## 2025-08-05 VITALS
WEIGHT: 176.5 LBS | SYSTOLIC BLOOD PRESSURE: 152 MMHG | BODY MASS INDEX: 25.32 KG/M2 | HEART RATE: 83 BPM | OXYGEN SATURATION: 99 % | DIASTOLIC BLOOD PRESSURE: 82 MMHG | TEMPERATURE: 98 F

## 2025-08-05 DIAGNOSIS — L03.012 PARONYCHIA OF FINGER OF LEFT HAND: ICD-10-CM

## 2025-08-05 DIAGNOSIS — R19.7 DIARRHEA, UNSPECIFIED TYPE: ICD-10-CM

## 2025-08-05 DIAGNOSIS — N20.0 NEPHROLITHIASIS: ICD-10-CM

## 2025-08-05 DIAGNOSIS — L70.8 ACNEIFORM RASH: ICD-10-CM

## 2025-08-05 DIAGNOSIS — Z71.89 ADVANCED CARE PLANNING/COUNSELING DISCUSSION: ICD-10-CM

## 2025-08-05 DIAGNOSIS — C79.51 SECONDARY MALIGNANT NEOPLASM OF BONE: ICD-10-CM

## 2025-08-05 DIAGNOSIS — H25.89 OTHER AGE-RELATED CATARACT OF BOTH EYES: ICD-10-CM

## 2025-08-05 DIAGNOSIS — C34.12 ADENOCARCINOMA OF UPPER LOBE OF LEFT LUNG: Primary | ICD-10-CM

## 2025-08-05 DIAGNOSIS — J91.0 MALIGNANT PLEURAL EFFUSION: ICD-10-CM

## 2025-08-05 PROCEDURE — G2211 COMPLEX E/M VISIT ADD ON: HCPCS | Mod: ,,, | Performed by: HOSPITALIST

## 2025-08-05 PROCEDURE — 99999 PR PBB SHADOW E&M-EST. PATIENT-LVL IV: CPT | Mod: PBBFAC,,, | Performed by: HOSPITALIST

## 2025-08-05 PROCEDURE — 99214 OFFICE O/P EST MOD 30 MIN: CPT | Mod: PBBFAC | Performed by: HOSPITALIST

## 2025-08-05 PROCEDURE — 99215 OFFICE O/P EST HI 40 MIN: CPT | Mod: S$PBB,,, | Performed by: HOSPITALIST

## 2025-08-05 NOTE — Clinical Note
Hi team - Wondering how I can update this patient's LAPOST to be full code? She says she previously had a DNR LAPOST, but when I tried to update, it said there were none in the system and I was generating a new one for this patient.

## 2025-08-05 NOTE — PROGRESS NOTES
[facility] MEDICAL ONCOLOGY - FOLLOW UP VISIT    Reason for visit: Stage IV adenocarcinoma of the lung      Oncology History   Adenocarcinoma of upper lobe of left lung   1/5/2023 Procedure    Bronchoscopy  JAZMYNE Biopsy  - Adenocarcinoma, (TTF-1 positive, p40 negative)    [place] NGS  - EGFR L858R mutation positive, VAF 34.8%  - PD-L1 5%     2/1/2023 Procedure    Attempted JAZMYNE Resection, Aborted for Pleural / Diaphragmatic Involvement  1. Diaphragm nodules, biopsy:                                               - Moderately differentiated adenocarcinoma.                           2. Parietal pleura, biopsies:                                               - Moderately differentiated adenocarcinoma.                           3. Pericardial nodule, biopsy:                                              - Moderately differentiated adenocarcinoma, see Comment.                 Comment; Immunohistochemistry was performed on tissue block 3A.         The adenocarcinoma is positive for TTF-1, Austin-EP4, cytokeratin 7        and Napsin-A. Calretinin, cytokeratin 20 and cytokeratin 5/6 are        negative. This immunophenotype supports pulmonary origin.              4. Diaphragm nodule, biopsy:                                                - Atypical TTF-1 positive cells, cannot exclude malignancy.           5. Left pleural fluid for cytology (ThinPrep, two cytospins, and           cell block):                                                             - Positive for malignant cells, consistent with metastatic             adenocarcinoma, pulmonary origin.                                         Comment; Immunohistochemistry was performed on cell block 5A.           The malignant cells are positive for Austin-EP4 and TTF-1.           [facility]  - EGFR L858R (VAF 46%), CTNNB1, CKS1B     9/8/2023 Cancer Staged    Staging form: Lung, AJCC 8th Edition  - Clinical stage from 9/8/2023:  Stage IV (ycT2, cN1, cM1)     2/28/2024 Imaging Significant Findings    CT C, Non-Con  - Interval development of extensive multifocal groundglass infiltrates in b/l upper lobes, lower lobes and right middle lobe   - 2.6 x 1.4 cm spiculated nodule in the left upper lobe with surrounding scarring, previously 3.0 x 1.7 cm   - 2 nodular infiltrates in the right lung base  - Stable sclerotic bone lesions in the thoracic spine consistent with osteoblastic metastasis      2/28/2024 - 3/1/2024 Hospital Admission    Admitted for SOB. Had not improved on doxycycline and prednisone. Started of levofloxacin.      3/22/2024 Imaging Significant Findings    CT C  - Unchanged 1.7 cm JAZMYNE lesion  - Interval reduction in ground-glass opacities, w/ residual architectural distortion and pleuroparenchymal scarring most compatible w/ sequela of prior viral pneumonitis  - Mild consolidation in the superior segment LLL  -      3/22/2024 Tumor Conference    [facility] Tumor Board  Difficult to determine extent of radiation vs drug-induced pneumonitis. There appear to be ground glass opacities outside of the treatment field, which would argue somewhat against a pure radiation pneumonitis.     Recommendations for Radiation Oncologists to do peer to peer with MD Rojas and for pt to follow up with Dr. Adams to discuss treatment options.        4/25/2024 -  Targeted Therapy    Erlotinib  - 150 mg daily     5/13/2024 Imaging Significant Findings    CT C  - Stable appearance of JAZMYNE spiculated mass w/ associated scarring anteriorly  - Additional increasing areas of fibrosis and pulmonary scarring including in the infrahilar region b/l extending posteriorly into lower lobes b/l, suggest changes secondary to localized radiation therapy  - No pathologic LN enlargement     7/15/2024 Imaging Significant Findings    MRI T Spine W/ and W/o  - osseous metastatic disease involving posterior aspect of T6 through T9, no pathologic compression fractures or  evidence of epidural involvement  - Multilevel mild neural foraminal stenosis throughout thoracic spine  - No significant spinal canal stenosis     8/5/2024 Imaging Significant Findings    CT C/A/P  - Stable spiculated JAZMYNE opacity  - Medial bilateral lower lobe fibrosis likely radiation induced more prominent  - No new pulmonary nodule or mass  - No mediastinal or hilar adenopathy  - Stable sclerotic bone lesions at T6, T8, T9     11/8/2024 Imaging Significant Findings    CT C/A/P  2.3 x 1.3 x 1.4 cm spiculated JAZMYNE nodule, stable  Bilateral medial infrahilar fibrosis with mild improvement of associated ground-glass opacities consistent with postradiation treatment  No new findings to suggest progression     2/10/2025 Imaging Significant Findings    CT C/A/P  Stable JAZMYNE bandlike/nodular opacity in JAZMYNE  No new pulmonary nodule / consolidation  No mediastinal or hilar LAD  Stable sclerotic foci at 5th R rib, T6, T8, T9     5/5/2025 Imaging Significant Findings    CT C/A/P  Stable JAZMYNE bandlike/nodular opacity  No new suspicious pulmonary nodules  No significant pleural effusion  No new/concerning mediastinal or hilar lymphadenopathy  Stable sclerotic foci and T6, T8, and T9     8/4/2025 Imaging Significant Findings    CT C/A/P  Stable bandlike thickening in JAZMYNE  Stable thickening in posterior paraspinal regions bilaterally, probably corresponding to fibrosis/posttreatment changes  No new suspicious lung nodules  No pericardial effusion  No acute skeletal findings  No evidence of recurrence or metastatic disease     EGFR-related lung cancer   10/5/2023 Initial Diagnosis    EGFR-related lung cancer     10/6/2023 - 10/6/2023 Chemotherapy    Treatment Summary   Plan Name: OP OSIMERTINIB DAILY  Treatment Goal: Control  Status: Inactive  Start Date: 10/6/2023  End Date: 10/6/2023  Provider: Zenon Cheney MD  Chemotherapy: osimertinib (TAGRISSO) 80 mg Tab, 80 mg, Oral, Daily, 1 of 1 cycle, Start date: 10/6/2023, End date:  "3/25/2024     3/22/2024 Tumor Conference    [facility] Tumor Board  Difficult to determine extent of radiation vs drug-induced pneumonitis. There appear to be ground glass opacities outside of the treatment field, which would argue somewhat against a pure radiation pneumonitis.     Recommendations for Radiation Oncologists to do peer to peer with MD Rojas and for pt to follow up with Dr. Adams to discuss treatment options.        8/5/2024 Imaging Significant Findings    CT C/A/P  - Stable spiculated JAZMYNE opacity  - Medial bilateral lower lobe fibrosis likely radiation induced more prominent  - No new pulmonary nodule or mass  - No mediastinal or hilar adenopathy  - Stable sclerotic bone lesions at T6, T8, T9        Per Dr. Fields, 8/28/23:   "Mrs. Hancock is a 62-year-old lady with a history that dates to late 2022 when she had upper respiratory infection. This prompted medical attention that resulted in a chest x-ray revealing a left upper lobe mass. This was followed by CT of the chest that confirmed a left upper lobe mass with additional satellite nodules of the lobe as well as nodules of the left pleura. She also had a nodule noted at the right base. She was sent for PET CT scan and ultimately had bronchoscopic biopsy. The left upper lobe was confirmed to be consistent with adenocarcinoma of lung primary.    She was sent to me for recommendations in preparation for an attempt at definitive surgery. Unfortunately, during her attempt for surgical resection, she was found to have metastatic disease to the pleural space with malignant involvement of pleural effusion.     She went on to complete 4 cycles of palliative chemotherapy with carboplatin, pemetrexed, and pembrolizumab with mixed CT findings resulting in recommendations for continuation of chemotherapy with pemetrexed and pembrolizumab. She has remained on pemetrexed and pembrolizumab for ongoing palliative therapy.     Patient presents to clinic in f/u for lung " cancer prior to continuation of systemic therapy. She continues maintenance pemetrexed plus pembrolizumab. Since her previous appointment she did have dermatology evaluation of a rash who performed punch biopsy. She was initiated on steroid topical by dermatology with improvement. In the interim she also reported recent increase in frontal headaches that resulted in ordering of MRI of the brain. Other than above noted rash and headache she has continued to tolerate palliative Pembro plus pemetrexed well. She currently denies any pain. She denies any shortness of breath. She denies any nausea or vomiting. She denies any diarrhea or constipation.    1. Metastatic left lung adenocarcinoma: She has completed 4 cycles of palliative chemotherapy with carboplatin, pemetrexed, and pembrolizumab with mixed CT findings resulting in recommendations for continuation of chemotherapy with pemetrexed and pembrolizumab. She has continued pemetrexed plus pembrolizumab for maintenance palliative therapy with positive radiographic response. CT scan results discussed in detail with patient. Multiple questions were asked by patient during discussion of CT scan results, all of which were answered in detail to her apparent satisfaction. With continued positive radiographic response and without dermatologic evaluation suggest immune mediated skin reaction, recommendations made for patient to continue pemetrexed plus pembrolizumab for ongoing palliative maintenance therapy. Patient verbalized understanding and agrees with recommendations as made.    Once again discussed the need to refrain from the use of systemic steroid therapy with immunotherapy given the theoretical risk that systemic steroid could augment the T-cell response.     2. Rash - s/p punch biopsy of left distal pretibial region on 8/3/2023 pathologic consistent with spongiotic dermatitis with eosinophils; negative for medication reaction per dermatology. She will continue  "topical management per dermatology recommendations. "     Oncology History        HPI:     JACOBO COLEMAN is a 63 y.o. female with pmh significant for MDD, GERD, and hypothyroidism who presents for follow up of the below lung cancer. Pt was previously treated by Dr. Hossein Fields ([facility]) and Dr. Cheney.    1) Stage IV adenocarcinoma of the JAZMYNE w/ pleural involvement, malignant pleural effusion, and mets to the thoracic spine (EGFR L858R mutated, PD-L1 5%), initially dx'd 1/5/23, s/o carboplatin/pemetrexed/pembrolizumab x4 cycles followed by pemetrexed/pembroliuzmab maintenance (2/2023-8/2023, complicated by AEs), osimertinib (10/6/23 - 2/28/24; stopped for possible drug-induced pneumonitis), palliative XRT to T6-T9 vertebrae and adacent paravertebral L rib lesions (22.5 Gy/% Fx, EOT 1/18/24), and erlotinib (4/25/24 - present)     Last clinic 5/6/25, most recent imaging stable compared to prior.  Will dose reduce lorlatinib given how invasive symptoms are in everyday life.  Continue with imaging every 3 months until progression or intolerance.    Interval History:  5/7/25: Discussed with pharmacy, okay to trial alternating 150 mg and 100 mg. Will send prescriptions for every other day for each strength   5/20/25:  Paravertebral block at T7-T8  6/14/25:  Urgent care for bronchitis  7/11/25: Dermatology, near complete resolution of paronychia  7/16/25: Pulmonology, more frequent issues with the extubate him, preparation for 3% nebulized treatment, can add Mucinex DM twice a day, continue Shea, RTC in 6 months  8/4/25:  CT C/A/P stable as above    She confirms that she has been alternating 150 mg and 100 mg erlotinib, without any missed doses. She feels that her side effects have improved since doing this.     The pt says that she is doing much better than she was 1-2 months ago, when she was having a lot of respiratory issues. She says that she has felt better for the past month.     She says that " "her paronychia has almost completely resolved, and she continues to follow with dermatology.     She notes that she has an upcoming cataract and corneal transplant scheduled in November.     She says that her diarrhea "comes and goes". She says that she uses lomotil "very rarely".     She says that she has been having intermittent "nasal hallucinations". She says that she had this previously when she had sinus infections. She says that she had an episode last night which lasted for a couple of hours.    She has intermittent sores over her lips.     She describes a mild acneiform rash, particularly when she sweats.     She continues to have leg and toe cramps.     She states that she is planning to travel to [place] with her granddaughter.     ROS:   As per HPI.       Physical Exam:       BP (!) 152/82   Pulse 83   Temp 97.6 °F (36.4 °C) (Temporal)   Wt 80 kg (176 lb 7.7 oz)   SpO2 99%   BMI 25.32 kg/m²                Physical Exam  Constitutional:       Appearance: Normal appearance.   HENT:      Head: Normocephalic and atraumatic.   Eyes:      Extraocular Movements: Extraocular movements intact.      Conjunctiva/sclera: Conjunctivae normal.      Pupils: Pupils are equal, round, and reactive to light.   Cardiovascular:      Rate and Rhythm: Normal rate and regular rhythm.      Heart sounds: No murmur heard.     No friction rub. No gallop.   Pulmonary:      Effort: Pulmonary effort is normal.      Breath sounds: No wheezing, rhonchi or rales.   Musculoskeletal:         General: Normal range of motion.   Skin:     General: Skin is warm and dry.      Comments: Erythema over left middle nailbed   Neurological:      Mental Status: She is alert and oriented to person, place, and time.   Psychiatric:         Mood and Affect: Mood normal.         Thought Content: Thought content normal.         Judgment: Judgment normal.           Labs:   Recent Results (from the past 48 hours)   CBC Oncology    Collection Time: " 08/04/25 10:08 AM   Result Value Ref Range    WBC 6.62 3.90 - 12.70 K/uL    RBC 4.30 4.00 - 5.40 M/uL    HGB 12.8 12.0 - 16.0 gm/dL    HCT 39.6 37.0 - 48.5 %    MCV 92 82 - 98 fL    MCH 29.8 27.0 - 31.0 pg    MCHC 32.3 32.0 - 36.0 g/dL    RDW 13.0 11.5 - 14.5 %    Platelet Count 310 150 - 450 K/uL    MPV 9.0 (L) 9.2 - 12.9 fL    Neut # 3.73 1.8 - 7.7 K/uL    Imm Grans # 0.03 0.00 - 0.04 K/uL   Comprehensive Metabolic Panel    Collection Time: 08/04/25 10:08 AM   Result Value Ref Range    Sodium 144 136 - 145 mmol/L    Potassium 4.1 3.5 - 5.1 mmol/L    Chloride 113 (H) 95 - 110 mmol/L    CO2 22 (L) 23 - 29 mmol/L    Glucose 98 70 - 110 mg/dL    BUN 26 (H) 8 - 23 mg/dL    Creatinine 1.0 0.5 - 1.4 mg/dL    Calcium 9.1 8.7 - 10.5 mg/dL    Protein Total 6.7 6.0 - 8.4 gm/dL    Albumin 3.9 3.5 - 5.2 g/dL    Bilirubin Total 0.5 0.1 - 1.0 mg/dL     40 - 150 unit/L    AST 34 11 - 45 unit/L    ALT 32 10 - 44 unit/L    Anion Gap 9 8 - 16 mmol/L    eGFR >60 >60 mL/min/1.73/m2          Imaging:    See oncologic history above.     Path:  See oncologic history above.      Assessment and Plan:     JACOBO COLEMAN is a 63 y.o. female with pmh significant for MDD, GERD, and hypothyroidism who presents for follow up of the below lung cancer. Pt was previously treated by Dr. Hosesin Fields ([facility]) and Dr. Cheney.    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  Patient is currently on erlotinib, treatment complicated by acneiform rash, diarrhea, paronychia, and alopecia; symptoms have all improved since dose reduction (see note from 5/6/25 for rationale). Most recent imaging (8/4/25) is stable compared to prior, with stable disease in the chest and skeleton and no new areas of disease elsewhere. Given radiographic evidence of stability and improved tolerance, will continue with dose reduced erlotinib w/ q3m imaging, labs, and follow up until progression or intolerance. At that time, consider possible reversion to osimertinib (pending molecular workup) vs transition to amivantamab/carboplatin/pemetrexed per JINNY-2.     Dose Reduction  4/25/24 - 5/6/25  150 mg daily    5/6/25 - present (reduced for paronychia, diarrhea, acneiform rash, alopecia)  Alternating 150 mg and 100 mg every other day    PLAN:   Continue erlotinib 150/100 mg alternating daily, labs acceptable  CT C/A/P  in 3 months  Labs w/ imaging, RTC at least 1 day after imaging  Pt prefers to follow at [facility] in the mornings      Impending Cataract Surgery / Corneal Transplant  Pt anticipates a cataract surgery and corneal transplant on 10/16/25 w/ Dr. Navdeep Robles. Notably, erlotinib is associated with ocular toxicities which may increase the risk of healing / graft failure.   Will discuss with pharmacy regarding erlotinib around this topic and then reach out to Dr. Robles directly.       Advanced Care Planning  Pt states that she was previously listed as DRN/DNI, however says that this decision was made when she was unwell towards the beginning of her diagnosis and treatment. In the setting of stable and largely asymptomatic cancer, she would like to revert to full code recognizing that any near-future medical emergencies may be unrelated to her cancer and reversable. This is very reasonable.   Message sent regarding [employer].       Nephrolithiasis  Patient describes colicky  abdominal pain.  CT imaging with evidence of nephrolithiasis.  Referral to urology in place      Diarrhea  Related to erlotinib. Patient now using Imodium and lomotil infrequently.  Continue anti-diarrheals PRN      Paronychia  S/p excision of lateral nailbed (L third finger) and multiple rounds of abx w/ subsequent improvement.Paronychia now largely resolved.   Continued management per dermatology  Dose reduction of erlotinib as above      Bony Mets  Consider zometa at next visit, noting stable and treated lesions      G1 EGFR Inhibitor-Induced Acneiform Rash  Patient initially developed an acneiform rash over her face, chest, and scalp.  It was mildly pruritic and tender. It covered ~10% of her body surface area.  Notably, she had not been using doxycycline or hydrocortisone prophylaxis as previously discussed.  Also notably, she was on steroids until 1 week prior which may have alleviated the initial symptoms.  Patient has since received clindamycin gel  and hydrocortisone 2.5%, used to moderate effect.  She continues to have some mild erythema over her cheeks b/l but no further pustular lesions.  Patient states she does not want take doxycycline as this often causes her to have an upset stomach.  Continue hydrocortisone 2.5% for areas of eruption  Continue clindamycin 1% gel as needed (pt notes she has clindamycin wipes)  Previously discussed using sun protection (SPF, wide brimmed hat, sun shirts)      Pneumonitis/Radiation Fibrosis  Dyspnea  See previous notes for description.  Most recent imaging demonstrates evolving likely radiation fibrosis.  Symptomatically, patient has improved in his no longer taking steroids.  She says that she has her good days and bad days, but previously noted that her sats are consistently above 92%.  She still has some difficulty when bending over and in the heat.   CTM      The above information has been reviewed with the patient and all questions have been answered to their  apparent satisfaction.  They understand that they can call the clinic with any questions.    Marin Adams MD  Hematology/Oncology  [facility]      Route Chart for Scheduling    Med Onc Chart Routing      Follow up with physician .  CT C/A/P  in 3 months  Labs w/ imaging, RTC at least 1 day after imaging  Pt prefers to follow at [facility] in the mornings   Follow up with [facility]    Infusion scheduling note    Injection scheduling note    Labs    Imaging    Pharmacy appointment    Other referrals                  Disclaimer: This note was prepared using voice recognition system and is likely to have sound alike errors and is not proof read.  Please call me with any questions.

## 2025-08-05 NOTE — Clinical Note
Unique question: Pt anticipating a cataract surgery and possible corneal transplant. Erlotinib may cause healing issues of the epithelium and ocular toxicities specifically. Wondering your thoughts? I will reach out to her ophthalmologist as well.

## 2025-08-06 ENCOUNTER — PATIENT MESSAGE (OUTPATIENT)
Dept: HEMATOLOGY/ONCOLOGY | Facility: CLINIC | Age: 65
End: 2025-08-06
Payer: MEDICARE

## 2025-08-12 DIAGNOSIS — C34.12 ADENOCARCINOMA OF UPPER LOBE OF LEFT LUNG: Primary | ICD-10-CM

## 2025-08-19 ENCOUNTER — HOSPITAL ENCOUNTER (OUTPATIENT)
Dept: RADIOLOGY | Facility: HOSPITAL | Age: 65
Discharge: HOME | End: 2025-08-19
Attending: HOSPITALIST
Payer: MEDICARE

## 2025-08-19 ENCOUNTER — RESULTS FOLLOW-UP (OUTPATIENT)
Dept: HEMATOLOGY/ONCOLOGY | Facility: CLINIC | Age: 65
End: 2025-08-19
Payer: MEDICARE

## 2025-08-19 DIAGNOSIS — C34.12 ADENOCARCINOMA OF UPPER LOBE OF LEFT LUNG: ICD-10-CM

## 2025-08-19 PROCEDURE — 25500020 PHARM REV CODE 255: Mod: PO | Performed by: HOSPITALIST

## 2025-08-19 PROCEDURE — A9585 GADOBUTROL INJECTION: HCPCS | Mod: PO | Performed by: HOSPITALIST

## 2025-08-19 PROCEDURE — 70553 MRI BRAIN STEM W/O & W/DYE: CPT | Mod: 26,,, | Performed by: RADIOLOGY

## 2025-08-19 PROCEDURE — 70553 MRI BRAIN STEM W/O & W/DYE: CPT | Mod: TC,PO

## 2025-08-19 RX ORDER — GADOBUTROL 604.72 MG/ML
8 INJECTION INTRAVENOUS
Status: COMPLETED | OUTPATIENT
Start: 2025-08-19 | End: 2025-08-19

## 2025-08-19 RX ADMIN — GADOBUTROL 8 ML: 604.72 INJECTION INTRAVENOUS at 09:08

## 2025-08-22 ENCOUNTER — HOSPITAL ENCOUNTER (EMERGENCY)
Facility: HOSPITAL | Age: 65
Discharge: HOME | End: 2025-08-22
Attending: EMERGENCY MEDICINE
Payer: MEDICARE

## 2025-08-22 VITALS
WEIGHT: 176.19 LBS | SYSTOLIC BLOOD PRESSURE: 166 MMHG | BODY MASS INDEX: 25.28 KG/M2 | HEART RATE: 76 BPM | RESPIRATION RATE: 19 BRPM | TEMPERATURE: 98 F | DIASTOLIC BLOOD PRESSURE: 76 MMHG | OXYGEN SATURATION: 96 %

## 2025-08-22 DIAGNOSIS — N20.1 CALCULUS OF DISTAL LEFT URETER: Primary | ICD-10-CM

## 2025-08-22 LAB
ABSOLUTE EOSINOPHIL (OHS): 0.2 K/UL
ABSOLUTE MONOCYTE (OHS): 0.71 K/UL (ref 0.3–1)
ABSOLUTE NEUTROPHIL COUNT (OHS): 12.49 K/UL (ref 1.8–7.7)
ALBUMIN SERPL BCP-MCNC: 4.2 G/DL (ref 3.5–5.2)
ALP SERPL-CCNC: 130 UNIT/L (ref 40–150)
ALT SERPL W/O P-5'-P-CCNC: 38 UNIT/L (ref 10–44)
ANION GAP (OHS): 9 MMOL/L (ref 8–16)
AST SERPL-CCNC: 38 UNIT/L (ref 11–45)
BACTERIA #/AREA URNS AUTO: ABNORMAL /HPF
BASOPHILS # BLD AUTO: 0.06 K/UL
BASOPHILS NFR BLD AUTO: 0.4 %
BILIRUB SERPL-MCNC: 0.3 MG/DL (ref 0.1–1)
BILIRUB UR QL STRIP.AUTO: NEGATIVE
BUN SERPL-MCNC: 26 MG/DL (ref 8–23)
CALCIUM SERPL-MCNC: 9.5 MG/DL (ref 8.7–10.5)
CHLORIDE SERPL-SCNC: 110 MMOL/L (ref 95–110)
CLARITY UR: CLEAR
CO2 SERPL-SCNC: 21 MMOL/L (ref 23–29)
COLOR UR AUTO: YELLOW
CREAT SERPL-MCNC: 1 MG/DL (ref 0.5–1.4)
ERYTHROCYTE [DISTWIDTH] IN BLOOD BY AUTOMATED COUNT: 12.9 % (ref 11.5–14.5)
GFR SERPLBLD CREATININE-BSD FMLA CKD-EPI: >60 ML/MIN/1.73/M2
GLUCOSE SERPL-MCNC: 172 MG/DL (ref 70–110)
GLUCOSE UR QL STRIP: NEGATIVE
HCT VFR BLD AUTO: 39.6 % (ref 37–48.5)
HGB BLD-MCNC: 13.1 GM/DL (ref 12–16)
HGB UR QL STRIP: NEGATIVE
HYALINE CASTS UR QL AUTO: 1 /LPF (ref 0–1)
IMM GRANULOCYTES # BLD AUTO: 0.16 K/UL (ref 0–0.04)
IMM GRANULOCYTES NFR BLD AUTO: 1 % (ref 0–0.5)
KETONES UR QL STRIP: NEGATIVE
LEUKOCYTE ESTERASE UR QL STRIP: ABNORMAL
LIPASE SERPL-CCNC: 49 U/L (ref 4–60)
LYMPHOCYTES # BLD AUTO: 1.64 K/UL (ref 1–4.8)
MCH RBC QN AUTO: 29.7 PG (ref 27–31)
MCHC RBC AUTO-ENTMCNC: 33.1 G/DL (ref 32–36)
MCV RBC AUTO: 90 FL (ref 82–98)
MICROSCOPIC COMMENT: ABNORMAL
NITRITE UR QL STRIP: NEGATIVE
NUCLEATED RBC (/100WBC) (OHS): 0 /100 WBC
PH UR STRIP: 6 [PH]
PLATELET # BLD AUTO: 364 K/UL (ref 150–450)
PMV BLD AUTO: 9.5 FL (ref 9.2–12.9)
POTASSIUM SERPL-SCNC: 5.2 MMOL/L (ref 3.5–5.1)
PROT SERPL-MCNC: 7.2 GM/DL (ref 6–8.4)
PROT UR QL STRIP: NEGATIVE
RBC # BLD AUTO: 4.41 M/UL (ref 4–5.4)
RBC #/AREA URNS AUTO: 6 /HPF (ref 0–4)
RELATIVE EOSINOPHIL (OHS): 1.3 %
RELATIVE LYMPHOCYTE (OHS): 10.7 % (ref 18–48)
RELATIVE MONOCYTE (OHS): 4.7 % (ref 4–15)
RELATIVE NEUTROPHIL (OHS): 81.9 % (ref 38–73)
SODIUM SERPL-SCNC: 140 MMOL/L (ref 136–145)
SP GR UR STRIP: 1.02
SQUAMOUS #/AREA URNS AUTO: <1 /HPF
UROBILINOGEN UR STRIP-ACNC: NEGATIVE EU/DL
WBC # BLD AUTO: 15.26 K/UL (ref 3.9–12.7)
WBC #/AREA URNS AUTO: 15 /HPF (ref 0–5)

## 2025-08-22 PROCEDURE — 96374 THER/PROPH/DIAG INJ IV PUSH: CPT

## 2025-08-22 PROCEDURE — 82247 BILIRUBIN TOTAL: CPT

## 2025-08-22 PROCEDURE — 83690 ASSAY OF LIPASE: CPT | Performed by: EMERGENCY MEDICINE

## 2025-08-22 PROCEDURE — 87088 URINE BACTERIA CULTURE: CPT

## 2025-08-22 PROCEDURE — 96376 TX/PRO/DX INJ SAME DRUG ADON: CPT

## 2025-08-22 PROCEDURE — 85025 COMPLETE CBC W/AUTO DIFF WBC: CPT

## 2025-08-22 PROCEDURE — 96361 HYDRATE IV INFUSION ADD-ON: CPT

## 2025-08-22 PROCEDURE — 81001 URINALYSIS AUTO W/SCOPE: CPT

## 2025-08-22 PROCEDURE — 63600175 PHARM REV CODE 636 W HCPCS: Mod: JZ,TB | Performed by: EMERGENCY MEDICINE

## 2025-08-22 PROCEDURE — 25000003 PHARM REV CODE 250

## 2025-08-22 PROCEDURE — 96375 TX/PRO/DX INJ NEW DRUG ADDON: CPT

## 2025-08-22 PROCEDURE — 99285 EMERGENCY DEPT VISIT HI MDM: CPT | Mod: 25

## 2025-08-22 PROCEDURE — 63600175 PHARM REV CODE 636 W HCPCS

## 2025-08-22 RX ORDER — ONDANSETRON HYDROCHLORIDE 2 MG/ML
4 INJECTION, SOLUTION INTRAVENOUS
Status: COMPLETED | OUTPATIENT
Start: 2025-08-22 | End: 2025-08-22

## 2025-08-22 RX ORDER — ONDANSETRON HYDROCHLORIDE 4 MG/5ML
4 SOLUTION ORAL ONCE
Status: COMPLETED | OUTPATIENT
Start: 2025-08-22 | End: 2025-08-22

## 2025-08-22 RX ORDER — KETOROLAC TROMETHAMINE 30 MG/ML
15 INJECTION, SOLUTION INTRAMUSCULAR; INTRAVENOUS
Status: COMPLETED | OUTPATIENT
Start: 2025-08-22 | End: 2025-08-22

## 2025-08-22 RX ORDER — MORPHINE SULFATE 4 MG/ML
4 INJECTION, SOLUTION INTRAMUSCULAR; INTRAVENOUS
Refills: 0 | Status: COMPLETED | OUTPATIENT
Start: 2025-08-22 | End: 2025-08-22

## 2025-08-22 RX ORDER — CIPROFLOXACIN 2 MG/ML
400 INJECTION, SOLUTION INTRAVENOUS
Status: DISCONTINUED | OUTPATIENT
Start: 2025-08-22 | End: 2025-08-22

## 2025-08-22 RX ORDER — CIPROFLOXACIN 500 MG/1
500 TABLET, FILM COATED ORAL 2 TIMES DAILY
Qty: 14 TABLET | Refills: 0 | Status: SHIPPED | OUTPATIENT
Start: 2025-08-22 | End: 2025-08-29

## 2025-08-22 RX ORDER — TAMSULOSIN HYDROCHLORIDE 0.4 MG/1
0.4 CAPSULE ORAL DAILY
Qty: 30 CAPSULE | Refills: 0 | Status: SHIPPED | OUTPATIENT
Start: 2025-08-22 | End: 2025-09-21

## 2025-08-22 RX ADMIN — MORPHINE SULFATE 4 MG: 4 INJECTION INTRAVENOUS at 12:08

## 2025-08-22 RX ADMIN — KETOROLAC TROMETHAMINE 15 MG: 30 INJECTION, SOLUTION INTRAMUSCULAR at 11:08

## 2025-08-22 RX ADMIN — SODIUM CHLORIDE 1000 ML: 9 INJECTION, SOLUTION INTRAVENOUS at 11:08

## 2025-08-22 RX ADMIN — ONDANSETRON 4 MG: 4 SOLUTION ORAL at 11:08

## 2025-08-22 RX ADMIN — MORPHINE SULFATE 4 MG: 4 INJECTION INTRAVENOUS at 11:08

## 2025-08-22 RX ADMIN — ONDANSETRON 4 MG: 2 INJECTION INTRAMUSCULAR; INTRAVENOUS at 12:08

## 2025-08-23 LAB
HOLD SPECIMEN: NORMAL
HOLD SPECIMEN: NORMAL

## 2025-08-25 LAB — BACTERIA UR CULT: ABNORMAL

## 2025-08-26 ENCOUNTER — OFFICE VISIT (OUTPATIENT)
Dept: UROLOGY | Facility: CLINIC | Age: 65
End: 2025-08-26
Payer: MEDICARE

## 2025-08-26 VITALS — SYSTOLIC BLOOD PRESSURE: 120 MMHG | DIASTOLIC BLOOD PRESSURE: 74 MMHG | HEART RATE: 93 BPM

## 2025-08-26 DIAGNOSIS — N39.0 RECURRENT UTI: ICD-10-CM

## 2025-08-26 DIAGNOSIS — N20.0 KIDNEY STONE: Primary | ICD-10-CM

## 2025-08-26 LAB
BILIRUBIN, UA POC OHS: NEGATIVE
BLOOD, UA POC OHS: NEGATIVE
CLARITY, UA POC OHS: CLEAR
COLOR, UA POC OHS: YELLOW
GLUCOSE, UA POC OHS: NEGATIVE
KETONES, UA POC OHS: NEGATIVE
LEUKOCYTES, UA POC OHS: ABNORMAL
NITRITE, UA POC OHS: NEGATIVE
PH, UA POC OHS: 5
PROTEIN, UA POC OHS: NEGATIVE
SPECIFIC GRAVITY, UA POC OHS: >=1.03
UROBILINOGEN, UA POC OHS: 0.2

## 2025-08-26 PROCEDURE — 99215 OFFICE O/P EST HI 40 MIN: CPT | Mod: PBBFAC | Performed by: UROLOGY

## 2025-08-26 PROCEDURE — 99204 OFFICE O/P NEW MOD 45 MIN: CPT | Mod: S$PBB,,, | Performed by: UROLOGY

## 2025-08-26 PROCEDURE — 99999 PR PBB SHADOW E&M-EST. PATIENT-LVL V: CPT | Mod: PBBFAC,,, | Performed by: UROLOGY

## 2025-08-26 PROCEDURE — 99999PBSHW POCT URINALYSIS(INSTRUMENT): Mod: PBBFAC,,,

## 2025-08-26 PROCEDURE — 81003 URINALYSIS AUTO W/O SCOPE: CPT | Mod: PBBFAC | Performed by: UROLOGY
